# Patient Record
Sex: MALE | Race: WHITE | Employment: OTHER | URBAN - METROPOLITAN AREA
[De-identification: names, ages, dates, MRNs, and addresses within clinical notes are randomized per-mention and may not be internally consistent; named-entity substitution may affect disease eponyms.]

---

## 2018-07-09 ENCOUNTER — APPOINTMENT (OUTPATIENT)
Dept: GENERAL RADIOLOGY | Age: 83
End: 2018-07-09
Attending: EMERGENCY MEDICINE
Payer: MEDICARE

## 2018-07-09 ENCOUNTER — HOSPITAL ENCOUNTER (EMERGENCY)
Age: 83
Discharge: HOME | End: 2018-07-09
Attending: EMERGENCY MEDICINE
Payer: MEDICARE

## 2018-07-09 VITALS
OXYGEN SATURATION: 94 % | BODY MASS INDEX: 29.02 KG/M2 | WEIGHT: 170 LBS | HEIGHT: 64 IN | TEMPERATURE: 100.2 F | HEART RATE: 97 BPM | SYSTOLIC BLOOD PRESSURE: 137 MMHG | RESPIRATION RATE: 20 BRPM | DIASTOLIC BLOOD PRESSURE: 66 MMHG

## 2018-07-09 DIAGNOSIS — R50.9 ACUTE FEBRILE ILLNESS: Primary | ICD-10-CM

## 2018-07-09 DIAGNOSIS — R31.9 HEMATURIA, UNSPECIFIED TYPE: ICD-10-CM

## 2018-07-09 LAB
ALBUMIN SERPL-MCNC: 3.4 G/DL (ref 3.2–4.6)
ALBUMIN/GLOB SERPL: 0.9 {RATIO} (ref 1.2–3.5)
ALP SERPL-CCNC: 90 U/L (ref 50–136)
ALT SERPL-CCNC: 20 U/L (ref 12–65)
ANION GAP SERPL CALC-SCNC: 7 MMOL/L (ref 7–16)
AST SERPL-CCNC: 21 U/L (ref 15–37)
BACTERIA URNS QL MICRO: 0 /HPF
BASOPHILS # BLD: 0 K/UL (ref 0–0.2)
BASOPHILS NFR BLD: 0 % (ref 0–2)
BILIRUB SERPL-MCNC: 0.8 MG/DL (ref 0.2–1.1)
BUN SERPL-MCNC: 21 MG/DL (ref 8–23)
CALCIUM SERPL-MCNC: 9 MG/DL (ref 8.3–10.4)
CASTS URNS QL MICRO: NORMAL /LPF
CHLORIDE SERPL-SCNC: 101 MMOL/L (ref 98–107)
CO2 SERPL-SCNC: 30 MMOL/L (ref 21–32)
CREAT SERPL-MCNC: 1.16 MG/DL (ref 0.8–1.5)
DIFFERENTIAL METHOD BLD: ABNORMAL
EOSINOPHIL # BLD: 0.1 K/UL (ref 0–0.8)
EOSINOPHIL NFR BLD: 1 % (ref 0.5–7.8)
EPI CELLS #/AREA URNS HPF: NORMAL /HPF
ERYTHROCYTE [DISTWIDTH] IN BLOOD BY AUTOMATED COUNT: 13.7 % (ref 11.9–14.6)
GLOBULIN SER CALC-MCNC: 4 G/DL (ref 2.3–3.5)
GLUCOSE SERPL-MCNC: 103 MG/DL (ref 65–100)
HCT VFR BLD AUTO: 41.5 % (ref 41.1–50.3)
HGB BLD-MCNC: 13.5 G/DL (ref 13.6–17.2)
IMM GRANULOCYTES # BLD: 0 K/UL (ref 0–0.5)
IMM GRANULOCYTES NFR BLD AUTO: 0 % (ref 0–5)
LACTATE BLD-SCNC: 1 MMOL/L (ref 0.5–1.9)
LYMPHOCYTES # BLD: 1.8 K/UL (ref 0.5–4.6)
LYMPHOCYTES NFR BLD: 12 % (ref 13–44)
MCH RBC QN AUTO: 32.6 PG (ref 26.1–32.9)
MCHC RBC AUTO-ENTMCNC: 32.5 G/DL (ref 31.4–35)
MCV RBC AUTO: 100.2 FL (ref 79.6–97.8)
MONOCYTES # BLD: 1.1 K/UL (ref 0.1–1.3)
MONOCYTES NFR BLD: 7 % (ref 4–12)
NEUTS SEG # BLD: 12 K/UL (ref 1.7–8.2)
NEUTS SEG NFR BLD: 80 % (ref 43–78)
PLATELET # BLD AUTO: 165 K/UL (ref 150–450)
PMV BLD AUTO: 10.3 FL (ref 10.8–14.1)
POTASSIUM SERPL-SCNC: 3.7 MMOL/L (ref 3.5–5.1)
PROT SERPL-MCNC: 7.4 G/DL (ref 6.3–8.2)
RBC # BLD AUTO: 4.14 M/UL (ref 4.23–5.67)
RBC #/AREA URNS HPF: NORMAL /HPF
SODIUM SERPL-SCNC: 138 MMOL/L (ref 136–145)
WBC # BLD AUTO: 15.1 K/UL (ref 4.3–11.1)
WBC URNS QL MICRO: NORMAL /HPF

## 2018-07-09 PROCEDURE — 87040 BLOOD CULTURE FOR BACTERIA: CPT | Performed by: EMERGENCY MEDICINE

## 2018-07-09 PROCEDURE — 81003 URINALYSIS AUTO W/O SCOPE: CPT | Performed by: EMERGENCY MEDICINE

## 2018-07-09 PROCEDURE — 71046 X-RAY EXAM CHEST 2 VIEWS: CPT

## 2018-07-09 PROCEDURE — 85025 COMPLETE CBC W/AUTO DIFF WBC: CPT | Performed by: EMERGENCY MEDICINE

## 2018-07-09 PROCEDURE — 74011250637 HC RX REV CODE- 250/637: Performed by: EMERGENCY MEDICINE

## 2018-07-09 PROCEDURE — 74011250636 HC RX REV CODE- 250/636: Performed by: EMERGENCY MEDICINE

## 2018-07-09 PROCEDURE — 87086 URINE CULTURE/COLONY COUNT: CPT | Performed by: EMERGENCY MEDICINE

## 2018-07-09 PROCEDURE — 83605 ASSAY OF LACTIC ACID: CPT

## 2018-07-09 PROCEDURE — 99284 EMERGENCY DEPT VISIT MOD MDM: CPT | Performed by: EMERGENCY MEDICINE

## 2018-07-09 PROCEDURE — 80053 COMPREHEN METABOLIC PANEL: CPT | Performed by: EMERGENCY MEDICINE

## 2018-07-09 PROCEDURE — 81015 MICROSCOPIC EXAM OF URINE: CPT | Performed by: EMERGENCY MEDICINE

## 2018-07-09 RX ORDER — NAPROXEN 375 MG/1
375 TABLET ORAL 2 TIMES DAILY WITH MEALS
COMMUNITY

## 2018-07-09 RX ORDER — MECLIZINE HYDROCHLORIDE 25 MG/1
25 TABLET ORAL
COMMUNITY

## 2018-07-09 RX ORDER — ATORVASTATIN CALCIUM 20 MG/1
10 TABLET, FILM COATED ORAL DAILY
COMMUNITY

## 2018-07-09 RX ORDER — ALLOPURINOL 300 MG/1
100 TABLET ORAL DAILY
COMMUNITY

## 2018-07-09 RX ORDER — LEVOFLOXACIN 750 MG/1
750 TABLET ORAL DAILY
Qty: 7 TAB | Refills: 0 | Status: SHIPPED | OUTPATIENT
Start: 2018-07-09 | End: 2021-02-06

## 2018-07-09 RX ORDER — GUAIFENESIN 100 MG/5ML
81 LIQUID (ML) ORAL DAILY
COMMUNITY

## 2018-07-09 RX ORDER — DIAPER,BRIEF,INFANT-TODD,DISP
EACH MISCELLANEOUS
COMMUNITY
End: 2021-07-22

## 2018-07-09 RX ORDER — ACETAMINOPHEN 500 MG
1000 TABLET ORAL
Status: COMPLETED | OUTPATIENT
Start: 2018-07-09 | End: 2018-07-09

## 2018-07-09 RX ORDER — OMEPRAZOLE 20 MG/1
20 CAPSULE, DELAYED RELEASE ORAL DAILY
COMMUNITY

## 2018-07-09 RX ORDER — LANOLIN ALCOHOL/MO/W.PET/CERES
1000 CREAM (GRAM) TOPICAL DAILY
COMMUNITY
End: 2021-07-22

## 2018-07-09 RX ADMIN — SODIUM CHLORIDE 500 ML: 900 INJECTION, SOLUTION INTRAVENOUS at 18:40

## 2018-07-09 RX ADMIN — ACETAMINOPHEN 1000 MG: 500 TABLET, FILM COATED ORAL at 19:27

## 2018-07-09 NOTE — ED PROVIDER NOTES
Patient is a 81 y.o. male presenting with fever. The history is provided by the patient and the spouse. Fever This is a new problem. The current episode started 12 to 24 hours ago. The problem occurs constantly. The problem has been gradually worsening. The maximum temperature noted was 102 - 102.9 F. The temperature was taken using an oral thermometer. Associated symptoms include sleepiness, muscle aches and urinary symptoms. Pertinent negatives include no chest pain, no diarrhea, no vomiting, no headaches, no sore throat, no cough, no shortness of breath, no neck pain and no rash. He has tried nothing for the symptoms. Past Medical History:  
Diagnosis Date  Ill-defined condition Vertigo Past Surgical History:  
Procedure Laterality Date  HX APPENDECTOMY [address] Colon resection  NEUROLOGICAL PROCEDURE UNLISTED Cervical fusion History reviewed. No pertinent family history. Social History Social History  Marital status:  Spouse name: N/A  
 Number of children: N/A  
 Years of education: N/A Occupational History  Not on file. Social History Main Topics  Smoking status: Never Smoker  Smokeless tobacco: Never Used  Alcohol use No  
 Drug use: No  
 Sexual activity: Not on file Other Topics Concern  Not on file Social History Narrative  No narrative on file ALLERGIES: Review of patient's allergies indicates no known allergies. Review of Systems Constitutional: Positive for fever. Negative for activity change, chills and diaphoresis. HENT: Negative for dental problem, hearing loss, nosebleeds, rhinorrhea and sore throat. Eyes: Negative for pain, discharge, redness and visual disturbance. Respiratory: Negative for cough, chest tightness and shortness of breath. Cardiovascular: Negative for chest pain, palpitations and leg swelling.   
Gastrointestinal: Negative for abdominal pain, constipation, diarrhea, nausea and vomiting. Endocrine: Negative for cold intolerance, heat intolerance, polydipsia and polyuria. Genitourinary: Positive for hematuria. Negative for decreased urine volume, dysuria, flank pain, penile pain, penile swelling, scrotal swelling and testicular pain. Musculoskeletal: Negative for arthralgias, back pain, joint swelling, myalgias and neck pain. Skin: Negative for pallor and rash. Allergic/Immunologic: Negative for environmental allergies and food allergies. Neurological: Negative for dizziness, tremors, light-headedness, numbness and headaches. Hematological: Negative for adenopathy. Does not bruise/bleed easily. Psychiatric/Behavioral: Negative for confusion and dysphoric mood. The patient is not nervous/anxious and is not hyperactive. All other systems reviewed and are negative. Vitals:  
 07/09/18 1814 BP: (!) 160/91 Pulse: 97 Resp: 20 Temp: 100.2 °F (37.9 °C) SpO2: 96% Weight: 77.1 kg (170 lb) Height: 5' 4\" (1.626 m) Physical Exam  
Constitutional: He is oriented to person, place, and time. He appears well-developed and well-nourished. He appears distressed. HENT:  
Head: Normocephalic and atraumatic. Mouth/Throat: Oropharynx is clear and moist. No oropharyngeal exudate. Eyes: Conjunctivae and EOM are normal. Pupils are equal, round, and reactive to light. Right eye exhibits no discharge. Left eye exhibits no discharge. No scleral icterus. Neck: Normal range of motion. Neck supple. No JVD present. Cardiovascular: Normal rate, regular rhythm, normal heart sounds and intact distal pulses. Exam reveals no gallop and no friction rub. No murmur heard. Pulmonary/Chest: Effort normal and breath sounds normal. No respiratory distress. He has no wheezes. He exhibits no tenderness. Abdominal: Soft. Normal appearance and bowel sounds are normal. He exhibits no distension. There is no hepatosplenomegaly.  There is tenderness in the left lower quadrant. There is no rebound and no guarding. No hernia. Musculoskeletal: Normal range of motion. He exhibits no edema or tenderness. Lymphadenopathy:  
  He has no cervical adenopathy. Neurological: He is alert and oriented to person, place, and time. He has normal strength. No cranial nerve deficit or sensory deficit. He exhibits normal muscle tone. GCS eye subscore is 4. GCS verbal subscore is 5. GCS motor subscore is 6. Skin: Skin is warm and dry. No rash noted. He is not diaphoretic. No erythema. Psychiatric: He has a normal mood and affect. His speech is normal and behavior is normal. Judgment and thought content normal. Cognition and memory are normal.  
Nursing note and vitals reviewed. MDM Number of Diagnoses or Management Options Acute febrile illness: new and requires workup Hematuria, unspecified type: new and requires workup Diagnosis management comments: 72-year-old male with fever. Hematuria. Urine reveals 20-50 white cells with 10-20 red cells and no bacteria White blood cell count 15,000 Lactic acid 1 Chest x-ray unremarkable Patient will be discharged home, encouraged follow-up with the [facility] Amount and/or Complexity of Data Reviewed Clinical lab tests: ordered and reviewed Tests in the radiology section of CPT®: ordered and reviewed Tests in the medicine section of CPT®: reviewed and ordered Obtain history from someone other than the patient: yes Review and summarize past medical records: yes Risk of Complications, Morbidity, and/or Mortality Presenting problems: moderate Diagnostic procedures: moderate Management options: moderate General comments: Elements of this note have been dictated via voice recognition software. Text and phrases may be limited by the accuracy of the software. The chart has been reviewed, but errors may still be present. Patient Progress Patient progress: improved ED Course Procedures

## 2018-07-10 NOTE — ED NOTES
I have reviewed discharge instructions with the patient. The patient verbalized understanding. Patient left ED via Discharge Method: wheelchair to Home with ( family, self). Opportunity for questions and clarification provided. Patient given 1 scripts. To continue your aftercare when you leave the hospital, you may receive an automated call from our care team to check in on how you are doing. This is a free service and part of our promise to provide the best care and service to meet your aftercare needs.  If you have questions, or wish to unsubscribe from this service please call [phone]. Thank you for Choosing our [facility].

## 2018-07-10 NOTE — DISCHARGE INSTRUCTIONS
Learning About Fever  What is a fever? A fever is a high body temperature. It's one way your body fights being sick. A fever shows that the body is responding to infection or other illnesses, both minor and severe. A fever is a symptom, not an illness by itself. A fever can be a sign that you are ill, but most fevers are not caused by a serious problem. You may have a fever with a minor illness, such as a cold. But sometimes a very serious infection may cause little or no fever. It is important to look at other symptoms, other conditions you have, and how you feel in general. In children, notice how they act and see what symptoms they complain of. What is a normal body temperature? A normal body temperature is about 98. 6ºF. Some people have a normal temperature that is a little higher or a little lower than this. Your temperature may be a little lower in the morning than it is later in the day. It may go up during hot weather or when you exercise, wear heavy clothes, or take a hot bath. Your temperature may also be different depending on how you take it. A temperature taken in the mouth (oral) or under the arm may be a little lower than your core temperature (rectal). What is a fever temperature? A core temperature of 100.4°F or above is considered a fever. What can cause a fever? A fever may be caused by:  · Infections. This is the most common cause of a fever. Examples of infections that can cause a fever include the flu, a kidney infection, or pneumonia. · Some medicines. · Severe trauma or injury, such as a heart attack, stroke, heatstroke, or burns. · Other medical conditions, such as arthritis and some cancers. How can you treat a fever at home? · Ask your doctor if you can take an over-the-counter pain medicine, such as acetaminophen (Tylenol), ibuprofen (Advil, Motrin), or naproxen (Aleve). Be safe with medicines. Read and follow all instructions on the label.   · To prevent dehydration, drink plenty of fluids. Choose water and other caffeine-free clear liquids until you feel better. If you have kidney, heart, or liver disease and have to limit fluids, talk with your doctor before you increase the amount of fluids you drink. Follow-up care is a key part of your treatment and safety. Be sure to make and go to all appointments, and call your doctor if you are having problems. It's also a good idea to know your test results and keep a list of the medicines you take. Where can you learn more? Go to http://alexa-francia.info/. Enter O158 in the search box to learn more about \"Learning About Fever. \"  Current as of: March 20, 2017  Content Version: 11.4  © 3189-9576 [employer]. Care instructions adapted under license by [employer] (which disclaims liability or warranty for this information). If you have questions about a medical condition or this instruction, always ask your healthcare professional. [place] [address] any warranty or liability for your use of this information. Blood in the Urine: Care Instructions  Your Care Instructions    Blood in the urine, or hematuria, may make the urine look red, brown, or pink. There may be blood every time you urinate or just from time to time. You cannot always see blood in the urine, but it will show up in a urine test.  Blood in the urine may be serious. It should always be checked by a doctor. Your doctor may recommend more tests, including an X-ray, a CT scan, or a cystoscopy (which lets a doctor look inside the urethra and bladder). Blood in the urine can be a sign of another problem. Common causes are bladder infections and kidney stones. An injury to your groin or your genital area can also cause bleeding in the urinary tract. Very hard exercise-such as running a marathon-can cause blood in the urine.  Blood in the urine can also be a sign of kidney disease or cancer in the bladder or kidney. Many cases of blood in the urine are caused by a harmless condition that runs in families. This is called benign familial hematuria. It does not need any treatment. Sometimes your urine may look red or brown even though it does not contain blood. For example, not getting enough fluids (dehydration), taking certain medicines, or having a liver problem can change the color of your urine. Eating foods such as beets, rhubarb, or blackberries or foods with red food coloring can make your urine look red or pink. Follow-up care is a key part of your treatment and safety. Be sure to make and go to all appointments, and call your doctor if you are having problems. It's also a good idea to know your test results and keep a list of the medicines you take. When should you call for help? Call your doctor now or seek immediate medical care if:  · You have symptoms of a urinary infection. For example:  ¨ You have pus in your urine. ¨ You have pain in your back just below your rib cage. This is called flank pain. ¨ You have a fever, chills, or body aches. ¨ It hurts to urinate. ¨ You have groin or belly pain. · You have more blood in your urine. Watch closely for changes in your health, and be sure to contact your doctor if:  · You have new urination problems. · You do not get better as expected. Where can you learn more? Go to http://alexa-francia.info/. Enter R915 in the search box to learn more about \"Blood in the Urine: Care Instructions. \"  Current as of: May 12, 2017  Content Version: 11.4  © 3633-9295 [employer]. Care instructions adapted under license by [employer] (which disclaims liability or warranty for this information). If you have questions about a medical condition or this instruction, always ask your healthcare professional. [address] any warranty or liability for your use of this information.

## 2018-07-12 LAB
BACTERIA SPEC CULT: NORMAL
SERVICE CMNT-IMP: NORMAL

## 2018-07-14 LAB
BACTERIA SPEC CULT: NORMAL
BACTERIA SPEC CULT: NORMAL
SERVICE CMNT-IMP: NORMAL
SERVICE CMNT-IMP: NORMAL

## 2019-07-11 PROBLEM — M54.2 NECK PAIN: Status: ACTIVE | Noted: 2019-07-11

## 2019-07-11 PROBLEM — M50.30 DDD (DEGENERATIVE DISC DISEASE), CERVICAL: Status: ACTIVE | Noted: 2019-07-11

## 2021-02-06 ENCOUNTER — APPOINTMENT (OUTPATIENT)
Dept: GENERAL RADIOLOGY | Age: 86
End: 2021-02-06
Attending: EMERGENCY MEDICINE
Payer: OTHER GOVERNMENT

## 2021-02-06 ENCOUNTER — HOSPITAL ENCOUNTER (EMERGENCY)
Age: 86
Discharge: HOME | End: 2021-02-06
Attending: EMERGENCY MEDICINE
Payer: OTHER GOVERNMENT

## 2021-02-06 VITALS
HEART RATE: 92 BPM | WEIGHT: 190 LBS | SYSTOLIC BLOOD PRESSURE: 148 MMHG | DIASTOLIC BLOOD PRESSURE: 83 MMHG | RESPIRATION RATE: 28 BRPM | TEMPERATURE: 98 F | HEIGHT: 67 IN | BODY MASS INDEX: 29.82 KG/M2 | OXYGEN SATURATION: 95 %

## 2021-02-06 DIAGNOSIS — R91.8 INFILTRATE OF LUNG PRESENT ON CHEST X-RAY: ICD-10-CM

## 2021-02-06 DIAGNOSIS — I10 HYPERTENSION, UNSPECIFIED TYPE: Primary | ICD-10-CM

## 2021-02-06 DIAGNOSIS — R42 VERTIGO: ICD-10-CM

## 2021-02-06 LAB
ALBUMIN SERPL-MCNC: 3.8 G/DL (ref 3.2–4.6)
ALBUMIN/GLOB SERPL: 1.1 {RATIO} (ref 1.2–3.5)
ALP SERPL-CCNC: 74 U/L (ref 50–136)
ALT SERPL-CCNC: 28 U/L (ref 12–65)
ANION GAP SERPL CALC-SCNC: 5 MMOL/L (ref 7–16)
AST SERPL-CCNC: 25 U/L (ref 15–37)
BASOPHILS # BLD: 0.1 K/UL (ref 0–0.2)
BASOPHILS NFR BLD: 2 % (ref 0–2)
BILIRUB SERPL-MCNC: 0.5 MG/DL (ref 0.2–1.1)
BNP SERPL-MCNC: 277 PG/ML
BUN SERPL-MCNC: 17 MG/DL (ref 8–23)
CALCIUM SERPL-MCNC: 9.1 MG/DL (ref 8.3–10.4)
CHLORIDE SERPL-SCNC: 107 MMOL/L (ref 98–107)
CO2 SERPL-SCNC: 28 MMOL/L (ref 21–32)
CREAT SERPL-MCNC: 0.93 MG/DL (ref 0.8–1.5)
DIFFERENTIAL METHOD BLD: ABNORMAL
EOSINOPHIL # BLD: 0.5 K/UL (ref 0–0.8)
EOSINOPHIL NFR BLD: 8 % (ref 0.5–7.8)
ERYTHROCYTE [DISTWIDTH] IN BLOOD BY AUTOMATED COUNT: 13.4 % (ref 11.9–14.6)
GLOBULIN SER CALC-MCNC: 3.6 G/DL (ref 2.3–3.5)
GLUCOSE SERPL-MCNC: 87 MG/DL (ref 65–100)
HCT VFR BLD AUTO: 41 % (ref 41.1–50.3)
HGB BLD-MCNC: 13.6 G/DL (ref 13.6–17.2)
IMM GRANULOCYTES # BLD AUTO: 0 K/UL (ref 0–0.5)
IMM GRANULOCYTES NFR BLD AUTO: 0 % (ref 0–5)
LYMPHOCYTES # BLD: 1.8 K/UL (ref 0.5–4.6)
LYMPHOCYTES NFR BLD: 28 % (ref 13–44)
MCH RBC QN AUTO: 33.4 PG (ref 26.1–32.9)
MCHC RBC AUTO-ENTMCNC: 33.2 G/DL (ref 31.4–35)
MCV RBC AUTO: 100.7 FL (ref 79.6–97.8)
MONOCYTES # BLD: 0.5 K/UL (ref 0.1–1.3)
MONOCYTES NFR BLD: 7 % (ref 4–12)
NEUTS SEG # BLD: 3.7 K/UL (ref 1.7–8.2)
NEUTS SEG NFR BLD: 56 % (ref 43–78)
NRBC # BLD: 0 K/UL (ref 0–0.2)
PLATELET # BLD AUTO: 180 K/UL (ref 150–450)
PMV BLD AUTO: 9.9 FL (ref 9.4–12.3)
POTASSIUM SERPL-SCNC: 3.9 MMOL/L (ref 3.5–5.1)
PROT SERPL-MCNC: 7.4 G/DL (ref 6.3–8.2)
RBC # BLD AUTO: 4.07 M/UL (ref 4.23–5.6)
SODIUM SERPL-SCNC: 140 MMOL/L (ref 136–145)
TROPONIN-HIGH SENSITIVITY: 10.7 PG/ML (ref 0–14)
WBC # BLD AUTO: 6.6 K/UL (ref 4.3–11.1)

## 2021-02-06 PROCEDURE — 85025 COMPLETE CBC W/AUTO DIFF WBC: CPT

## 2021-02-06 PROCEDURE — 99285 EMERGENCY DEPT VISIT HI MDM: CPT

## 2021-02-06 PROCEDURE — 80053 COMPREHEN METABOLIC PANEL: CPT

## 2021-02-06 PROCEDURE — 74011250637 HC RX REV CODE- 250/637: Performed by: EMERGENCY MEDICINE

## 2021-02-06 PROCEDURE — 93005 ELECTROCARDIOGRAM TRACING: CPT | Performed by: EMERGENCY MEDICINE

## 2021-02-06 PROCEDURE — 83880 ASSAY OF NATRIURETIC PEPTIDE: CPT

## 2021-02-06 PROCEDURE — 71045 X-RAY EXAM CHEST 1 VIEW: CPT

## 2021-02-06 PROCEDURE — 84484 ASSAY OF TROPONIN QUANT: CPT

## 2021-02-06 RX ORDER — AZITHROMYCIN 250 MG/1
TABLET, FILM COATED ORAL
Qty: 6 TAB | Refills: 0 | Status: SHIPPED | OUTPATIENT
Start: 2021-02-06 | End: 2021-07-22

## 2021-02-06 RX ORDER — HYDRALAZINE HYDROCHLORIDE 25 MG/1
50 TABLET, FILM COATED ORAL
Status: COMPLETED | OUTPATIENT
Start: 2021-02-06 | End: 2021-02-06

## 2021-02-06 RX ORDER — AMLODIPINE BESYLATE 10 MG/1
10 TABLET ORAL DAILY
Qty: 30 TAB | Refills: 0 | OUTPATIENT
Start: 2021-02-06 | End: 2021-02-16

## 2021-02-06 RX ADMIN — HYDRALAZINE HYDROCHLORIDE 50 MG: 25 TABLET, FILM COATED ORAL at 18:39

## 2021-02-06 NOTE — ED PROVIDER NOTES
Patient with a history of vertigo longstanding and daily. Also has hypertension on losartan 25 mg. Is notices blood pressure has been elevated recently and increased to 50 mg yesterday by the [place]. Blood pressure has continued to be elevated with increased vertigo type symptoms over the past 3 to 4 days. Came here for evaluation. No headache or blurry vision. No chest pain or shortness of breath. Mild nausea with the vertigo. The history is provided by the patient. No  was used. Hypertension   This is a new problem. The current episode started more than 2 days ago. The problem has not changed since onset. Associated symptoms include dizziness and nausea. Pertinent negatives include no chest pain, no orthopnea, no confusion, no malaise/fatigue, no blurred vision, no headaches, no neck pain, no peripheral edema, no shortness of breath and no vomiting. Risk factors include hypertension and male gender.         Past Medical History:   Diagnosis Date    Hypercholesterolemia     Hypertension     Vertigo     Vertigo       Past Surgical History:   Procedure Laterality Date    HX APPENDECTOMY      HX GI  1964    Colon resection    NEUROLOGICAL PROCEDURE UNLISTED  1988    neck         Family History:   Problem Relation Age of Onset    Lupus Mother     Arthritis-osteo Mother     Hypertension Mother     Hypertension Father     Stroke Father        Social History     Socioeconomic History    Marital status:      Spouse name: Not on file    Number of children: Not on file    Years of education: Not on file    Highest education level: Not on file   Occupational History    Not on file   Social Needs    Financial resource strain: Not on file    Food insecurity     Worry: Not on file     Inability: Not on file    Transportation needs     Medical: Not on file     Non-medical: Not on file   Tobacco Use    Smoking status: Never Smoker    Smokeless tobacco: Never Used   Substance and Sexual Activity    Alcohol use: No    Drug use: No    Sexual activity: Not on file   Lifestyle    Physical activity     Days per week: Not on file     Minutes per session: Not on file    Stress: Not on file   Relationships    Social connections     Talks on phone: Not on file     Gets together: Not on file     Attends Hoahaoism service: Not on file     Active member of club or organization: Not on file     Attends meetings of clubs or organizations: Not on file     Relationship status: Not on file    Intimate partner violence     Fear of current or ex partner: Not on file     Emotionally abused: Not on file     Physically abused: Not on file     Forced sexual activity: Not on file   Other Topics Concern    Not on file   Social History Narrative    Not on file         ALLERGIES: Bupropion, Clonidine, Doxazosin, Hydrochlorothiazide, Lipitor [atorvastatin], Lisinopril, Metoprolol, and Simvastatin    Review of Systems   Constitutional: Negative for chills, fever and malaise/fatigue. HENT: Negative for rhinorrhea and sore throat. Eyes: Negative for blurred vision, pain, redness and visual disturbance. Respiratory: Negative for chest tightness, shortness of breath and wheezing. Cardiovascular: Negative for chest pain, orthopnea and leg swelling. Gastrointestinal: Positive for nausea. Negative for abdominal pain, diarrhea and vomiting. Genitourinary: Negative for dysuria and hematuria. Musculoskeletal: Negative for back pain, gait problem, neck pain and neck stiffness. Skin: Negative for color change and rash. Neurological: Positive for dizziness. Negative for weakness, numbness and headaches. Psychiatric/Behavioral: Negative for confusion.        Vitals:    02/06/21 1722 02/06/21 1800   BP: (!) 189/104 (!) 176/87   Pulse: 87 79   Resp: 17 16   Temp: 98 °F (36.7 °C)    SpO2: 96% 95%   Weight: 86.2 kg (190 lb)    Height: 5' 7\" (1.702 m)             Physical Exam  Constitutional: Appearance: Normal appearance. He is well-developed. HENT:      Head: Normocephalic and atraumatic. Eyes:      Extraocular Movements: Extraocular movements intact. Pupils: Pupils are equal, round, and reactive to light. Neck:      Musculoskeletal: Normal range of motion and neck supple. Cardiovascular:      Rate and Rhythm: Normal rate and regular rhythm. Pulmonary:      Effort: Pulmonary effort is normal.      Breath sounds: Normal breath sounds. Abdominal:      General: Bowel sounds are normal.      Palpations: Abdomen is soft. Tenderness: There is no abdominal tenderness. Musculoskeletal: Normal range of motion. General: Swelling present. Skin:     General: Skin is warm and dry. Neurological:      General: No focal deficit present. Mental Status: He is alert and oriented to person, place, and time. MDM  Number of Diagnoses or Management Options  Diagnosis management comments: Pt feels better here. Blood pressure improved. Will discharge with PCP follow up. Small infiltrate on CXR will start abx. Amount and/or Complexity of Data Reviewed  Clinical lab tests: ordered and reviewed  Tests in the radiology section of CPT®: ordered and reviewed  Tests in the medicine section of CPT®: ordered and reviewed    Patient Progress  Patient progress: stable         Procedures      EKG: normal sinus rhythm, nonspecific ST and T waves changes. Rate 75. XR CHEST PORT (Final result)  Result time 02/06/21 18:59:38  Final result by Kip Alfonso MD (02/06/21 18:59:38)                Impression:    1. Bandlike opacity left mid lung favored atelectasis, although difficult to   exclude pneumonia. Narrative:    EXAM: Single view chest radiograph. INDICATION: Cough. COMPARISON: Chest radiograph dated July 09, 2018. FINDINGS:   Bandlike opacity left midlung favored atelectasis. No pneumothorax or pleural   effusion. Heart is normal in size.  No acute osseous abnormality.                   Results Include:    Recent Results (from the past 24 hour(s))   CBC WITH AUTOMATED DIFF    Collection Time: 02/06/21  6:02 PM   Result Value Ref Range    WBC 6.6 4.3 - 11.1 K/uL    RBC 4.07 (L) 4.23 - 5.6 M/uL    HGB 13.6 13.6 - 17.2 g/dL    HCT 41.0 (L) 41.1 - 50.3 %    .7 (H) 79.6 - 97.8 FL    MCH 33.4 (H) 26.1 - 32.9 PG    MCHC 33.2 31.4 - 35.0 g/dL    RDW 13.4 11.9 - 14.6 %    PLATELET 978 367 - 280 K/uL    MPV 9.9 9.4 - 12.3 FL    ABSOLUTE NRBC 0.00 0.0 - 0.2 K/uL    DF AUTOMATED      NEUTROPHILS 56 43 - 78 %    LYMPHOCYTES 28 13 - 44 %    MONOCYTES 7 4.0 - 12.0 %    EOSINOPHILS 8 (H) 0.5 - 7.8 %    BASOPHILS 2 0.0 - 2.0 %    IMMATURE GRANULOCYTES 0 0.0 - 5.0 %    ABS. NEUTROPHILS 3.7 1.7 - 8.2 K/UL    ABS. LYMPHOCYTES 1.8 0.5 - 4.6 K/UL    ABS. MONOCYTES 0.5 0.1 - 1.3 K/UL    ABS. EOSINOPHILS 0.5 0.0 - 0.8 K/UL    ABS. BASOPHILS 0.1 0.0 - 0.2 K/UL    ABS. IMM. GRANS. 0.0 0.0 - 0.5 K/UL   METABOLIC PANEL, COMPREHENSIVE    Collection Time: 02/06/21  6:02 PM   Result Value Ref Range    Sodium 140 136 - 145 mmol/L    Potassium 3.9 3.5 - 5.1 mmol/L    Chloride 107 98 - 107 mmol/L    CO2 28 21 - 32 mmol/L    Anion gap 5 (L) 7 - 16 mmol/L    Glucose 87 65 - 100 mg/dL    BUN 17 8 - 23 MG/DL    Creatinine 0.93 0.8 - 1.5 MG/DL    GFR est AA >60 >60 ml/min/1.73m2    GFR est non-AA >60 >60 ml/min/1.73m2    Calcium 9.1 8.3 - 10.4 MG/DL    Bilirubin, total 0.5 0.2 - 1.1 MG/DL    ALT (SGPT) 28 12 - 65 U/L    AST (SGOT) 25 15 - 37 U/L    Alk.  phosphatase 74 50 - 136 U/L    Protein, total 7.4 6.3 - 8.2 g/dL    Albumin 3.8 3.2 - 4.6 g/dL    Globulin 3.6 (H) 2.3 - 3.5 g/dL    A-G Ratio 1.1 (L) 1.2 - 3.5     NT-PRO BNP    Collection Time: 02/06/21  6:02 PM   Result Value Ref Range    NT pro- <450 PG/ML   TROPONIN-HIGH SENSITIVITY    Collection Time: 02/06/21  6:02 PM   Result Value Ref Range    Troponin-High Sensitivity 10.7 0 - 14 pg/mL

## 2021-02-06 NOTE — ED TRIAGE NOTES
Pt c/o high blood pressure. Pt states he had been taking 25mg of losartan but increased to 50mg yesterday. Pt states his /117 was the last BP he took. Pt denies blurry and double vision.  Pt masked upon arrival to the ED

## 2021-02-07 LAB
ATRIAL RATE: 75 BPM
CALCULATED P AXIS, ECG09: 49 DEGREES
CALCULATED R AXIS, ECG10: 21 DEGREES
CALCULATED T AXIS, ECG11: 28 DEGREES
DIAGNOSIS, 93000: NORMAL
P-R INTERVAL, ECG05: 164 MS
Q-T INTERVAL, ECG07: 364 MS
QRS DURATION, ECG06: 82 MS
QTC CALCULATION (BEZET), ECG08: 406 MS
VENTRICULAR RATE, ECG03: 75 BPM

## 2021-02-07 NOTE — ED NOTES
I have reviewed discharge instructions with the patient. The patient verbalized understanding. Patient left ED via Discharge Method: ambulatory to Home with wife. Opportunity for questions and clarification provided. Patient given 2 scripts. To continue your aftercare when you leave the hospital, you may receive an automated call from our care team to check in on how you are doing. This is a free service and part of our promise to provide the best care and service to meet your aftercare needs.  If you have questions, or wish to unsubscribe from this service please call [phone]. Thank you for Choosing our [facility].

## 2021-02-16 ENCOUNTER — HOSPITAL ENCOUNTER (EMERGENCY)
Age: 86
Discharge: HOME | End: 2021-02-16
Attending: EMERGENCY MEDICINE
Payer: OTHER GOVERNMENT

## 2021-02-16 ENCOUNTER — APPOINTMENT (OUTPATIENT)
Dept: CT IMAGING | Age: 86
End: 2021-02-16
Attending: EMERGENCY MEDICINE
Payer: OTHER GOVERNMENT

## 2021-02-16 VITALS
BODY MASS INDEX: 29.76 KG/M2 | WEIGHT: 190 LBS | TEMPERATURE: 98.1 F | SYSTOLIC BLOOD PRESSURE: 137 MMHG | RESPIRATION RATE: 18 BRPM | OXYGEN SATURATION: 97 % | HEART RATE: 93 BPM | DIASTOLIC BLOOD PRESSURE: 78 MMHG

## 2021-02-16 DIAGNOSIS — R42 VERTIGO: Primary | ICD-10-CM

## 2021-02-16 DIAGNOSIS — G89.29 CHRONIC NECK PAIN: ICD-10-CM

## 2021-02-16 DIAGNOSIS — R60.9 DEPENDENT EDEMA: ICD-10-CM

## 2021-02-16 DIAGNOSIS — E86.0 DEHYDRATION: ICD-10-CM

## 2021-02-16 DIAGNOSIS — M54.2 CHRONIC NECK PAIN: ICD-10-CM

## 2021-02-16 LAB
ALBUMIN SERPL-MCNC: 3.7 G/DL (ref 3.2–4.6)
ALBUMIN/GLOB SERPL: 1.1 {RATIO} (ref 1.2–3.5)
ALP SERPL-CCNC: 65 U/L (ref 50–136)
ALT SERPL-CCNC: 30 U/L (ref 12–65)
ANION GAP SERPL CALC-SCNC: 7 MMOL/L (ref 7–16)
AST SERPL-CCNC: 24 U/L (ref 15–37)
BASOPHILS # BLD: 0.1 K/UL (ref 0–0.2)
BASOPHILS NFR BLD: 1 % (ref 0–2)
BILIRUB SERPL-MCNC: 0.4 MG/DL (ref 0.2–1.1)
BUN SERPL-MCNC: 35 MG/DL (ref 8–23)
CALCIUM SERPL-MCNC: 8.9 MG/DL (ref 8.3–10.4)
CHLORIDE SERPL-SCNC: 105 MMOL/L (ref 98–107)
CO2 SERPL-SCNC: 26 MMOL/L (ref 21–32)
CREAT SERPL-MCNC: 1.05 MG/DL (ref 0.8–1.5)
DIFFERENTIAL METHOD BLD: ABNORMAL
EOSINOPHIL # BLD: 0.2 K/UL (ref 0–0.8)
EOSINOPHIL NFR BLD: 2 % (ref 0.5–7.8)
ERYTHROCYTE [DISTWIDTH] IN BLOOD BY AUTOMATED COUNT: 13.2 % (ref 11.9–14.6)
GLOBULIN SER CALC-MCNC: 3.4 G/DL (ref 2.3–3.5)
GLUCOSE SERPL-MCNC: 140 MG/DL (ref 65–100)
HCT VFR BLD AUTO: 36.2 % (ref 41.1–50.3)
HGB BLD-MCNC: 12.2 G/DL (ref 13.6–17.2)
IMM GRANULOCYTES # BLD AUTO: 0 K/UL (ref 0–0.5)
IMM GRANULOCYTES NFR BLD AUTO: 0 % (ref 0–5)
LYMPHOCYTES # BLD: 1.4 K/UL (ref 0.5–4.6)
LYMPHOCYTES NFR BLD: 14 % (ref 13–44)
MAGNESIUM SERPL-MCNC: 2.2 MG/DL (ref 1.8–2.4)
MCH RBC QN AUTO: 33.4 PG (ref 26.1–32.9)
MCHC RBC AUTO-ENTMCNC: 33.7 G/DL (ref 31.4–35)
MCV RBC AUTO: 99.2 FL (ref 79.6–97.8)
MONOCYTES # BLD: 0.7 K/UL (ref 0.1–1.3)
MONOCYTES NFR BLD: 7 % (ref 4–12)
NEUTS SEG # BLD: 7.5 K/UL (ref 1.7–8.2)
NEUTS SEG NFR BLD: 77 % (ref 43–78)
NRBC # BLD: 0 K/UL (ref 0–0.2)
PLATELET # BLD AUTO: 203 K/UL (ref 150–450)
PMV BLD AUTO: 9.4 FL (ref 9.4–12.3)
POTASSIUM SERPL-SCNC: 4.1 MMOL/L (ref 3.5–5.1)
PROT SERPL-MCNC: 7.1 G/DL (ref 6.3–8.2)
RBC # BLD AUTO: 3.65 M/UL (ref 4.23–5.6)
SODIUM SERPL-SCNC: 138 MMOL/L (ref 136–145)
WBC # BLD AUTO: 9.9 K/UL (ref 4.3–11.1)

## 2021-02-16 PROCEDURE — 85025 COMPLETE CBC W/AUTO DIFF WBC: CPT

## 2021-02-16 PROCEDURE — 74011250636 HC RX REV CODE- 250/636: Performed by: EMERGENCY MEDICINE

## 2021-02-16 PROCEDURE — 83735 ASSAY OF MAGNESIUM: CPT

## 2021-02-16 PROCEDURE — 99284 EMERGENCY DEPT VISIT MOD MDM: CPT

## 2021-02-16 PROCEDURE — 96360 HYDRATION IV INFUSION INIT: CPT

## 2021-02-16 PROCEDURE — 80053 COMPREHEN METABOLIC PANEL: CPT

## 2021-02-16 PROCEDURE — 70450 CT HEAD/BRAIN W/O DYE: CPT

## 2021-02-16 RX ORDER — TRAMADOL HYDROCHLORIDE 50 MG/1
50 TABLET ORAL
COMMUNITY
Start: 2020-11-22 | End: 2021-07-22

## 2021-02-16 RX ORDER — DILTIAZEM HYDROCHLORIDE 120 MG/1
120 CAPSULE, COATED, EXTENDED RELEASE ORAL DAILY
Qty: 30 CAP | Refills: 0 | Status: SHIPPED | OUTPATIENT
Start: 2021-02-16 | End: 2021-07-22

## 2021-02-16 RX ORDER — GABAPENTIN 400 MG/1
400 CAPSULE ORAL 2 TIMES DAILY
COMMUNITY
Start: 2020-03-18 | End: 2021-07-22

## 2021-02-16 RX ORDER — HYDROCODONE BITARTRATE AND ACETAMINOPHEN 5; 325 MG/1; MG/1
1 TABLET ORAL
Qty: 12 TAB | Refills: 0 | Status: SHIPPED | OUTPATIENT
Start: 2021-02-16 | End: 2021-02-19

## 2021-02-16 RX ADMIN — SODIUM CHLORIDE 1000 ML: 900 INJECTION, SOLUTION INTRAVENOUS at 17:32

## 2021-02-16 NOTE — ED TRIAGE NOTES
Patient co dizziness that has increased for the past 2 weeks.  Patient states usually takes antivert with relief but not in the past 2 weeks patient also co left sided neck pain which is chronic

## 2022-02-28 ENCOUNTER — TRANSCRIBE ORDER (OUTPATIENT)
Dept: SCHEDULING | Age: 87
End: 2022-02-28

## 2022-02-28 DIAGNOSIS — R91.1 SOLITARY PULMONARY NODULE: Primary | ICD-10-CM

## 2022-03-18 PROBLEM — M50.30 DDD (DEGENERATIVE DISC DISEASE), CERVICAL: Status: ACTIVE | Noted: 2019-07-11

## 2022-03-19 PROBLEM — M54.2 NECK PAIN: Status: ACTIVE | Noted: 2019-07-11

## 2022-05-16 ENCOUNTER — APPOINTMENT (OUTPATIENT)
Dept: CT IMAGING | Age: 87
End: 2022-05-16
Attending: EMERGENCY MEDICINE
Payer: OTHER GOVERNMENT

## 2022-05-16 ENCOUNTER — HOSPITAL ENCOUNTER (EMERGENCY)
Age: 87
Discharge: HOME | End: 2022-05-16
Attending: EMERGENCY MEDICINE
Payer: OTHER GOVERNMENT

## 2022-05-16 ENCOUNTER — APPOINTMENT (OUTPATIENT)
Dept: GENERAL RADIOLOGY | Age: 87
End: 2022-05-16
Attending: EMERGENCY MEDICINE
Payer: OTHER GOVERNMENT

## 2022-05-16 VITALS
DIASTOLIC BLOOD PRESSURE: 83 MMHG | RESPIRATION RATE: 14 BRPM | HEART RATE: 71 BPM | OXYGEN SATURATION: 94 % | SYSTOLIC BLOOD PRESSURE: 161 MMHG | BODY MASS INDEX: 28.93 KG/M2 | TEMPERATURE: 98.3 F | HEIGHT: 66 IN | WEIGHT: 180 LBS

## 2022-05-16 DIAGNOSIS — R07.89 CHEST WALL PAIN: Primary | ICD-10-CM

## 2022-05-16 LAB
ALBUMIN SERPL-MCNC: 3 G/DL (ref 3.2–4.6)
ALBUMIN/GLOB SERPL: 0.7 {RATIO} (ref 1.2–3.5)
ALP SERPL-CCNC: 79 U/L (ref 50–136)
ALT SERPL-CCNC: 19 U/L (ref 12–65)
ANION GAP SERPL CALC-SCNC: 9 MMOL/L (ref 7–16)
AST SERPL-CCNC: 20 U/L (ref 15–37)
BASOPHILS # BLD: 0.1 K/UL (ref 0–0.2)
BASOPHILS NFR BLD: 1 % (ref 0–2)
BILIRUB SERPL-MCNC: 0.3 MG/DL (ref 0.2–1.1)
BUN SERPL-MCNC: 23 MG/DL (ref 8–23)
CALCIUM SERPL-MCNC: 9.4 MG/DL (ref 8.3–10.4)
CHLORIDE SERPL-SCNC: 107 MMOL/L (ref 98–107)
CO2 SERPL-SCNC: 25 MMOL/L (ref 21–32)
CREAT SERPL-MCNC: 1.14 MG/DL (ref 0.8–1.5)
D DIMER PPP FEU-MCNC: 3.19 UG/ML(FEU)
DIFFERENTIAL METHOD BLD: ABNORMAL
EOSINOPHIL # BLD: 0.3 K/UL (ref 0–0.8)
EOSINOPHIL NFR BLD: 5 % (ref 0.5–7.8)
ERYTHROCYTE [DISTWIDTH] IN BLOOD BY AUTOMATED COUNT: 13.5 % (ref 11.9–14.6)
GLOBULIN SER CALC-MCNC: 4.3 G/DL (ref 2.3–3.5)
GLUCOSE SERPL-MCNC: 93 MG/DL (ref 65–100)
HCT VFR BLD AUTO: 39.5 % (ref 41.1–50.3)
HGB BLD-MCNC: 12.8 G/DL (ref 13.6–17.2)
IMM GRANULOCYTES # BLD AUTO: 0 K/UL (ref 0–0.5)
IMM GRANULOCYTES NFR BLD AUTO: 0 % (ref 0–5)
LIPASE SERPL-CCNC: 26 U/L (ref 73–393)
LYMPHOCYTES # BLD: 1.1 K/UL (ref 0.5–4.6)
LYMPHOCYTES NFR BLD: 20 % (ref 13–44)
MAGNESIUM SERPL-MCNC: 2.4 MG/DL (ref 1.8–2.4)
MCH RBC QN AUTO: 31.8 PG (ref 26.1–32.9)
MCHC RBC AUTO-ENTMCNC: 32.4 G/DL (ref 31.4–35)
MCV RBC AUTO: 98.3 FL (ref 79.6–97.8)
MONOCYTES # BLD: 0.6 K/UL (ref 0.1–1.3)
MONOCYTES NFR BLD: 10 % (ref 4–12)
NEUTS SEG # BLD: 3.8 K/UL (ref 1.7–8.2)
NEUTS SEG NFR BLD: 65 % (ref 43–78)
NRBC # BLD: 0 K/UL (ref 0–0.2)
PLATELET # BLD AUTO: 174 K/UL (ref 150–450)
PMV BLD AUTO: 9.5 FL (ref 9.4–12.3)
POTASSIUM SERPL-SCNC: 4.4 MMOL/L (ref 3.5–5.1)
PROT SERPL-MCNC: 7.3 G/DL (ref 6.3–8.2)
RBC # BLD AUTO: 4.02 M/UL (ref 4.23–5.6)
SODIUM SERPL-SCNC: 141 MMOL/L (ref 136–145)
TROPONIN-HIGH SENSITIVITY: 10.4 PG/ML (ref 0–14)
WBC # BLD AUTO: 5.8 K/UL (ref 4.3–11.1)

## 2022-05-16 PROCEDURE — 93005 ELECTROCARDIOGRAM TRACING: CPT | Performed by: EMERGENCY MEDICINE

## 2022-05-16 PROCEDURE — 80053 COMPREHEN METABOLIC PANEL: CPT

## 2022-05-16 PROCEDURE — 71260 CT THORAX DX C+: CPT

## 2022-05-16 PROCEDURE — 99285 EMERGENCY DEPT VISIT HI MDM: CPT

## 2022-05-16 PROCEDURE — 71046 X-RAY EXAM CHEST 2 VIEWS: CPT

## 2022-05-16 PROCEDURE — 74011000258 HC RX REV CODE- 258: Performed by: EMERGENCY MEDICINE

## 2022-05-16 PROCEDURE — 94762 N-INVAS EAR/PLS OXIMTRY CONT: CPT

## 2022-05-16 PROCEDURE — 74011000250 HC RX REV CODE- 250: Performed by: EMERGENCY MEDICINE

## 2022-05-16 PROCEDURE — 85379 FIBRIN DEGRADATION QUANT: CPT

## 2022-05-16 PROCEDURE — 83735 ASSAY OF MAGNESIUM: CPT

## 2022-05-16 PROCEDURE — 83690 ASSAY OF LIPASE: CPT

## 2022-05-16 PROCEDURE — 84484 ASSAY OF TROPONIN QUANT: CPT

## 2022-05-16 PROCEDURE — 74011000636 HC RX REV CODE- 636: Performed by: EMERGENCY MEDICINE

## 2022-05-16 PROCEDURE — 85025 COMPLETE CBC W/AUTO DIFF WBC: CPT

## 2022-05-16 RX ORDER — SODIUM CHLORIDE 0.9 % (FLUSH) 0.9 %
5-10 SYRINGE (ML) INJECTION EVERY 8 HOURS
Status: DISCONTINUED | OUTPATIENT
Start: 2022-05-16 | End: 2022-05-17 | Stop reason: HOSPADM

## 2022-05-16 RX ORDER — SODIUM CHLORIDE 0.9 % (FLUSH) 0.9 %
10 SYRINGE (ML) INJECTION
Status: COMPLETED | OUTPATIENT
Start: 2022-05-16 | End: 2022-05-16

## 2022-05-16 RX ORDER — OXYCODONE AND ACETAMINOPHEN 5; 325 MG/1; MG/1
1 TABLET ORAL
Qty: 20 TABLET | Refills: 0 | Status: SHIPPED | OUTPATIENT
Start: 2022-05-16 | End: 2022-05-21

## 2022-05-16 RX ORDER — SODIUM CHLORIDE 0.9 % (FLUSH) 0.9 %
5-10 SYRINGE (ML) INJECTION AS NEEDED
Status: DISCONTINUED | OUTPATIENT
Start: 2022-05-16 | End: 2022-05-17 | Stop reason: HOSPADM

## 2022-05-16 RX ORDER — DICLOFENAC SODIUM 50 MG/1
50 TABLET, DELAYED RELEASE ORAL 2 TIMES DAILY
Qty: 20 TABLET | Refills: 0 | Status: SHIPPED | OUTPATIENT
Start: 2022-05-16 | End: 2022-05-26

## 2022-05-16 RX ADMIN — SODIUM CHLORIDE, PRESERVATIVE FREE 10 ML: 5 INJECTION INTRAVENOUS at 21:33

## 2022-05-16 RX ADMIN — IOPAMIDOL 100 ML: 755 INJECTION, SOLUTION INTRAVENOUS at 21:32

## 2022-05-16 RX ADMIN — SODIUM CHLORIDE 100 ML: 900 INJECTION, SOLUTION INTRAVENOUS at 21:32

## 2022-05-16 NOTE — ED TRIAGE NOTES
Pt states he started experiencing chest pain 2 nights ago in the left arm and upper left chest area. Pt admits to being a CA pt and received radiation treatment today and was very dizzy and weak upon completion. Pt admits to Vertigo diagnosis.

## 2022-05-17 LAB
ATRIAL RATE: 86 BPM
CALCULATED P AXIS, ECG09: 33 DEGREES
CALCULATED R AXIS, ECG10: -2 DEGREES
CALCULATED T AXIS, ECG11: 18 DEGREES
DIAGNOSIS, 93000: NORMAL
P-R INTERVAL, ECG05: 164 MS
Q-T INTERVAL, ECG07: 360 MS
QRS DURATION, ECG06: 84 MS
QTC CALCULATION (BEZET), ECG08: 430 MS
VENTRICULAR RATE, ECG03: 86 BPM

## 2022-05-17 NOTE — ED PROVIDER NOTES
Patient presents emerged from complaining of a left-sided chest pain that woke him from sleep about 5 AM this morning. Is been present constantly since its onset. He has been having left arm pain that is been constant for the past few days. He does have a history of lung cancer and has been receiving radiation therapy for a total of 9 treatment so far. He denies any shortness of breath, he denies any fever, chills or congestion. He denies any history of venous thromboembolic disease. He denies history of cardiac disease. No increasing or decreasing factors associated with the pain. The history is provided by the patient. Chest Pain (Angina)   This is a new problem. The current episode started 6 to 12 hours ago. The problem has not changed since onset. The pain is associated with rest. The pain is present in the left side. The pain is moderate. Quality: Quality varies. Radiates to: Left scapular area. Exacerbated by: No increasing factors noted. Pertinent negatives include no back pain, no claudication, no cough, no diaphoresis, no dizziness, no exertional chest pressure, no fever, no headaches, no hemoptysis, no irregular heartbeat, no leg pain, no lower extremity edema, no nausea, no orthopnea, no palpitations, no PND, no shortness of breath, no sputum production, no vomiting and no weakness. Treatments tried: Scription pain medicine. The treatment provided mild relief. Risk factors include male gender, hypertension and dyslipidemia. His past medical history is significant for cancer.        Past Medical History:   Diagnosis Date    Hypercholesterolemia     Hypertension     Vertigo     Vertigo       Past Surgical History:   Procedure Laterality Date    HX APPENDECTOMY      HX GI  1964    Colon resection    NEUROLOGICAL PROCEDURE UNLISTED  1988    neck         Family History:   Problem Relation Age of Onset    Lupus Mother     OSTEOARTHRITIS Mother     Hypertension Mother     Hypertension Father  Stroke Father        Social History     Socioeconomic History    Marital status:      Spouse name: Not on file    Number of children: Not on file    Years of education: Not on file    Highest education level: Not on file   Occupational History    Not on file   Tobacco Use    Smoking status: Never Smoker    Smokeless tobacco: Never Used   Substance and Sexual Activity    Alcohol use: No    Drug use: No    Sexual activity: Not on file   Other Topics Concern    Not on file   Social History Narrative    Not on file     Social Determinants of Health     Financial Resource Strain:     Difficulty of Paying Living Expenses: Not on file   Food Insecurity:     Worried About Running Out of Food in the Last Year: Not on file    Kathy of Food in the Last Year: Not on file   Transportation Needs:     Lack of Transportation (Medical): Not on file    Lack of Transportation (Non-Medical):  Not on file   Physical Activity:     Days of Exercise per Week: Not on file    Minutes of Exercise per Session: Not on file   Stress:     Feeling of Stress : Not on file   Social Connections:     Frequency of Communication with Friends and Family: Not on file    Frequency of Social Gatherings with Friends and Family: Not on file    Attends Mosque Services: Not on file    Active Member of [address] [place] [address] [employer] or Organizations: Not on file    Attends Club or Organization Meetings: Not on file    Marital Status: Not on file   Intimate Partner Violence:     Fear of Current or Ex-Partner: Not on file    Emotionally Abused: Not on file    Physically Abused: Not on file    Sexually Abused: Not on file   Housing Stability:     Unable to Pay for Housing in the Last Year: Not on file    Number of [place] in the Last Year: Not on file    Unstable Housing in the Last Year: Not on file         ALLERGIES: Bupropion, Clonidine, Doxazosin, Hydrochlorothiazide, Lipitor [atorvastatin], Lisinopril, Metoprolol, and Simvastatin    Review of Systems   Constitutional: Negative for diaphoresis and fever. Respiratory: Negative for cough, hemoptysis, sputum production and shortness of breath. Cardiovascular: Positive for chest pain. Negative for palpitations, orthopnea, claudication and PND. Gastrointestinal: Negative for nausea and vomiting. Musculoskeletal: Negative for back pain. Neurological: Negative for dizziness, weakness and headaches. All other systems reviewed and are negative. Vitals:    05/16/22 1959 05/16/22 2003 05/16/22 2045 05/16/22 2055   BP: (!) 142/82  127/68    Pulse: 81  67    Resp: 16 22    Temp: 98.3 °F (36.8 °C)      SpO2: 92%  92% 92%   Weight:  81.6 kg (180 lb)     Height:  5' 6\" (1.676 m)              Physical Exam  Vitals and nursing note reviewed. Constitutional:       General: He is not in acute distress. Appearance: Normal appearance. He is not ill-appearing, toxic-appearing or diaphoretic. HENT:      Head: Normocephalic and atraumatic. Mouth/Throat:      Mouth: Mucous membranes are moist.      Pharynx: Oropharynx is clear. Eyes:      Extraocular Movements: Extraocular movements intact. Conjunctiva/sclera: Conjunctivae normal.      Pupils: Pupils are equal, round, and reactive to light. Cardiovascular:      Rate and Rhythm: Normal rate and regular rhythm. Pulses: Normal pulses. Heart sounds: Normal heart sounds. No murmur heard. No friction rub. No gallop. Pulmonary:      Effort: Pulmonary effort is normal.      Breath sounds: Normal breath sounds. Abdominal:      General: There is no distension. Palpations: Abdomen is soft. Tenderness: There is no abdominal tenderness. There is no right CVA tenderness, left CVA tenderness or guarding. Musculoskeletal:      Cervical back: Normal range of motion and neck supple. No rigidity or tenderness. Lymphadenopathy:      Cervical: No cervical adenopathy.    Skin:     General: Skin is warm and dry.      Capillary Refill: Capillary refill takes less than 2 seconds. Findings: No rash. Neurological:      General: No focal deficit present. Mental Status: He is alert and oriented to person, place, and time. Mental status is at baseline. Psychiatric:         Mood and Affect: Mood normal.         Behavior: Behavior normal.         Thought Content: Thought content normal.          MDM  Number of Diagnoses or Management Options  Diagnosis management comments: Differential diagnosis of coronary disease and current cardiac ischemia although this is remote given the duration of the patient's symptoms with a normal EKG. Also would need to consider venous thromboembolic disease given risk factor of cancer. Pain being primarily left-sided may be related to cancer. Amount and/or Complexity of Data Reviewed  Clinical lab tests: ordered and reviewed  Tests in the radiology section of CPT®: ordered and reviewed  Independent visualization of images, tracings, or specimens: yes (EKG at 1955: Is normal sinus rhythm, rate of 86, no acute ischemic changes are noted. In comparison to previous EKG  reviewed by cardiology which was interpreted as normal, no change noted.)    Risk of Complications, Morbidity, and/or Mortality  Presenting problems: moderate  Diagnostic procedures: moderate  Management options: moderate  General comments: CT was negative for pulmonary embolism. Incidental note of adrenal mass was noted. This was present on previous CTs and did not show activity on PET scan. Patient has a normal troponin and a normal EKG in the face of greater than 12 hours of constant left-sided chest pain making ischemic disease very unlikely. Symptoms and signs most likely related to inflammatory response to radiation therapy.     Patient Progress  Patient progress: stable         Procedures

## 2022-05-17 NOTE — ED NOTES
I have reviewed discharge instructions with the patient. The patient verbalized understanding. Patient left ED via Discharge Method: ambulatory to Home with family. Opportunity for questions and clarification provided. Patient given 2 scripts. To continue your aftercare when you leave the hospital, you may receive an automated call from our care team to check in on how you are doing. This is a free service and part of our promise to provide the best care and service to meet your aftercare needs.  If you have questions, or wish to unsubscribe from this service please call [phone]. Thank you for Choosing our [address] [place] [address] Emergency Department.

## 2022-09-09 ENCOUNTER — HOSPITAL ENCOUNTER (EMERGENCY)
Age: 87
Discharge: HOME | End: 2022-09-09
Attending: EMERGENCY MEDICINE
Payer: MEDICARE

## 2022-09-09 ENCOUNTER — HOSPITAL ENCOUNTER (EMERGENCY)
Dept: GENERAL RADIOLOGY | Age: 87
Discharge: HOME | End: 2022-09-12
Payer: MEDICARE

## 2022-09-09 VITALS
TEMPERATURE: 97.4 F | HEIGHT: 66 IN | RESPIRATION RATE: 23 BRPM | SYSTOLIC BLOOD PRESSURE: 142 MMHG | DIASTOLIC BLOOD PRESSURE: 84 MMHG | OXYGEN SATURATION: 92 % | WEIGHT: 180 LBS | HEART RATE: 90 BPM | BODY MASS INDEX: 28.93 KG/M2

## 2022-09-09 DIAGNOSIS — G89.29 CHRONIC LEFT SHOULDER PAIN: Primary | ICD-10-CM

## 2022-09-09 DIAGNOSIS — R07.9 CHEST PAIN, UNSPECIFIED TYPE: ICD-10-CM

## 2022-09-09 DIAGNOSIS — M25.512 CHRONIC LEFT SHOULDER PAIN: Primary | ICD-10-CM

## 2022-09-09 LAB
ALBUMIN SERPL-MCNC: 3.9 G/DL (ref 3.2–4.6)
ALBUMIN/GLOB SERPL: 1.1 {RATIO} (ref 1.2–3.5)
ALP SERPL-CCNC: 89 U/L (ref 50–136)
ALT SERPL-CCNC: 25 U/L (ref 12–65)
ANION GAP SERPL CALC-SCNC: 4 MMOL/L (ref 4–13)
AST SERPL-CCNC: 30 U/L (ref 15–37)
BILIRUB SERPL-MCNC: 0.7 MG/DL (ref 0.2–1.1)
BUN SERPL-MCNC: 20 MG/DL (ref 8–23)
CALCIUM SERPL-MCNC: 9.4 MG/DL (ref 8.3–10.4)
CHLORIDE SERPL-SCNC: 107 MMOL/L (ref 101–110)
CO2 SERPL-SCNC: 27 MMOL/L (ref 21–32)
CREAT SERPL-MCNC: 1.05 MG/DL (ref 0.8–1.5)
EKG ATRIAL RATE: 84 BPM
EKG DIAGNOSIS: NORMAL
EKG P AXIS: 8 DEGREES
EKG P-R INTERVAL: 136 MS
EKG Q-T INTERVAL: 372 MS
EKG QRS DURATION: 82 MS
EKG QTC CALCULATION (BAZETT): 439 MS
EKG R AXIS: 3 DEGREES
EKG T AXIS: 4 DEGREES
EKG VENTRICULAR RATE: 84 BPM
ERYTHROCYTE [DISTWIDTH] IN BLOOD BY AUTOMATED COUNT: 12.9 % (ref 11.9–14.6)
GLOBULIN SER CALC-MCNC: 3.7 G/DL (ref 2.3–3.5)
GLUCOSE SERPL-MCNC: 101 MG/DL (ref 65–100)
HCT VFR BLD AUTO: 39.4 % (ref 41.1–50.3)
HGB BLD-MCNC: 13.1 G/DL (ref 13.6–17.2)
LIPASE SERPL-CCNC: 35 U/L (ref 73–393)
MAGNESIUM SERPL-MCNC: 2.3 MG/DL (ref 1.8–2.4)
MCH RBC QN AUTO: 33.1 PG (ref 26.1–32.9)
MCHC RBC AUTO-ENTMCNC: 33.2 G/DL (ref 31.4–35)
MCV RBC AUTO: 99.5 FL (ref 79.6–97.8)
NRBC # BLD: 0 K/UL (ref 0–0.2)
PLATELET # BLD AUTO: 193 K/UL (ref 150–450)
PMV BLD AUTO: 9.4 FL (ref 9.4–12.3)
POTASSIUM SERPL-SCNC: 5.2 MMOL/L (ref 3.5–5.1)
PROT SERPL-MCNC: 7.6 G/DL (ref 6.3–8.2)
RBC # BLD AUTO: 3.96 M/UL (ref 4.23–5.6)
SODIUM SERPL-SCNC: 138 MMOL/L (ref 136–145)
TROPONIN I SERPL HS-MCNC: 7.3 PG/ML (ref 0–14)
WBC # BLD AUTO: 8.4 K/UL (ref 4.3–11.1)

## 2022-09-09 PROCEDURE — 99285 EMERGENCY DEPT VISIT HI MDM: CPT

## 2022-09-09 PROCEDURE — 96375 TX/PRO/DX INJ NEW DRUG ADDON: CPT

## 2022-09-09 PROCEDURE — 96374 THER/PROPH/DIAG INJ IV PUSH: CPT

## 2022-09-09 PROCEDURE — 71046 X-RAY EXAM CHEST 2 VIEWS: CPT

## 2022-09-09 PROCEDURE — 85027 COMPLETE CBC AUTOMATED: CPT

## 2022-09-09 PROCEDURE — 83690 ASSAY OF LIPASE: CPT

## 2022-09-09 PROCEDURE — 6360000002 HC RX W HCPCS: Performed by: EMERGENCY MEDICINE

## 2022-09-09 PROCEDURE — 83735 ASSAY OF MAGNESIUM: CPT

## 2022-09-09 PROCEDURE — 93005 ELECTROCARDIOGRAM TRACING: CPT | Performed by: EMERGENCY MEDICINE

## 2022-09-09 PROCEDURE — 84484 ASSAY OF TROPONIN QUANT: CPT

## 2022-09-09 PROCEDURE — 80053 COMPREHEN METABOLIC PANEL: CPT

## 2022-09-09 RX ORDER — ONDANSETRON 2 MG/ML
4 INJECTION INTRAMUSCULAR; INTRAVENOUS
Status: COMPLETED | OUTPATIENT
Start: 2022-09-09 | End: 2022-09-09

## 2022-09-09 RX ORDER — OXYCODONE AND ACETAMINOPHEN 10; 325 MG/1; MG/1
1 TABLET ORAL EVERY 6 HOURS PRN
Qty: 15 TABLET | Refills: 0 | Status: SHIPPED | OUTPATIENT
Start: 2022-09-09 | End: 2022-09-14

## 2022-09-09 RX ORDER — MORPHINE SULFATE 10 MG/ML
6 INJECTION, SOLUTION INTRAMUSCULAR; INTRAVENOUS
Status: COMPLETED | OUTPATIENT
Start: 2022-09-09 | End: 2022-09-09

## 2022-09-09 RX ADMIN — ONDANSETRON 4 MG: 2 INJECTION INTRAMUSCULAR; INTRAVENOUS at 14:13

## 2022-09-09 RX ADMIN — MORPHINE SULFATE 6 MG: 10 INJECTION INTRAVENOUS at 14:17

## 2022-09-09 ASSESSMENT — ENCOUNTER SYMPTOMS
DIARRHEA: 0
NAUSEA: 0
BACK PAIN: 0
COUGH: 0
COLOR CHANGE: 0
RHINORRHEA: 0
SHORTNESS OF BREATH: 0
VOMITING: 0
ABDOMINAL PAIN: 0
CONSTIPATION: 0
SORE THROAT: 0

## 2022-09-09 ASSESSMENT — PAIN SCALES - GENERAL
PAINLEVEL_OUTOF10: 4
PAINLEVEL_OUTOF10: 9
PAINLEVEL_OUTOF10: 8
PAINLEVEL_OUTOF10: 4

## 2022-09-09 ASSESSMENT — PAIN DESCRIPTION - ORIENTATION
ORIENTATION: LEFT

## 2022-09-09 ASSESSMENT — PAIN DESCRIPTION - DESCRIPTORS: DESCRIPTORS: STABBING

## 2022-09-09 ASSESSMENT — PAIN DESCRIPTION - LOCATION
LOCATION: SHOULDER
LOCATION: CHEST
LOCATION: SHOULDER

## 2022-09-09 ASSESSMENT — PAIN - FUNCTIONAL ASSESSMENT
PAIN_FUNCTIONAL_ASSESSMENT: 0-10
PAIN_FUNCTIONAL_ASSESSMENT: 0-10

## 2022-09-09 NOTE — ED NOTES
I have reviewed discharge instructions with the patient. The patient verbalized understanding. Patient left ED via private vehicle. Discharge Method: ambulatory to Home with wife. Opportunity for questions and clarification provided. Patient given 1 scripts. To continue your aftercare when you leave the hospital, you may receive an automated call from our care team to check in on how you are doing. This is a free service and part of our promise to provide the best care and service to meet your aftercare needs.  If you have questions, or wish to unsubscribe from this service please call [phone].   Thank you for Choosing our [facility] Emergency Department.  ,     Bryn eMndoza RN  09/09/22 [id number]

## 2022-09-09 NOTE — ED TRIAGE NOTES
Pt states he is a lung cancer pt and has experienced left shoulder, left side of chest and left arm pain for the past few days and was told by PCP to come to the ED for possible referred chest pain. Pt states pain management medications have not relieved the pain.

## 2022-09-09 NOTE — ED PROVIDER NOTES
Emergency Department Provider Note                   PCP:                KALIE Baez NP               Age: 81 y.o. Sex: male       ICD-10-CM    1. Chronic left shoulder pain  M25.512     G89.29       2. Chest pain, unspecified type  R07.9 oxyCODONE-acetaminophen (PERCOCET)  MG per tablet          DISPOSITION Decision To Discharge 09/09/2022 02:54:09 PM       MDM  Number of Diagnoses or Management Options  Chest pain, unspecified type  Chronic left shoulder pain  Diagnosis management comments: Patient with chronic pain in his left neck left shoulder and left chest.  Relieved with morphine here. Has pain management. Will discharge with Percocet and outpatient follow-up. Amount and/or Complexity of Data Reviewed  Clinical lab tests: ordered and reviewed  Tests in the radiology section of CPT®: ordered and reviewed  Tests in the medicine section of CPT®: ordered and reviewed    Patient Progress  Patient progress: stable       Orders Placed This Encounter   Procedures    XR CHEST (2 VW)    CBC    Comprehensive Metabolic Panel    Troponin    Magnesium    Lipase    Cardiac Monitor    Pulse Oximetry    EKG 12 Lead    Saline lock IV        Medications   morphine injection 6 mg (6 mg IntraVENous Given 9/9/22 1417)   ondansetron (ZOFRAN) injection 4 mg (4 mg IntraVENous Given 9/9/22 1413)       New Prescriptions    OXYCODONE-ACETAMINOPHEN (PERCOCET)  MG PER TABLET    Take 1 tablet by mouth every 6 hours as needed for Pain for up to 5 days. Intended supply: 27 days        Jessica Hunt is a 81 y.o. male who presents to the Emergency Department with chief complaint of    Chief Complaint   Patient presents with    Arm Pain    Shoulder Pain     Pain management pt     Chest Pain      Patient with chronic pain and pain management taking Percocet. Has a history of neck pains for years status post fifth vertebra removal.  Also has a torn left rotator cuff.   2 months ago had radiation to his left chest for lung cancer stage I. Has pain from all of these things and for the past couple of days pain has been worse. Taking his Percocet without relief. Denies any injury. No fall. Nothing makes the pain worse or better. No shortness of breath nausea numbness or diaphoresis. The history is provided by the patient. No  was used. Arm Pain  This is a chronic problem. The current episode started more than 2 days ago. The problem occurs constantly. The problem has been gradually worsening. Associated symptoms include chest pain. Pertinent negatives include no abdominal pain, no headaches and no shortness of breath. Nothing aggravates the symptoms. Nothing relieves the symptoms. He has tried nothing for the symptoms. Shoulder Pain  Associated symptoms include chest pain. Pertinent negatives include no abdominal pain, no headaches and no shortness of breath. Chest Pain  Associated symptoms: no abdominal pain, no back pain, no cough, no fever, no headache, no nausea, no numbness, no palpitations, no shortness of breath and no vomiting      All other systems reviewed and are negative unless otherwise stated in the history of present illness section. Review of Systems   Constitutional:  Negative for chills and fever. HENT:  Negative for rhinorrhea and sore throat. Respiratory:  Negative for cough and shortness of breath. Cardiovascular:  Positive for chest pain. Negative for palpitations. Gastrointestinal:  Negative for abdominal pain, constipation, diarrhea, nausea and vomiting. Genitourinary:  Negative for dysuria and hematuria. Musculoskeletal:  Positive for arthralgias. Negative for back pain and neck pain. Skin:  Negative for color change and rash. Neurological:  Negative for numbness and headaches. All other systems reviewed and are negative.     Past Medical History:   Diagnosis Date    Hypercholesterolemia     Hypertension     Vertigo     Vertigo Past Surgical History:   Procedure Laterality Date    APPENDECTOMY      GI  1964    Colon resection    NEUROLOGICAL SURGERY  1988    neck        Family History   Problem Relation Age of Onset    Hypertension Father     Hypertension Mother     Osteoarthritis Mother     Stroke Father     Lupus Mother         Social History     Socioeconomic History    Marital status:    Tobacco Use    Smoking status: Never    Smokeless tobacco: Never   Substance and Sexual Activity    Alcohol use: No    Drug use: No        Allergies: Atorvastatin, Bupropion, Clonidine, Doxazosin, Hydrochlorothiazide, Lisinopril, Metoprolol, and Simvastatin    Previous Medications    ALLOPURINOL (ZYLOPRIM) 300 MG TABLET    Take 100 mg by mouth daily    ASPIRIN 81 MG CHEWABLE TABLET    Take 81 mg by mouth daily    ATORVASTATIN (LIPITOR) 20 MG TABLET    Take 10 mg by mouth daily    CETIRIZINE HCL (ZYRTEC ALLERGY) 10 MG CAPS    Take by mouth    LIDOCAINE (LMX) 4 % CREAM    Apply topically    LOSARTAN (COZAAR) 50 MG TABLET    Take by mouth daily    MECLIZINE (ANTIVERT) 25 MG TABLET    Take 25 mg by mouth 3 times daily as needed    MENTHOL-METHYL SALICYLATE (THERA-GESIC) 0.5-15 % CREA    Apply topically 3 times daily    METHOCARBAMOL (ROBAXIN) 750 MG TABLET    Take by mouth 3 times daily    METRONIDAZOLE (METROCREAM) 0.75 % CREAM    Apply topically 2 times daily    NAPROXEN (NAPROSYN) 375 MG TABLET    Take 375 mg by mouth 2 times daily (with meals)    OMEPRAZOLE (PRILOSEC) 20 MG DELAYED RELEASE CAPSULE    Take 20 mg by mouth daily    TRIAMCINOLONE (KENALOG) 0.025 % CREAM    Apply topically 2 times daily        Vitals signs and nursing note reviewed.    Patient Vitals for the past 4 hrs:   Temp Pulse Resp BP SpO2   09/09/22 1439 -- 96 22 (!) 153/92 95 %   09/09/22 1359 -- 82 21 (!) 151/74 94 %   09/09/22 1329 -- 86 19 (!) 195/84 96 %   09/09/22 1319 -- 80 20 (!) 176/86 93 %   09/09/22 1140 97.4 °F (36.3 °C) 81 17 (!) 159/95 95 %          Physical Exam  Vitals and nursing note reviewed. Constitutional:       Appearance: Normal appearance. HENT:      Head: Normocephalic and atraumatic. Cardiovascular:      Rate and Rhythm: Normal rate and regular rhythm. Pulmonary:      Effort: Pulmonary effort is normal.      Breath sounds: Normal breath sounds. No wheezing. Chest:      Chest wall: Tenderness (mild left chest up into shoulder.) present. Abdominal:      General: Bowel sounds are normal.      Palpations: Abdomen is soft. Tenderness: There is no abdominal tenderness. Musculoskeletal:         General: Tenderness (left shoulder mild) present. No swelling. Normal range of motion. Cervical back: Normal range of motion. No tenderness. Skin:     General: Skin is warm and dry. Neurological:      Mental Status: He is alert.         Procedures    ED EKG Interpretation  EKG was interpreted in the absence of a cardiologist.    Rate: Rate: Normal  EKG Interpretation: EKG Interpretation: sinus rhythm  ST Segments: Nonspecific ST segments - NO STEMI    Results Include:    Recent Results (from the past 24 hour(s))   EKG 12 Lead    Collection Time: 09/09/22 11:47 AM   Result Value Ref Range    Ventricular Rate 84 BPM    Atrial Rate 84 BPM    P-R Interval 136 ms    QRS Duration 82 ms    Q-T Interval 372 ms    QTc Calculation (Bazett) 439 ms    P Axis 8 degrees    R Axis 3 degrees    T Axis 4 degrees    Diagnosis       Sinus rhythm with Premature atrial complexes with Aberrant conduction   CBC    Collection Time: 09/09/22 11:58 AM   Result Value Ref Range    WBC 8.4 4.3 - 11.1 K/uL    RBC 3.96 (L) 4.23 - 5.6 M/uL    Hemoglobin 13.1 (L) 13.6 - 17.2 g/dL    Hematocrit 39.4 (L) 41.1 - 50.3 %    MCV 99.5 (H) 79.6 - 97.8 FL    MCH 33.1 (H) 26.1 - 32.9 PG    MCHC 33.2 31.4 - 35.0 g/dL    RDW 12.9 11.9 - 14.6 %    Platelets 321 630 - 699 K/uL    MPV 9.4 9.4 - 12.3 FL    nRBC 0.00 0.0 - 0.2 K/uL   Comprehensive Metabolic Panel    Collection Time: 09/09/22 11:58 AM   Result Value Ref Range    Sodium 138 136 - 145 mmol/L    Potassium 5.2 (H) 3.5 - 5.1 mmol/L    Chloride 107 101 - 110 mmol/L    CO2 27 21 - 32 mmol/L    Anion Gap 4 4 - 13 mmol/L    Glucose 101 (H) 65 - 100 mg/dL    BUN 20 8 - 23 MG/DL    Creatinine 1.05 0.8 - 1.5 MG/DL    GFR African American >60 >60 ml/min/1.73m2    GFR Non- >60 >60 ml/min/1.73m2    Calcium 9.4 8.3 - 10.4 MG/DL    Total Bilirubin 0.7 0.2 - 1.1 MG/DL    ALT 25 12 - 65 U/L    AST 30 15 - 37 U/L    Alk Phosphatase 89 50 - 136 U/L    Total Protein 7.6 6.3 - 8.2 g/dL    Albumin 3.9 3.2 - 4.6 g/dL    Globulin 3.7 (H) 2.3 - 3.5 g/dL    Albumin/Globulin Ratio 1.1 (L) 1.2 - 3.5     Troponin    Collection Time: 09/09/22 11:58 AM   Result Value Ref Range    Troponin, High Sensitivity 7.3 0 - 14 pg/mL   Magnesium    Collection Time: 09/09/22 11:58 AM   Result Value Ref Range    Magnesium 2.3 1.8 - 2.4 mg/dL   Lipase    Collection Time: 09/09/22 11:58 AM   Result Value Ref Range    Lipase 35 (L) 73 - 393 U/L          XR CHEST (2 VW)   Final Result   1. No acute cardiopulmonary abnormality. 2. Stable scarring or atelectasis in the left midlung. Voice dictation software was used during the making of this note. This software is not perfect and grammatical and other typographical errors may be present. This note has not been completely proofread for errors.      Isaura Benoit MD  09/09/22 [id number]

## 2022-10-06 ENCOUNTER — HOSPITAL ENCOUNTER (EMERGENCY)
Age: 87
Discharge: HOME | End: 2022-10-07
Attending: EMERGENCY MEDICINE
Payer: MEDICARE

## 2022-10-06 DIAGNOSIS — R53.83 OTHER FATIGUE: Primary | ICD-10-CM

## 2022-10-06 DIAGNOSIS — M25.512 CHRONIC LEFT SHOULDER PAIN: ICD-10-CM

## 2022-10-06 DIAGNOSIS — G89.29 CHRONIC LEFT SHOULDER PAIN: ICD-10-CM

## 2022-10-06 LAB
ALBUMIN SERPL-MCNC: 3.3 G/DL (ref 3.2–4.6)
ALBUMIN/GLOB SERPL: 0.9 {RATIO} (ref 1.2–3.5)
ALP SERPL-CCNC: 70 U/L (ref 50–136)
ALT SERPL-CCNC: 25 U/L (ref 12–65)
ANION GAP SERPL CALC-SCNC: 6 MMOL/L (ref 4–13)
AST SERPL-CCNC: 23 U/L (ref 15–37)
BASOPHILS # BLD: 0.1 K/UL (ref 0–0.2)
BASOPHILS NFR BLD: 1 % (ref 0–2)
BILIRUB SERPL-MCNC: 0.5 MG/DL (ref 0.2–1.1)
BUN SERPL-MCNC: 32 MG/DL (ref 8–23)
CALCIUM SERPL-MCNC: 9.1 MG/DL (ref 8.3–10.4)
CHLORIDE SERPL-SCNC: 103 MMOL/L (ref 101–110)
CO2 SERPL-SCNC: 27 MMOL/L (ref 21–32)
CREAT SERPL-MCNC: 1.14 MG/DL (ref 0.8–1.5)
DIFFERENTIAL METHOD BLD: ABNORMAL
EOSINOPHIL # BLD: 0.4 K/UL (ref 0–0.8)
EOSINOPHIL NFR BLD: 5 % (ref 0.5–7.8)
ERYTHROCYTE [DISTWIDTH] IN BLOOD BY AUTOMATED COUNT: 13.4 % (ref 11.9–14.6)
GLOBULIN SER CALC-MCNC: 3.8 G/DL (ref 2.3–3.5)
GLUCOSE SERPL-MCNC: 107 MG/DL (ref 65–100)
HCT VFR BLD AUTO: 38.8 % (ref 41.1–50.3)
HGB BLD-MCNC: 12.7 G/DL (ref 13.6–17.2)
IMM GRANULOCYTES # BLD AUTO: 0 K/UL (ref 0–0.5)
IMM GRANULOCYTES NFR BLD AUTO: 1 % (ref 0–5)
LYMPHOCYTES # BLD: 1 K/UL (ref 0.5–4.6)
LYMPHOCYTES NFR BLD: 13 % (ref 13–44)
MAGNESIUM SERPL-MCNC: 2.5 MG/DL (ref 1.8–2.4)
MCH RBC QN AUTO: 33.4 PG (ref 26.1–32.9)
MCHC RBC AUTO-ENTMCNC: 32.7 G/DL (ref 31.4–35)
MCV RBC AUTO: 102.1 FL (ref 79.6–97.8)
MONOCYTES # BLD: 0.7 K/UL (ref 0.1–1.3)
MONOCYTES NFR BLD: 9 % (ref 4–12)
NEUTS SEG # BLD: 5.7 K/UL (ref 1.7–8.2)
NEUTS SEG NFR BLD: 72 % (ref 43–78)
NRBC # BLD: 0 K/UL (ref 0–0.2)
PLATELET # BLD AUTO: 135 K/UL (ref 150–450)
PMV BLD AUTO: 8.8 FL (ref 9.4–12.3)
POTASSIUM SERPL-SCNC: 4.6 MMOL/L (ref 3.5–5.1)
PROT SERPL-MCNC: 7.1 G/DL (ref 6.3–8.2)
RBC # BLD AUTO: 3.8 M/UL (ref 4.23–5.6)
SODIUM SERPL-SCNC: 136 MMOL/L (ref 136–145)
WBC # BLD AUTO: 7.9 K/UL (ref 4.3–11.1)

## 2022-10-06 PROCEDURE — 96374 THER/PROPH/DIAG INJ IV PUSH: CPT

## 2022-10-06 PROCEDURE — 85025 COMPLETE CBC W/AUTO DIFF WBC: CPT

## 2022-10-06 PROCEDURE — 99284 EMERGENCY DEPT VISIT MOD MDM: CPT

## 2022-10-06 PROCEDURE — 83735 ASSAY OF MAGNESIUM: CPT

## 2022-10-06 PROCEDURE — 80053 COMPREHEN METABOLIC PANEL: CPT

## 2022-10-06 PROCEDURE — 96375 TX/PRO/DX INJ NEW DRUG ADDON: CPT

## 2022-10-06 RX ORDER — ONDANSETRON 2 MG/ML
4 INJECTION INTRAMUSCULAR; INTRAVENOUS
Status: COMPLETED | OUTPATIENT
Start: 2022-10-07 | End: 2022-10-07

## 2022-10-06 RX ORDER — MORPHINE SULFATE 4 MG/ML
4 INJECTION INTRAVENOUS ONCE
Status: COMPLETED | OUTPATIENT
Start: 2022-10-07 | End: 2022-10-07

## 2022-10-06 ASSESSMENT — ENCOUNTER SYMPTOMS
TROUBLE SWALLOWING: 0
COLOR CHANGE: 0
EYE PAIN: 0
ABDOMINAL PAIN: 0
SHORTNESS OF BREATH: 0
CHEST TIGHTNESS: 0
VOICE CHANGE: 0
EYE REDNESS: 0
NAUSEA: 1
ANAL BLEEDING: 0
BACK PAIN: 0

## 2022-10-06 ASSESSMENT — PAIN SCALES - GENERAL: PAINLEVEL_OUTOF10: 9

## 2022-10-06 ASSESSMENT — PAIN - FUNCTIONAL ASSESSMENT: PAIN_FUNCTIONAL_ASSESSMENT: 0-10

## 2022-10-07 VITALS
DIASTOLIC BLOOD PRESSURE: 80 MMHG | RESPIRATION RATE: 21 BRPM | OXYGEN SATURATION: 96 % | HEART RATE: 82 BPM | WEIGHT: 180 LBS | SYSTOLIC BLOOD PRESSURE: 145 MMHG | TEMPERATURE: 97.3 F | BODY MASS INDEX: 28.93 KG/M2 | HEIGHT: 66 IN

## 2022-10-07 LAB
APPEARANCE UR: CLEAR
BACTERIA URNS QL MICRO: NEGATIVE /HPF
BILIRUB UR QL: NEGATIVE
CASTS URNS QL MICRO: ABNORMAL /LPF
COLOR UR: ABNORMAL
EPI CELLS #/AREA URNS HPF: ABNORMAL /HPF
GLUCOSE UR STRIP.AUTO-MCNC: NEGATIVE MG/DL
HGB UR QL STRIP: NEGATIVE
KETONES UR QL STRIP.AUTO: NEGATIVE MG/DL
LEUKOCYTE ESTERASE UR QL STRIP.AUTO: ABNORMAL
NITRITE UR QL STRIP.AUTO: NEGATIVE
PH UR STRIP: 5 [PH] (ref 5–9)
PROT UR STRIP-MCNC: NEGATIVE MG/DL
RBC #/AREA URNS HPF: ABNORMAL /HPF
SP GR UR REFRACTOMETRY: 1.01 (ref 1–1.02)
UROBILINOGEN UR QL STRIP.AUTO: 1 EU/DL (ref 0.2–1)
WBC URNS QL MICRO: ABNORMAL /HPF

## 2022-10-07 PROCEDURE — 96375 TX/PRO/DX INJ NEW DRUG ADDON: CPT

## 2022-10-07 PROCEDURE — 81001 URINALYSIS AUTO W/SCOPE: CPT

## 2022-10-07 PROCEDURE — 6360000002 HC RX W HCPCS: Performed by: EMERGENCY MEDICINE

## 2022-10-07 PROCEDURE — 96374 THER/PROPH/DIAG INJ IV PUSH: CPT

## 2022-10-07 RX ORDER — OXYCODONE AND ACETAMINOPHEN 10; 325 MG/1; MG/1
1 TABLET ORAL EVERY 6 HOURS PRN
Qty: 13 TABLET | Refills: 0 | Status: ON HOLD | OUTPATIENT
Start: 2022-10-07 | End: 2022-10-21

## 2022-10-07 RX ADMIN — ONDANSETRON 4 MG: 2 INJECTION INTRAMUSCULAR; INTRAVENOUS at 00:09

## 2022-10-07 RX ADMIN — MORPHINE SULFATE 4 MG: 4 INJECTION INTRAVENOUS at 00:10

## 2022-10-07 NOTE — DISCHARGE INSTRUCTIONS
Follow-up with your primary care physician  Follow-up with your orthopedist  Return to  the ER for any new, worsening or life-threatening symptoms

## 2022-10-07 NOTE — ED NOTES
I have reviewed discharge instructions with the patient. The patient verbalized understanding. Patient left ED via Discharge Method: ambulatory to Home with daughter and spouse. Opportunity for questions and clarification provided. Patient given 1 scripts. To continue your aftercare when you leave the hospital, you may receive an automated call from our care team to check in on how you are doing. This is a free service and part of our promise to provide the best care and service to meet your aftercare needs.  If you have questions, or wish to unsubscribe from this service please call [phone]. Thank you for Choosing our [address] [place] [address] Emergency Department.         Lucita Cevallos RN  10/07/22 [id number]

## 2022-10-07 NOTE — ED TRIAGE NOTES
Patient brought into triage with wife via wheelchair. Wife states that patient was diagnosed with a torn rotator cuff on September 20th. Patient states he sees the orthopedic doctor on Monday. Patient states the pain has increased today. Patient also c\o dizziness and fatigue. Patient states he has history of vertigo.

## 2022-10-07 NOTE — ED PROVIDER NOTES
Emergency Department Provider Note                   PCP:                KALIE Whitehead NP               Age: 81 y.o. Sex: male     No diagnosis found. DISPOSITION          MDM  Number of Diagnoses or Management Options  Diagnosis management comments: Minimal decreased range of motion at left shoulder. Neurovascular intact distally. Otherwise is well-appearing. Plan for screening labs and urinalysis here. 12:33 AM  Laboratory testing are stable. Still endorses a left shoulder pain. Treated here with pain medication. Will review laboratory testing here and encourage follow-up with PCP concerning reports of fatigue. 1 probably needs follow-up with orthopedic concerning his continued left shoulder pain. Amount and/or Complexity of Data Reviewed  Clinical lab tests: ordered and reviewed    Risk of Complications, Morbidity, and/or Mortality  Presenting problems: moderate  Diagnostic procedures: moderate  Management options: moderate    Patient Progress  Patient progress: stable             Orders Placed This Encounter   Procedures    CMP    CBC with Auto Differential    Magnesium    Urinalysis w rflx microscopic        Medications - No data to display    New Prescriptions    No medications on file        Mario Briscoe is a 81 y.o. male who presents to the Emergency Department with chief complaint of    Chief Complaint   Patient presents with    Shoulder Pain    Dizziness      Patient presents to the ER with complaints of generalized fatigue as well as shoulder pain. Patient has had some generalized left shoulder pain for couple weeks recently diagnosed with a rotator cuff tear. Is currently in the care of orthopedics. States she is continue to have pain. States he is at \"multiple steroid shots\" without much improvement. States pain is worse with movement. He has a secondary complaint of family is concerned about to include weakness.   States he has generally been more weak for the past couple days. States he is increasingly fatigued. Denies any fevers or vomiting. Denies any changes in bowel or bladder habits. Denies Any cough or shortness of breath. Denies any blood in stool. The history is provided by the patient. Shoulder Pain  This is a chronic problem. The current episode started more than 1 week ago. The problem occurs constantly. The problem has not changed since onset. Pertinent negatives include no chest pain, no abdominal pain, no headaches and no shortness of breath. The symptoms are aggravated by bending and walking. Nothing relieves the symptoms. Dizziness  Quality:  Lightheadedness  Severity:  Moderate  Duration:  5 days  Timing:  Intermittent  Chronicity:  New  Context: not when bending over, not with bowel movement, not with head movement and not with inactivity    Relieved by:  Nothing  Worsened by:  Nothing  Associated symptoms: nausea    Associated symptoms: no chest pain, no headaches and no shortness of breath        Review of Systems   Constitutional:  Positive for fatigue. Negative for fever and unexpected weight change. HENT:  Negative for congestion, dental problem, trouble swallowing and voice change. Eyes:  Negative for pain and redness. Respiratory:  Negative for chest tightness and shortness of breath. Cardiovascular:  Negative for chest pain. Gastrointestinal:  Positive for nausea. Negative for abdominal pain and anal bleeding. Endocrine: Negative for polydipsia. Genitourinary:  Negative for flank pain and frequency. Musculoskeletal:  Negative for back pain, gait problem and neck pain. Skin:  Negative for color change and pallor. Neurological:  Positive for dizziness. Negative for seizures, speech difficulty and headaches. Hematological:  Negative for adenopathy. Does not bruise/bleed easily. Psychiatric/Behavioral:  Negative for behavioral problems and confusion. All other systems reviewed and are negative.     Past Medical History:   Diagnosis Date    Hypercholesterolemia     Hypertension     Vertigo     Vertigo        Past Surgical History:   Procedure Laterality Date    APPENDECTOMY      GI  1964    Colon resection    NEUROLOGICAL SURGERY  1988    neck        Family History   Problem Relation Age of Onset    Hypertension Father     Hypertension Mother     Osteoarthritis Mother     Stroke Father     Lupus Mother         Social History     Socioeconomic History    Marital status:      Spouse name: None    Number of children: None    Years of education: None    Highest education level: None   Tobacco Use    Smoking status: Never    Smokeless tobacco: Never   Substance and Sexual Activity    Alcohol use: No    Drug use: No         Atorvastatin, Bupropion, Clonidine, Doxazosin, Hydrochlorothiazide, Lisinopril, Metoprolol, and Simvastatin     Previous Medications    ALLOPURINOL (ZYLOPRIM) 300 MG TABLET    Take 100 mg by mouth daily    ASPIRIN 81 MG CHEWABLE TABLET    Take 81 mg by mouth daily    ATORVASTATIN (LIPITOR) 20 MG TABLET    Take 10 mg by mouth daily    CETIRIZINE HCL (ZYRTEC ALLERGY) 10 MG CAPS    Take by mouth    LIDOCAINE (LMX) 4 % CREAM    Apply topically    LOSARTAN (COZAAR) 50 MG TABLET    Take by mouth daily    MECLIZINE (ANTIVERT) 25 MG TABLET    Take 25 mg by mouth 3 times daily as needed    MENTHOL-METHYL SALICYLATE (THERA-GESIC) 0.5-15 % CREA    Apply topically 3 times daily    METHOCARBAMOL (ROBAXIN) 750 MG TABLET    Take by mouth 3 times daily    METRONIDAZOLE (METROCREAM) 0.75 % CREAM    Apply topically 2 times daily    NAPROXEN (NAPROSYN) 375 MG TABLET    Take 375 mg by mouth 2 times daily (with meals)    OMEPRAZOLE (PRILOSEC) 20 MG DELAYED RELEASE CAPSULE    Take 20 mg by mouth daily    TRIAMCINOLONE (KENALOG) 0.025 % CREAM    Apply topically 2 times daily        Vitals signs and nursing note reviewed.    Patient Vitals for the past 4 hrs:   Temp Pulse Resp BP SpO2   10/06/22 2149 97.3 °F (36.3 °C) 86 21 (!) 143/86 95 %          Physical Exam  Vitals and nursing note reviewed. Constitutional:       Appearance: Normal appearance. He is not ill-appearing. HENT:      Head: Normocephalic and atraumatic. Right Ear: External ear normal.      Left Ear: External ear normal.      Nose: Nose normal. No congestion or rhinorrhea. Mouth/Throat:      Mouth: Mucous membranes are moist.      Pharynx: No oropharyngeal exudate or posterior oropharyngeal erythema. Eyes:      General:         Right eye: No discharge. Left eye: No discharge. Extraocular Movements: Extraocular movements intact. Pupils: Pupils are equal, round, and reactive to light. Cardiovascular:      Rate and Rhythm: Normal rate and regular rhythm. Pulses: Normal pulses. Heart sounds: Normal heart sounds. No murmur heard. No friction rub. Pulmonary:      Effort: Pulmonary effort is normal. No respiratory distress. Breath sounds: Normal breath sounds. No stridor. No wheezing. Abdominal:      General: Abdomen is flat. There is no distension. Palpations: Abdomen is soft. There is no mass. Tenderness: There is no abdominal tenderness. Hernia: No hernia is present. Musculoskeletal:         General: No swelling, tenderness, deformity or signs of injury. Left shoulder: No crepitus. Decreased range of motion. Normal strength. Cervical back: Normal range of motion. Skin:     General: Skin is warm. Capillary Refill: Capillary refill takes less than 2 seconds. Coloration: Skin is not jaundiced or pale. Findings: No bruising or erythema. Neurological:      General: No focal deficit present. Mental Status: He is alert and oriented to person, place, and time. Cranial Nerves: No cranial nerve deficit. Sensory: No sensory deficit.    Psychiatric:         Mood and Affect: Mood normal.         Behavior: Behavior normal.        Procedures    No results found for any visits on 10/06/22. No orders to display                       Voice dictation software was used during the making of this note. This software is not perfect and grammatical and other typographical errors may be present. This note has not been completely proofread for errors.      Dustin Santiago MD  10/06/22 940 MD Maria  10/07/22 9609

## 2022-10-12 NOTE — CARE COORDINATION
Face sheet left for me to follow with pt. Per MD, pt was enquiring about PT/OT. After a chart review  I see that pt sees Pain Management/Josiah Ivey Surgeon with Coretta Crespo with [facility]. I called Coretta Crespo and JEN for the RN. Attempted to call pt at #[phone] & #[phone]. There was no anwser at either # and the VM box has not been set up.

## 2022-10-20 ENCOUNTER — HOSPITAL ENCOUNTER (EMERGENCY)
Dept: GENERAL RADIOLOGY | Age: 87
Discharge: HOME | DRG: 175 | End: 2022-10-23
Payer: MEDICARE

## 2022-10-20 ENCOUNTER — HOSPITAL ENCOUNTER (EMERGENCY)
Dept: CT IMAGING | Age: 87
Discharge: HOME | DRG: 175 | End: 2022-10-23
Payer: MEDICARE

## 2022-10-20 ENCOUNTER — HOSPITAL ENCOUNTER (INPATIENT)
Age: 87
LOS: 1 days | Discharge: HOME | DRG: 175 | End: 2022-10-22
Attending: EMERGENCY MEDICINE | Admitting: INTERNAL MEDICINE
Payer: MEDICARE

## 2022-10-20 DIAGNOSIS — I26.99 ACUTE PULMONARY EMBOLISM WITHOUT ACUTE COR PULMONALE, UNSPECIFIED PULMONARY EMBOLISM TYPE (HCC): Primary | ICD-10-CM

## 2022-10-20 DIAGNOSIS — R09.02 HYPOXIA: ICD-10-CM

## 2022-10-20 LAB
ALBUMIN SERPL-MCNC: 2.9 G/DL (ref 3.2–4.6)
ALBUMIN/GLOB SERPL: 0.7 {RATIO} (ref 0.4–1.6)
ALP SERPL-CCNC: 81 U/L (ref 50–136)
ALT SERPL-CCNC: 32 U/L (ref 12–65)
ANION GAP SERPL CALC-SCNC: 6 MMOL/L (ref 2–11)
AST SERPL-CCNC: 27 U/L (ref 15–37)
BASOPHILS # BLD: 0.1 K/UL (ref 0–0.2)
BASOPHILS NFR BLD: 1 % (ref 0–2)
BILIRUB SERPL-MCNC: 0.4 MG/DL (ref 0.2–1.1)
BUN SERPL-MCNC: 22 MG/DL (ref 8–23)
CALCIUM SERPL-MCNC: 9.4 MG/DL (ref 8.3–10.4)
CHLORIDE SERPL-SCNC: 100 MMOL/L (ref 101–110)
CO2 SERPL-SCNC: 29 MMOL/L (ref 21–32)
CREAT SERPL-MCNC: 1.08 MG/DL (ref 0.8–1.5)
DIFFERENTIAL METHOD BLD: ABNORMAL
EOSINOPHIL # BLD: 0.3 K/UL (ref 0–0.8)
EOSINOPHIL NFR BLD: 5 % (ref 0.5–7.8)
ERYTHROCYTE [DISTWIDTH] IN BLOOD BY AUTOMATED COUNT: 13 % (ref 11.9–14.6)
GLOBULIN SER CALC-MCNC: 4 G/DL (ref 2.8–4.5)
GLUCOSE SERPL-MCNC: 136 MG/DL (ref 65–100)
HCT VFR BLD AUTO: 36.1 % (ref 41.1–50.3)
HGB BLD-MCNC: 11.9 G/DL (ref 13.6–17.2)
IMM GRANULOCYTES # BLD AUTO: 0 K/UL (ref 0–0.5)
IMM GRANULOCYTES NFR BLD AUTO: 1 % (ref 0–5)
LACTATE SERPL-SCNC: 1.2 MMOL/L (ref 0.4–2)
LIPASE SERPL-CCNC: 31 U/L (ref 73–393)
LYMPHOCYTES # BLD: 0.8 K/UL (ref 0.5–4.6)
LYMPHOCYTES NFR BLD: 12 % (ref 13–44)
MAGNESIUM SERPL-MCNC: 2.4 MG/DL (ref 1.8–2.4)
MCH RBC QN AUTO: 32.8 PG (ref 26.1–32.9)
MCHC RBC AUTO-ENTMCNC: 33 G/DL (ref 31.4–35)
MCV RBC AUTO: 99.4 FL (ref 82–102)
MONOCYTES # BLD: 0.6 K/UL (ref 0.1–1.3)
MONOCYTES NFR BLD: 9 % (ref 4–12)
NEUTS SEG # BLD: 5.3 K/UL (ref 1.7–8.2)
NEUTS SEG NFR BLD: 74 % (ref 43–78)
NRBC # BLD: 0 K/UL (ref 0–0.2)
PLATELET # BLD AUTO: 222 K/UL (ref 150–450)
PMV BLD AUTO: 8.7 FL (ref 9.4–12.3)
POTASSIUM SERPL-SCNC: 4 MMOL/L (ref 3.5–5.1)
PROCALCITONIN SERPL-MCNC: <0.05 NG/ML (ref 0–0.49)
PROT SERPL-MCNC: 6.9 G/DL (ref 6.3–8.2)
RBC # BLD AUTO: 3.63 M/UL (ref 4.23–5.6)
SODIUM SERPL-SCNC: 135 MMOL/L (ref 133–143)
TROPONIN I SERPL HS-MCNC: 10.8 PG/ML (ref 0–14)
WBC # BLD AUTO: 7.1 K/UL (ref 4.3–11.1)

## 2022-10-20 PROCEDURE — 6360000002 HC RX W HCPCS: Performed by: EMERGENCY MEDICINE

## 2022-10-20 PROCEDURE — 6360000004 HC RX CONTRAST MEDICATION: Performed by: EMERGENCY MEDICINE

## 2022-10-20 PROCEDURE — 80053 COMPREHEN METABOLIC PANEL: CPT

## 2022-10-20 PROCEDURE — 71045 X-RAY EXAM CHEST 1 VIEW: CPT

## 2022-10-20 PROCEDURE — 83690 ASSAY OF LIPASE: CPT

## 2022-10-20 PROCEDURE — 93005 ELECTROCARDIOGRAM TRACING: CPT | Performed by: EMERGENCY MEDICINE

## 2022-10-20 PROCEDURE — 83735 ASSAY OF MAGNESIUM: CPT

## 2022-10-20 PROCEDURE — 83605 ASSAY OF LACTIC ACID: CPT

## 2022-10-20 PROCEDURE — 87040 BLOOD CULTURE FOR BACTERIA: CPT

## 2022-10-20 PROCEDURE — 71260 CT THORAX DX C+: CPT | Performed by: EMERGENCY MEDICINE

## 2022-10-20 PROCEDURE — 84484 ASSAY OF TROPONIN QUANT: CPT

## 2022-10-20 PROCEDURE — 96374 THER/PROPH/DIAG INJ IV PUSH: CPT

## 2022-10-20 PROCEDURE — 94762 N-INVAS EAR/PLS OXIMTRY CONT: CPT

## 2022-10-20 PROCEDURE — 85025 COMPLETE CBC W/AUTO DIFF WBC: CPT

## 2022-10-20 PROCEDURE — 84145 PROCALCITONIN (PCT): CPT

## 2022-10-20 PROCEDURE — 99285 EMERGENCY DEPT VISIT HI MDM: CPT

## 2022-10-20 PROCEDURE — 6370000000 HC RX 637 (ALT 250 FOR IP): Performed by: EMERGENCY MEDICINE

## 2022-10-20 PROCEDURE — 94640 AIRWAY INHALATION TREATMENT: CPT

## 2022-10-20 RX ORDER — IPRATROPIUM BROMIDE AND ALBUTEROL SULFATE 2.5; .5 MG/3ML; MG/3ML
1 SOLUTION RESPIRATORY (INHALATION)
Status: COMPLETED | OUTPATIENT
Start: 2022-10-20 | End: 2022-10-20

## 2022-10-20 RX ORDER — METHYLPREDNISOLONE SODIUM SUCCINATE 125 MG/2ML
125 INJECTION, POWDER, LYOPHILIZED, FOR SOLUTION INTRAMUSCULAR; INTRAVENOUS
Status: COMPLETED | OUTPATIENT
Start: 2022-10-20 | End: 2022-10-20

## 2022-10-20 RX ADMIN — IOPAMIDOL 100 ML: 755 INJECTION, SOLUTION INTRAVENOUS at 22:53

## 2022-10-20 RX ADMIN — IPRATROPIUM BROMIDE AND ALBUTEROL SULFATE 1 AMPULE: .5; 3 SOLUTION RESPIRATORY (INHALATION) at 21:14

## 2022-10-20 RX ADMIN — METHYLPREDNISOLONE SODIUM SUCCINATE 125 MG: 125 INJECTION, POWDER, FOR SOLUTION INTRAMUSCULAR; INTRAVENOUS at 21:54

## 2022-10-20 ASSESSMENT — ENCOUNTER SYMPTOMS
VOMITING: 0
COLOR CHANGE: 0
ABDOMINAL PAIN: 0
NAUSEA: 0
CONSTIPATION: 0
RHINORRHEA: 0
SPUTUM PRODUCTION: 0
SORE THROAT: 0
WHEEZING: 0
SHORTNESS OF BREATH: 1
DIARRHEA: 0
COUGH: 1
BACK PAIN: 0

## 2022-10-20 ASSESSMENT — PAIN DESCRIPTION - LOCATION: LOCATION: CHEST;NECK

## 2022-10-20 ASSESSMENT — PAIN DESCRIPTION - PAIN TYPE: TYPE: ACUTE PAIN

## 2022-10-20 ASSESSMENT — PAIN DESCRIPTION - DESCRIPTORS: DESCRIPTORS: ACHING;BURNING

## 2022-10-20 ASSESSMENT — PAIN - FUNCTIONAL ASSESSMENT: PAIN_FUNCTIONAL_ASSESSMENT: 0-10

## 2022-10-21 PROBLEM — M75.102 NONTRAUMATIC TEAR OF LEFT ROTATOR CUFF: Status: ACTIVE | Noted: 2022-09-21

## 2022-10-21 PROBLEM — I26.99 PULMONARY EMBOLISM ON RIGHT (HCC): Status: ACTIVE | Noted: 2022-10-21

## 2022-10-21 PROBLEM — J96.01 ACUTE HYPOXEMIC RESPIRATORY FAILURE (HCC): Status: ACTIVE | Noted: 2022-10-21

## 2022-10-21 PROBLEM — K21.9 GERD (GASTROESOPHAGEAL REFLUX DISEASE): Status: ACTIVE | Noted: 2020-05-12

## 2022-10-21 PROBLEM — C34.12 MALIGNANT NEOPLASM OF UPPER LOBE OF LEFT LUNG (HCC): Status: ACTIVE | Noted: 2022-04-13

## 2022-10-21 PROBLEM — I10 ESSENTIAL HYPERTENSION: Status: ACTIVE | Noted: 2020-05-12

## 2022-10-21 LAB
ANION GAP SERPL CALC-SCNC: 7 MMOL/L (ref 2–11)
BASOPHILS # BLD: 0 K/UL (ref 0–0.2)
BASOPHILS NFR BLD: 0 % (ref 0–2)
BUN SERPL-MCNC: 20 MG/DL (ref 8–23)
CALCIUM SERPL-MCNC: 9.5 MG/DL (ref 8.3–10.4)
CHLORIDE SERPL-SCNC: 99 MMOL/L (ref 101–110)
CO2 SERPL-SCNC: 28 MMOL/L (ref 21–32)
CREAT SERPL-MCNC: 0.95 MG/DL (ref 0.8–1.5)
DIFFERENTIAL METHOD BLD: ABNORMAL
EKG ATRIAL RATE: 102 BPM
EKG DIAGNOSIS: NORMAL
EKG P AXIS: 34 DEGREES
EKG P-R INTERVAL: 152 MS
EKG Q-T INTERVAL: 324 MS
EKG QRS DURATION: 78 MS
EKG QTC CALCULATION (BAZETT): 422 MS
EKG R AXIS: 3 DEGREES
EKG T AXIS: 22 DEGREES
EKG VENTRICULAR RATE: 102 BPM
EOSINOPHIL # BLD: 0 K/UL (ref 0–0.8)
EOSINOPHIL NFR BLD: 0 % (ref 0.5–7.8)
ERYTHROCYTE [DISTWIDTH] IN BLOOD BY AUTOMATED COUNT: 13.1 % (ref 11.9–14.6)
GLUCOSE SERPL-MCNC: 186 MG/DL (ref 65–100)
HCT VFR BLD AUTO: 35 % (ref 41.1–50.3)
HGB BLD-MCNC: 11.6 G/DL (ref 13.6–17.2)
IMM GRANULOCYTES # BLD AUTO: 0.1 K/UL (ref 0–0.5)
IMM GRANULOCYTES NFR BLD AUTO: 1 % (ref 0–5)
LYMPHOCYTES # BLD: 0.5 K/UL (ref 0.5–4.6)
LYMPHOCYTES NFR BLD: 7 % (ref 13–44)
MCH RBC QN AUTO: 32.9 PG (ref 26.1–32.9)
MCHC RBC AUTO-ENTMCNC: 33.1 G/DL (ref 31.4–35)
MCV RBC AUTO: 99.2 FL (ref 82–102)
MONOCYTES # BLD: 0.1 K/UL (ref 0.1–1.3)
MONOCYTES NFR BLD: 1 % (ref 4–12)
NEUTS SEG # BLD: 6.1 K/UL (ref 1.7–8.2)
NEUTS SEG NFR BLD: 91 % (ref 43–78)
NRBC # BLD: 0 K/UL (ref 0–0.2)
PLATELET # BLD AUTO: 216 K/UL (ref 150–450)
PMV BLD AUTO: 8.8 FL (ref 9.4–12.3)
POTASSIUM SERPL-SCNC: 4.2 MMOL/L (ref 3.5–5.1)
RBC # BLD AUTO: 3.53 M/UL (ref 4.23–5.6)
SODIUM SERPL-SCNC: 134 MMOL/L (ref 133–143)
TROPONIN I SERPL HS-MCNC: 9.2 PG/ML (ref 0–14)
UFH PPP CHRO-ACNC: 0.53 IU/ML (ref 0.3–0.7)
UFH PPP CHRO-ACNC: 0.89 IU/ML (ref 0.3–0.7)
UFH PPP CHRO-ACNC: >1.1 IU/ML (ref 0.3–0.7)
WBC # BLD AUTO: 6.7 K/UL (ref 4.3–11.1)

## 2022-10-21 PROCEDURE — 80048 BASIC METABOLIC PNL TOTAL CA: CPT

## 2022-10-21 PROCEDURE — 1100000000 HC RM PRIVATE

## 2022-10-21 PROCEDURE — 6370000000 HC RX 637 (ALT 250 FOR IP): Performed by: FAMILY MEDICINE

## 2022-10-21 PROCEDURE — 6360000002 HC RX W HCPCS: Performed by: EMERGENCY MEDICINE

## 2022-10-21 PROCEDURE — 2700000000 HC OXYGEN THERAPY PER DAY

## 2022-10-21 PROCEDURE — 2580000003 HC RX 258: Performed by: FAMILY MEDICINE

## 2022-10-21 PROCEDURE — 85520 HEPARIN ASSAY: CPT

## 2022-10-21 PROCEDURE — 85025 COMPLETE CBC W/AUTO DIFF WBC: CPT

## 2022-10-21 PROCEDURE — 94760 N-INVAS EAR/PLS OXIMETRY 1: CPT

## 2022-10-21 PROCEDURE — 6360000002 HC RX W HCPCS: Performed by: INTERNAL MEDICINE

## 2022-10-21 PROCEDURE — 36415 COLL VENOUS BLD VENIPUNCTURE: CPT

## 2022-10-21 RX ORDER — SODIUM CHLORIDE 9 MG/ML
INJECTION, SOLUTION INTRAVENOUS PRN
Status: DISCONTINUED | OUTPATIENT
Start: 2022-10-21 | End: 2022-10-22 | Stop reason: HOSPADM

## 2022-10-21 RX ORDER — MECLIZINE HYDROCHLORIDE 25 MG/1
25 TABLET ORAL 3 TIMES DAILY PRN
Status: DISCONTINUED | OUTPATIENT
Start: 2022-10-21 | End: 2022-10-22 | Stop reason: HOSPADM

## 2022-10-21 RX ORDER — CETIRIZINE HYDROCHLORIDE 10 MG/1
10 TABLET ORAL DAILY
Status: DISCONTINUED | OUTPATIENT
Start: 2022-10-21 | End: 2022-10-22 | Stop reason: HOSPADM

## 2022-10-21 RX ORDER — OXYCODONE AND ACETAMINOPHEN 10; 325 MG/1; MG/1
1 TABLET ORAL EVERY 6 HOURS PRN
Status: DISCONTINUED | OUTPATIENT
Start: 2022-10-21 | End: 2022-10-22 | Stop reason: HOSPADM

## 2022-10-21 RX ORDER — ASPIRIN 81 MG/1
81 TABLET, CHEWABLE ORAL DAILY
Status: DISCONTINUED | OUTPATIENT
Start: 2022-10-21 | End: 2022-10-22 | Stop reason: HOSPADM

## 2022-10-21 RX ORDER — ACETAMINOPHEN 325 MG/1
650 TABLET ORAL EVERY 6 HOURS PRN
Status: DISCONTINUED | OUTPATIENT
Start: 2022-10-21 | End: 2022-10-22 | Stop reason: HOSPADM

## 2022-10-21 RX ORDER — ATORVASTATIN CALCIUM 10 MG/1
10 TABLET, FILM COATED ORAL DAILY
Status: DISCONTINUED | OUTPATIENT
Start: 2022-10-21 | End: 2022-10-22 | Stop reason: HOSPADM

## 2022-10-21 RX ORDER — HEPARIN SODIUM 10000 [USP'U]/100ML
5-30 INJECTION, SOLUTION INTRAVENOUS CONTINUOUS
Status: DISCONTINUED | OUTPATIENT
Start: 2022-10-21 | End: 2022-10-22

## 2022-10-21 RX ORDER — ONDANSETRON 4 MG/1
4 TABLET, ORALLY DISINTEGRATING ORAL EVERY 8 HOURS PRN
Status: DISCONTINUED | OUTPATIENT
Start: 2022-10-21 | End: 2022-10-22 | Stop reason: HOSPADM

## 2022-10-21 RX ORDER — SODIUM CHLORIDE 0.9 % (FLUSH) 0.9 %
5-40 SYRINGE (ML) INJECTION EVERY 12 HOURS SCHEDULED
Status: DISCONTINUED | OUTPATIENT
Start: 2022-10-21 | End: 2022-10-22 | Stop reason: HOSPADM

## 2022-10-21 RX ORDER — ALLOPURINOL 100 MG/1
100 TABLET ORAL DAILY
Status: DISCONTINUED | OUTPATIENT
Start: 2022-10-21 | End: 2022-10-22 | Stop reason: HOSPADM

## 2022-10-21 RX ORDER — LOSARTAN POTASSIUM 50 MG/1
50 TABLET ORAL DAILY
Status: DISCONTINUED | OUTPATIENT
Start: 2022-10-21 | End: 2022-10-22 | Stop reason: HOSPADM

## 2022-10-21 RX ORDER — METHOCARBAMOL 750 MG/1
750 TABLET, FILM COATED ORAL 3 TIMES DAILY
Status: DISCONTINUED | OUTPATIENT
Start: 2022-10-21 | End: 2022-10-22 | Stop reason: HOSPADM

## 2022-10-21 RX ORDER — POLYETHYLENE GLYCOL 3350 17 G/17G
17 POWDER, FOR SOLUTION ORAL DAILY PRN
Status: DISCONTINUED | OUTPATIENT
Start: 2022-10-21 | End: 2022-10-22 | Stop reason: HOSPADM

## 2022-10-21 RX ORDER — OXYCODONE HYDROCHLORIDE AND ACETAMINOPHEN 5; 325 MG/1; MG/1
1 TABLET ORAL 3 TIMES DAILY PRN
COMMUNITY
Start: 2022-10-10

## 2022-10-21 RX ORDER — ACETAMINOPHEN 650 MG/1
650 SUPPOSITORY RECTAL EVERY 6 HOURS PRN
Status: DISCONTINUED | OUTPATIENT
Start: 2022-10-21 | End: 2022-10-22 | Stop reason: HOSPADM

## 2022-10-21 RX ORDER — PANTOPRAZOLE SODIUM 40 MG/1
40 TABLET, DELAYED RELEASE ORAL
Status: DISCONTINUED | OUTPATIENT
Start: 2022-10-21 | End: 2022-10-22 | Stop reason: HOSPADM

## 2022-10-21 RX ORDER — HEPARIN SODIUM 1000 [USP'U]/ML
80 INJECTION, SOLUTION INTRAVENOUS; SUBCUTANEOUS ONCE
Status: COMPLETED | OUTPATIENT
Start: 2022-10-21 | End: 2022-10-21

## 2022-10-21 RX ORDER — HEPARIN SODIUM 1000 [USP'U]/ML
40 INJECTION, SOLUTION INTRAVENOUS; SUBCUTANEOUS PRN
Status: DISCONTINUED | OUTPATIENT
Start: 2022-10-21 | End: 2022-10-22

## 2022-10-21 RX ORDER — SODIUM CHLORIDE 0.9 % (FLUSH) 0.9 %
5-40 SYRINGE (ML) INJECTION PRN
Status: DISCONTINUED | OUTPATIENT
Start: 2022-10-21 | End: 2022-10-22 | Stop reason: HOSPADM

## 2022-10-21 RX ORDER — HEPARIN SODIUM 1000 [USP'U]/ML
80 INJECTION, SOLUTION INTRAVENOUS; SUBCUTANEOUS PRN
Status: DISCONTINUED | OUTPATIENT
Start: 2022-10-21 | End: 2022-10-22

## 2022-10-21 RX ORDER — ONDANSETRON 2 MG/ML
4 INJECTION INTRAMUSCULAR; INTRAVENOUS EVERY 6 HOURS PRN
Status: DISCONTINUED | OUTPATIENT
Start: 2022-10-21 | End: 2022-10-22 | Stop reason: HOSPADM

## 2022-10-21 RX ADMIN — HEPARIN SODIUM 12.99 UNITS/KG/HR: 10000 INJECTION, SOLUTION INTRAVENOUS at 22:10

## 2022-10-21 RX ADMIN — SODIUM CHLORIDE, PRESERVATIVE FREE 10 ML: 5 INJECTION INTRAVENOUS at 20:31

## 2022-10-21 RX ADMIN — LOSARTAN POTASSIUM 50 MG: 50 TABLET, FILM COATED ORAL at 09:11

## 2022-10-21 RX ADMIN — HEPARIN SODIUM 18 UNITS/KG/HR: 10000 INJECTION, SOLUTION INTRAVENOUS at 00:57

## 2022-10-21 RX ADMIN — OXYCODONE AND ACETAMINOPHEN 1 TABLET: 325; 10 TABLET ORAL at 13:58

## 2022-10-21 RX ADMIN — METHOCARBAMOL 750 MG: 750 TABLET ORAL at 20:31

## 2022-10-21 RX ADMIN — PANTOPRAZOLE SODIUM 40 MG: 40 TABLET, DELAYED RELEASE ORAL at 06:05

## 2022-10-21 RX ADMIN — HEPARIN SODIUM 6530 UNITS: 1000 INJECTION INTRAVENOUS; SUBCUTANEOUS at 01:00

## 2022-10-21 RX ADMIN — ALLOPURINOL 100 MG: 100 TABLET ORAL at 09:12

## 2022-10-21 RX ADMIN — CETIRIZINE HYDROCHLORIDE 10 MG: 10 TABLET ORAL at 09:09

## 2022-10-21 RX ADMIN — ATORVASTATIN CALCIUM 10 MG: 10 TABLET, FILM COATED ORAL at 09:11

## 2022-10-21 RX ADMIN — ASPIRIN 81 MG: 81 TABLET, CHEWABLE ORAL at 09:09

## 2022-10-21 ASSESSMENT — PAIN DESCRIPTION - PAIN TYPE: TYPE: CHRONIC PAIN

## 2022-10-21 ASSESSMENT — PAIN - FUNCTIONAL ASSESSMENT: PAIN_FUNCTIONAL_ASSESSMENT: ACTIVITIES ARE NOT PREVENTED

## 2022-10-21 ASSESSMENT — PAIN SCALES - GENERAL
PAINLEVEL_OUTOF10: 4
PAINLEVEL_OUTOF10: 0

## 2022-10-21 ASSESSMENT — PAIN DESCRIPTION - ORIENTATION: ORIENTATION: LEFT

## 2022-10-21 ASSESSMENT — PAIN DESCRIPTION - DESCRIPTORS: DESCRIPTORS: ACHING

## 2022-10-21 ASSESSMENT — PAIN DESCRIPTION - LOCATION: LOCATION: SHOULDER

## 2022-10-21 NOTE — FLOWSHEET NOTE
10/21/22 1609   Dual Clinician Skin Assessment   Dual Skin Assessment (4 Eyes) WDL   Second Clinical  (First and Last Name) Kyler Teran RN   Skin Integumentary    Skin Integumentary (WDL) WDL

## 2022-10-21 NOTE — PROGRESS NOTES
TRANSFER - IN REPORT:    Verbal report received from Zora on Milton Zamarripa  being received from ICU for routine progression of patient care      Report consisted of patient's Situation, Background, Assessment and   Recommendations(SBAR). Information from the following report(s) Nurse Handoff Report was reviewed with the receiving nurse. Opportunity for questions and clarification was provided. Assessment completed upon patient's arrival to unit and care assumed.

## 2022-10-21 NOTE — ED NOTES
TRANSFER - OUT REPORT:    Verbal report given to Corinne, RN on Kristin Schmitz  being transferred to  for routine progression of patient care       Report consisted of patient's Situation, Background, Assessment and   Recommendations(SBAR). Information from the following report(s) Nurse Handoff Report was reviewed with the receiving nurse. Lines:   Peripheral IV 10/20/22 Right Forearm (Active)        Opportunity for questions and clarification was provided.       Patient transported with:  Monitor   O2 4LNC  Pt Belongings     Kory Mendosa, [address] [place] [address]  10/21/22 1655

## 2022-10-21 NOTE — ASSESSMENT & PLAN NOTE
- Heparin drip initiated in ER  - Discussed the issue of cancer causing hypercoagulability with patient and wife

## 2022-10-21 NOTE — PROGRESS NOTES
Heparin gtt     Initiated heparin gtt at  18 units/kg/hr. 6,530 unit bolus administered. Initial Anti-Xa ordered for 0700 .

## 2022-10-21 NOTE — ED PROVIDER NOTES
Emergency Department Provider Note                   PCP:                KALIE Valentine NP               Age: 81 y.o. Sex: male       ICD-10-CM    1. Acute pulmonary embolism without acute cor pulmonale, unspecified pulmonary embolism type (HCC)  I26.99       2. Hypoxia  R09.02           DISPOSITION Decision To Admit 10/21/2022 12:22:00 AM       MDM  Number of Diagnoses or Management Options  Acute pulmonary embolism without acute cor pulmonale, unspecified pulmonary embolism type ([address]  Hypoxia  Diagnosis management comments: Patient with history of lung cancer treated with radiation. Doing well. Having shortness of breath and mild chest pain today. PE present on CT. Heparin started and will admit. Patient hypoxic 86% of oxygen. Improved with oxygen.        Amount and/or Complexity of Data Reviewed  Clinical lab tests: ordered and reviewed  Tests in the radiology section of CPT®: ordered and reviewed  Tests in the medicine section of CPT®: ordered and reviewed    Patient Progress  Patient progress: stable             Orders Placed This Encounter   Procedures    Culture, Blood 1    Culture, Blood 1    XR CHEST PORTABLE    CT CHEST PULMONARY EMBOLISM W CONTRAST    CBC with Auto Differential    Comprehensive Metabolic Panel    Magnesium    Troponin    Lipase    Lactic Acid    Procalcitonin    Troponin    Protime-INR    APTT    CBC    Anti-Xa, Unfractionated Heparin    Continuous Pulse Oximetry    Cardiac Monitor - ED Only    EKG 12 Lead    Saline lock IV        Medications   heparin (porcine) injection 6,530 Units (has no administration in time range)   heparin (porcine) injection 6,530 Units (has no administration in time range)   heparin (porcine) injection 3,260 Units (has no administration in time range)   heparin 25,000 units in dextrose 5% 250 mL (premix) infusion (has no administration in time range)   ipratropium-albuterol (DUONEB) nebulizer solution 1 ampule (1 ampule Inhalation Given 10/20/22 2114)   methylPREDNISolone sodium (SOLU-MEDROL) injection 125 mg (125 mg IntraVENous Given 10/20/22 2154)       New Prescriptions    No medications on file        Kristin Schmitz is a 81 y.o. 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 Has follow-up appointment with oncology after last treatment and has been doing well. He has had some increased shortness of breath and some left lower chest pain starting yesterday. Has been intermittent. Current episode of pain has been for 4 hours. Pressure type pain. No nausea numbness or diaphoresis. The history is provided by the patient. No  was used. Shortness of Breath  Severity:  Mild  Duration:  2 days  Timing:  Constant  Progression:  Unchanged  Chronicity:  New  Relieved by:  Nothing  Worsened by:  Nothing  Associated symptoms: chest pain (intermittent) and cough    Associated symptoms: no abdominal pain, no diaphoresis, no fever, no headaches, no neck pain, no rash, no sore throat, no sputum production, no vomiting and no wheezing      All other systems reviewed and are negative unless otherwise stated in the history of present illness section. Review of Systems   Constitutional:  Negative for chills, diaphoresis and fever. HENT:  Negative for congestion, rhinorrhea and sore throat. Respiratory:  Positive for cough and shortness of breath. Negative for sputum production and wheezing. Cardiovascular:  Positive for chest pain (intermittent). Negative for palpitations. Gastrointestinal:  Negative for abdominal pain, constipation, diarrhea, nausea and vomiting. Genitourinary:  Negative for dysuria and hematuria. Musculoskeletal:  Negative for back pain and neck pain. Skin:  Negative for color change and rash.    Neurological:  Negative for numbness and headaches. All other systems reviewed and are negative. Past Medical History:   Diagnosis Date    Hypercholesterolemia     Hypertension     Vertigo     Vertigo        Past Surgical History:   Procedure Laterality Date    APPENDECTOMY      GI  1964    Colon resection    NEUROLOGICAL SURGERY  1988    neck        Family History   Problem Relation Age of Onset    Hypertension Father     Hypertension Mother     Osteoarthritis Mother     Stroke Father     Lupus Mother         Social History     Socioeconomic History    Marital status:      Spouse name: None    Number of children: None    Years of education: None    Highest education level: None   Tobacco Use    Smoking status: Never    Smokeless tobacco: Never   Substance and Sexual Activity    Alcohol use: No    Drug use: No        Allergies: Atorvastatin, Bupropion, Clonidine, Doxazosin, Hydrochlorothiazide, Lisinopril, Metoprolol, and Simvastatin    Previous Medications    ALLOPURINOL (ZYLOPRIM) 300 MG TABLET    Take 100 mg by mouth daily    ASPIRIN 81 MG CHEWABLE TABLET    Take 81 mg by mouth daily    ATORVASTATIN (LIPITOR) 20 MG TABLET    Take 10 mg by mouth daily    CETIRIZINE HCL (ZYRTEC ALLERGY) 10 MG CAPS    Take by mouth    LIDOCAINE (LMX) 4 % CREAM    Apply topically    LOSARTAN (COZAAR) 50 MG TABLET    Take by mouth daily    MECLIZINE (ANTIVERT) 25 MG TABLET    Take 25 mg by mouth 3 times daily as needed    MENTHOL-METHYL SALICYLATE (THERA-GESIC) 0.5-15 % CREA    Apply topically 3 times daily    METHOCARBAMOL (ROBAXIN) 750 MG TABLET    Take by mouth 3 times daily    METRONIDAZOLE (METROCREAM) 0.75 % CREAM    Apply topically 2 times daily    NAPROXEN (NAPROSYN) 375 MG TABLET    Take 375 mg by mouth 2 times daily (with meals)    OMEPRAZOLE (PRILOSEC) 20 MG DELAYED RELEASE CAPSULE    Take 20 mg by mouth daily    OXYCODONE-ACETAMINOPHEN (PERCOCET)  MG PER TABLET    Take 1 tablet by mouth every 6 hours as needed for Pain for up to 30 days. Intended supply: 30 days    TRIAMCINOLONE (KENALOG) 0.025 % CREAM    Apply topically 2 times daily        Vitals signs and nursing note reviewed. Patient Vitals for the past 4 hrs:   Temp Pulse Resp BP SpO2   10/21/22 0000 -- 75 -- 135/80 92 %   10/20/22 2115 -- 85 18 -- 96 %   10/20/22 2037 98.9 °F (37.2 °C) 99 18 121/73 (!) 86 %          Physical Exam  Vitals and nursing note reviewed. Constitutional:       Appearance: Normal appearance. He is well-developed. HENT:      Head: Normocephalic and atraumatic. Cardiovascular:      Rate and Rhythm: Normal rate and regular rhythm. Pulmonary:      Effort: Respiratory distress (mild increased effort) present. Breath sounds: Normal breath sounds. No wheezing. Abdominal:      General: Bowel sounds are normal.      Palpations: Abdomen is soft. Tenderness: There is no abdominal tenderness. Musculoskeletal:         General: No swelling. Normal range of motion. Cervical back: Normal range of motion. No tenderness. Skin:     General: Skin is warm and dry. Neurological:      Mental Status: He is alert. Procedures    ED EKG Interpretation  EKG was interpreted in the absence of a cardiologist.    Rate: 102  EKG Interpretation: EKG Interpretation: sinus rhythm  ST Segments: Nonspecific ST segments - NO STEMI    Results for orders placed or performed during the hospital encounter of 10/20/22   XR CHEST PORTABLE    Narrative    Chest X-ray    INDICATION: Chest pain since yesterday    AP/PA view of the chest was obtained. FINDINGS: There is increased left perihilar infiltrate/atelectasis. Right lung  remains clear. Stable cardiomegaly. There is vascular calcification. Impression    Increased left perihilar infiltrate/atelectasis     CT CHEST PULMONARY EMBOLISM W CONTRAST    Narrative    Clinical history: Chest pain.  Hypoxia    TECHNIQUE: Axial, coronal and sagittal CT of the chest with IV contrast.  Radiation dose reduction techniques were used for this study:  Our CT scanners  use one or all of the following: Automated exposure control, adjustment of the  mA and/or kVp according to patient's size, iterative reconstruction. Comparison: May 2022 CT and chest x-ray earlier today    FINDINGS:    There is pulmonary embolus in the right upper lobe pulmonary artery, extending  into the segmental pulmonary arteries. There is pulmonary embolus in the right  interlobar pulmonary artery, extending into the lower lobe segmental and  subsegmental pulmonary arteries. No left-sided pulmonary emboli are seen. The heart is enlarged. There is no pericardial effusion. No evidence of right  heart strain. There is mild coronary artery calcification. The thoracic aorta is  normal in course and caliber with atherosclerotic calcification. No evidence of  lymphadenopathy. There are bilateral areas of groundglass densities, likely due to atelectasis or  air trapping. Platelike density in the lingula with associated 13 mm nodular  component. The nodular component measured up to 3.5 cm on the prior study. There is no endobronchial lesion. There is no pneumothorax or pleural effusion. Included upper abdomen demonstrates micronodular contour of the liver. There is  moderate hiatal hernia. Stable right adrenal nodule. There are degenerative  changes of the spine. Impression    1. Moderate volume right-sided pulmonary emboli. No evidence of pulmonary  infarct of right heart strain. Telephoned to ED physician, Dr. Camille Chavis, at 12:16 AM  on 10/21/2022.    2. Scarring/platelike atelectasis in the lingula with associated 13 mm nodular  component. The nodular component measured 3.5 cm on the prior may 2022 study. 3. Bilateral groundglass densities, likely related to atelectasis and air  trapping. No evidence of pneumonia or pleural effusion. 4. Cardiomegaly. 5. Stable right adrenal nodule. 6. Stable hiatal hernia.         DC5     CBC with Auto Differential   Result Value Ref Range    WBC 7.1 4.3 - 11.1 K/uL    RBC 3.63 (L) 4.23 - 5.6 M/uL    Hemoglobin 11.9 (L) 13.6 - 17.2 g/dL    Hematocrit 36.1 (L) 41.1 - 50.3 %    MCV 99.4 82.0 - 102.0 FL    MCH 32.8 26.1 - 32.9 PG    MCHC 33.0 31.4 - 35.0 g/dL    RDW 13.0 11.9 - 14.6 %    Platelets 944 855 - 918 K/uL    MPV 8.7 (L) 9.4 - 12.3 FL    nRBC 0.00 0.0 - 0.2 K/uL    Differential Type AUTOMATED      Seg Neutrophils 74 43 - 78 %    Lymphocytes 12 (L) 13 - 44 %    Monocytes 9 4.0 - 12.0 %    Eosinophils % 5 0.5 - 7.8 %    Basophils 1 0.0 - 2.0 %    Immature Granulocytes 1 0.0 - 5.0 %    Segs Absolute 5.3 1.7 - 8.2 K/UL    Absolute Lymph # 0.8 0.5 - 4.6 K/UL    Absolute Mono # 0.6 0.1 - 1.3 K/UL    Absolute Eos # 0.3 0.0 - 0.8 K/UL    Basophils Absolute 0.1 0.0 - 0.2 K/UL    Absolute Immature Granulocyte 0.0 0.0 - 0.5 K/UL   Comprehensive Metabolic Panel   Result Value Ref Range    Sodium 135 133 - 143 mmol/L    Potassium 4.0 3.5 - 5.1 mmol/L    Chloride 100 (L) 101 - 110 mmol/L    CO2 29 21 - 32 mmol/L    Anion Gap 6 2 - 11 mmol/L    Glucose 136 (H) 65 - 100 mg/dL    BUN 22 8 - 23 MG/DL    Creatinine 1.08 0.8 - 1.5 MG/DL    Est, Glom Filt Rate >60 >60 ml/min/1.73m2    Calcium 9.4 8.3 - 10.4 MG/DL    Total Bilirubin 0.4 0.2 - 1.1 MG/DL    ALT 32 12 - 65 U/L    AST 27 15 - 37 U/L    Alk Phosphatase 81 50 - 136 U/L    Total Protein 6.9 6.3 - 8.2 g/dL    Albumin 2.9 (L) 3.2 - 4.6 g/dL    Globulin 4.0 2.8 - 4.5 g/dL    Albumin/Globulin Ratio 0.7 0.4 - 1.6     Magnesium   Result Value Ref Range    Magnesium 2.4 1.8 - 2.4 mg/dL   Troponin   Result Value Ref Range    Troponin, High Sensitivity 10.8 0 - 14 pg/mL   Lipase   Result Value Ref Range    Lipase 31 (L) 73 - 393 U/L   Lactic Acid   Result Value Ref Range    Lactic Acid, Plasma 1.2 0.4 - 2.0 MMOL/L   Procalcitonin   Result Value Ref Range    Procalcitonin <0.05 0.00 - 0.49 ng/mL   Troponin   Result Value Ref Range    Troponin, High Sensitivity 9.2 0 - 14 pg/mL EKG 12 Lead   Result Value Ref Range    Ventricular Rate 102 BPM    Atrial Rate 102 BPM    P-R Interval 152 ms    QRS Duration 78 ms    Q-T Interval 324 ms    QTc Calculation (Bazett) 422 ms    P Axis 34 degrees    R Axis 3 degrees    T Axis 22 degrees    Diagnosis Sinus tachycardia with Premature atrial complexes         CT CHEST PULMONARY EMBOLISM W CONTRAST   Final Result      1. Moderate volume right-sided pulmonary emboli. No evidence of pulmonary   infarct of right heart strain. Telephoned to ED physician, Dr. Isa Panchal, at 12:16 AM   on 10/21/2022.      2. Scarring/platelike atelectasis in the lingula with associated 13 mm nodular   component. The nodular component measured 3.5 cm on the prior may 2022 study. 3. Bilateral groundglass densities, likely related to atelectasis and air   trapping. No evidence of pneumonia or pleural effusion. 4. Cardiomegaly. 5. Stable right adrenal nodule. 6. Stable hiatal hernia. DC5         XR CHEST PORTABLE   Final Result   Increased left perihilar infiltrate/atelectasis                               Voice dictation software was used during the making of this note. This software is not perfect and grammatical and other typographical errors may be present. This note has not been completely proofread for errors.      Kareem Martinez III, MD  10/21/22 7572

## 2022-10-21 NOTE — CARE COORDINATION
10/21/22 0920   Service Assessment   Patient Orientation Alert and Oriented   Cognition Alert   History Provided By Patient   Primary Caregiver Self   Support Systems Spouse/Significant Other   PCP Verified by CM Yes  ([facility])   Prior Functional Level Independent in ADLs/IADLs   Current Functional Level Independent in ADLs/IADLs   Can patient return to prior living arrangement Yes   Ability to make needs known: Good   Family able to assist with home care needs: Yes   Would you like for me to discuss the discharge plan with any other family members/significant others, and if so, who? Yes  (spouse and daughter Mateus Pierre [phone])   Financial Resources Medicare   Community Resources Other (Comment)  ([facility])   Social/Functional History   Lives With Spouse   Type of [address] [place] [address]   (None)   Home Equipment   (None)   ADL Assistance Independent   Mode of Transportation Car  (family will transport him home at discharge.)   Discharge Planning   Type of [facility] Spouse/Significant Other   Current Services Prior To Admission [facility]   Potential DME Needed Oxygen Therapy (Comment)   [address] [facility]   Patient expects to be discharged to: House     RN CM met with the patient, his daughter, and spouse at the bedside and discussed discharge planning. He lives with his spouse and stated he is independent of ADLs and does not use external services. He owns a rolling walker. He is currently requiring oxygen. The patient was provided with coupons for Eliquis. He has a PCP at the [facility]. RN CM encouraged them to ask questions. Case Management will continue to follow him for discharge planning needs.

## 2022-10-21 NOTE — PROGRESS NOTES
Patient admitted from ER to room 371. Dual skin assessment completed with Bandar Manzanares RN. No impairment noted    Patient oriented to unit policies and visitation guidelines. Family provided with security code upon patient's request. Bed low, locked with alarm on. Call light and overbed table in reach, patient oriented to call light use.

## 2022-10-21 NOTE — CONSULTS
INITIAL CONSULTATION--No charge to patient    EMR and CT scans (10/20/22, 5/16/22) reviewed. Small to Moderate Right Sided PE. No right heart strain. Left lung mass is smaller (suspect response to XRT?). Given the relatively small clot burden, and lack of right heart strain, I recommend no intervention at this time. Agree w anticoagulation. Consider Lower Extremity venous duplex US to look for residual DVT. Please call w any questions. Mercedes Flynn MD                  Past Medical History Complicated by  Past Medical History:   Diagnosis Date    Hypercholesterolemia     Hypertension     Vertigo     Vertigo   Lung cancer  GERD    Past Surgical History  Past Surgical History:   Procedure Laterality Date    APPENDECTOMY      GI  1964    Colon resection    NEUROLOGICAL SURGERY  1988    neck     Allergies  Allergies   Allergen Reactions    Amlodipine Other (See Comments)     Other reaction(s):  Other- (not listed) - Allergy  Shot bp up      Atorvastatin Other (See Comments)    Bupropion Other (See Comments)    Clonidine Other (See Comments)    Doxazosin Other (See Comments)    Hydrochlorothiazide Other (See Comments)    Lisinopril Other (See Comments)    Metoprolol Other (See Comments)    Simvastatin Other (See Comments)       Current Medications  Current Facility-Administered Medications   Medication Dose Route Frequency Provider Last Rate Last Admin    heparin (porcine) injection 6,530 Units  80 Units/kg IntraVENous PRN Lisa Taylor III, MD        heparin (porcine) injection 3,260 Units  40 Units/kg IntraVENous PRN Lisa Taylor III, MD        heparin 25,000 units in dextrose 5% 250 mL (premix) infusion  5-30 Units/kg/hr IntraVENous Continuous Lisa Taylor III, MD 14.7 mL/hr at 10/21/22 0057 18 Units/kg/hr at 10/21/22 0057    allopurinol (ZYLOPRIM) tablet 100 mg  100 mg Oral Daily Kranthi Bell MD        aspirin chewable tablet 81 mg  81 mg Oral Daily Kranthi Bell MD atorvastatin (LIPITOR) tablet 10 mg  10 mg Oral Daily Yajaira Villafana MD        cetirizine (ZYRTEC) tablet 10 mg  10 mg Oral Daily Yajaira Villafana MD        losartan (COZAAR) tablet 50 mg  50 mg Oral Daily Yajaira Villafana MD        meclizine (ANTIVERT) tablet 25 mg  25 mg Oral TID PRN Yajaira Villafana MD        methocarbamol (ROBAXIN) tablet 750 mg  750 mg Oral TID Yajaira Villafana MD        oxyCODONE-acetaminophen (PERCOCET)  MG per tablet 1 tablet  1 tablet Oral Q6H PRN Yajaira Villafana MD        pantoprazole (PROTONIX) tablet 40 mg  40 mg Oral QAM AC Yajaira Villafana MD   40 mg at 10/21/22 5928    sodium chloride flush 0.9 % injection 5-40 mL  5-40 mL IntraVENous 2 times per day Yajaira Villafana MD        sodium chloride flush 0.9 % injection 5-40 mL  5-40 mL IntraVENous PRN Yajaira Villafana MD        0.9 % sodium chloride infusion   IntraVENous PRN Yajaira Villafana MD        ondansetron (ZOFRAN-ODT) disintegrating tablet 4 mg  4 mg Oral Q8H PRN Yajaira Villafana MD        Or    ondansetron [facility]) injection 4 mg  4 mg IntraVENous Q6H PRN Yajaira Villafana MD        polyethylene glycol (GLYCOLAX) packet 17 g  17 g Oral Daily PRN Yajaira Villafana MD        acetaminophen (TYLENOL) tablet 650 mg  650 mg Oral Q6H PRN Yajaira Villafana MD        Or    acetaminophen (TYLENOL) suppository 650 mg  650 mg Rectal Q6H PRN Yajaira Villafana MD

## 2022-10-21 NOTE — H&P
Hospitalist History and Physical   Admit Date:  10/20/2022  8:38 PM   Name:  Vincent Hines   Age:  81 y.o. Sex:  male  :  1935   MRN:  [id number]     Presenting Complaint: SOB  Reason(s) for Admission: Hypoxia [R09.02]  Pulmonary embolism on right [facility]) [I26.99]  Acute pulmonary embolism without acute cor pulmonale, unspecified pulmonary embolism type ([address] [I26.99]     History of Present Illness:   Vincent Hines is a 81 y.o. male with medical history of lung cancer who presented to ED with dyspnea/SOB/chest pain. Patient reports symptoms started Wednesday. Reports associated mild fever as well. He states that he has not had issues with dyspnea, despite cancer diagnosis. Upon ER evaluation, patient found to be hypoxic to 86% on RA. He was started on 2lpm supplemental O2 with improvement of hypoxia. Lab work was overall unremarkable. However CT chest with contrast shows:  1. Moderate volume right-sided pulmonary emboli. No evidence of pulmonary   infarct of right heart strain. Telephoned to ED physician, Dr. Ana Mccoy, at 12:16 AM   on 10/21/2022.       2. Scarring/platelike atelectasis in the lingula with associated 13 mm nodular   component. The nodular component measured 3.5 cm on the prior may 2022 study. 3. Bilateral groundglass densities, likely related to atelectasis and air   trapping. No evidence of pneumonia or pleural effusion. 4. Cardiomegaly. 5. Stable right adrenal nodule. 6. Stable hiatal hernia. Patient started on heparin drip. Hospitalist asked to admit. Review of Systems:  10 systems reviewed and negative except as noted in HPI.   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 Past Medical History:   Diagnosis Date    Hypercholesterolemia     Hypertension     Vertigo     Vertigo     Past surgical history reviewed. Past Surgical History:   Procedure Laterality Date    APPENDECTOMY      GI  1964    Colon resection    NEUROLOGICAL SURGERY  1988    neck      Allergies   Allergen Reactions    Amlodipine Other (See Comments)     Other reaction(s): Other- (not listed) - Allergy  Shot bp up      Atorvastatin Other (See Comments)    Bupropion Other (See Comments)    Clonidine Other (See Comments)    Doxazosin Other (See Comments)    Hydrochlorothiazide Other (See Comments)    Lisinopril Other (See Comments)    Metoprolol Other (See Comments)    Simvastatin Other (See Comments)      Social History     Tobacco Use    Smoking status: Never    Smokeless tobacco: Never   Substance Use Topics    Alcohol use: No      Family History   Problem Relation Age of Onset    Hypertension Father     Hypertension Mother     Osteoarthritis Mother     Stroke Father     Lupus Mother       Family history reviewed and noncontributory to patient's acute condition; no relevant family history unless otherwise noted above. There is no immunization history on file for this patient.   PTA Medications:  Current Outpatient Medications   Medication Instructions    allopurinol (ZYLOPRIM) 100 mg, Oral, DAILY    aspirin 81 mg, Oral, DAILY    atorvastatin (LIPITOR) 10 mg, Oral, DAILY    Cetirizine HCl (ZYRTEC ALLERGY) 10 MG CAPS Oral    lidocaine (LMX) 4 % cream Apply topically    losartan (COZAAR) 50 MG tablet Oral, DAILY    meclizine (ANTIVERT) 25 mg, Oral, 3 TIMES DAILY PRN    Menthol-Methyl Salicylate (THERA-GESIC) 0.5-15 % CREA 3 TIMES DAILY    methocarbamol (ROBAXIN) 750 MG tablet 3 TIMES DAILY    metroNIDAZOLE (METROCREAM) 0.75 % cream 2 TIMES DAILY    naproxen (NAPROSYN) 375 mg, 2 TIMES DAILY WITH MEALS    omeprazole (PRILOSEC) 20 mg, Oral, DAILY    oxyCODONE-acetaminophen (PERCOCET) 5-325 MG per tablet 1 tablet, Oral, 3 TIMES DAILY PRN    triamcinolone (KENALOG) 0.025 % cream 2 TIMES DAILY       Objective:   Patient Vitals for the past 24 hrs:   Temp Pulse Resp BP SpO2   10/21/22 0500 -- 67 13 99/61 96 %   10/21/22 0400 -- 85 23 132/74 94 %   10/21/22 0352 -- 80 16 -- 94 %   10/21/22 0324 97.2 °F (36.2 °C) 73 14 120/82 95 %   10/21/22 0200 -- 81 16 113/68 94 %   10/21/22 0106 97.7 °F (36.5 °C) 99 15 (!) 159/92 90 %   10/21/22 0000 -- 75 -- 135/80 92 %   10/20/22 2115 -- 85 18 -- 96 %   10/20/22 2037 98.9 °F (37.2 °C) 99 18 121/73 (!) 86 %          Estimated body mass index is 28.07 kg/m² as calculated from the following:    Height as of this encounter: 5' 6\" (1.676 m). Weight as of this encounter: 173 lb 14.4 oz (78.9 kg). Intake/Output Summary (Last 24 hours) at 10/21/2022 4165  Last data filed at 10/21/2022 0606  Gross per 24 hour   Intake 89.91 ml   Output 500 ml   Net -410.09 ml         Physical Exam:  General:    Well nourished. No overt distress  Head:  Normocephalic, atraumatic  Eyes:  Sclerae appear normal.  Pupils equally round. HENT:  Nares appear normal, no drainage. Moist mucous membranes  Neck:  No restricted ROM. Trachea midline  CV:   RRR. S1/S2 auscultated  Lungs:   CTAB. No wheezing, rhonchi, or rales. Appears even, unlabored  Abdomen: Bowel sounds present. Soft, nontender, nondistended. Extremities: Warm and dry. No cyanosis or clubbing. No edema. Skin:     No rashes. Normal turgor. Normal coloration  Neuro:  Cranial nerves II-XII grossly intact. Sensation intact  Psych:  Normal mood and affect.   Alert and oriented x3    Data Ordered and Personally Reviewed:    Last 24hr Labs:  Recent Results (from the past 24 hour(s))   EKG 12 Lead    Collection Time: 10/20/22  8:28 PM   Result Value Ref Range    Ventricular Rate 102 BPM    Atrial Rate 102 BPM    P-R Interval 152 ms    QRS Duration 78 ms    Q-T Interval 324 ms    QTc Calculation (Bazett) 422 ms    P Axis 34 degrees    R Axis 3 degrees    T Axis 22 degrees    Diagnosis Sinus tachycardia with Premature atrial complexes    CBC with Auto Differential    Collection Time: 10/20/22  8:50 PM   Result Value Ref Range    WBC 7.1 4.3 - 11.1 K/uL    RBC 3.63 (L) 4.23 - 5.6 M/uL    Hemoglobin 11.9 (L) 13.6 - 17.2 g/dL    Hematocrit 36.1 (L) 41.1 - 50.3 %    MCV 99.4 82.0 - 102.0 FL    MCH 32.8 26.1 - 32.9 PG    MCHC 33.0 31.4 - 35.0 g/dL    RDW 13.0 11.9 - 14.6 %    Platelets 332 278 - 254 K/uL    MPV 8.7 (L) 9.4 - 12.3 FL    nRBC 0.00 0.0 - 0.2 K/uL    Differential Type AUTOMATED      Seg Neutrophils 74 43 - 78 %    Lymphocytes 12 (L) 13 - 44 %    Monocytes 9 4.0 - 12.0 %    Eosinophils % 5 0.5 - 7.8 %    Basophils 1 0.0 - 2.0 %    Immature Granulocytes 1 0.0 - 5.0 %    Segs Absolute 5.3 1.7 - 8.2 K/UL    Absolute Lymph # 0.8 0.5 - 4.6 K/UL    Absolute Mono # 0.6 0.1 - 1.3 K/UL    Absolute Eos # 0.3 0.0 - 0.8 K/UL    Basophils Absolute 0.1 0.0 - 0.2 K/UL    Absolute Immature Granulocyte 0.0 0.0 - 0.5 K/UL   Comprehensive Metabolic Panel    Collection Time: 10/20/22  8:50 PM   Result Value Ref Range    Sodium 135 133 - 143 mmol/L    Potassium 4.0 3.5 - 5.1 mmol/L    Chloride 100 (L) 101 - 110 mmol/L    CO2 29 21 - 32 mmol/L    Anion Gap 6 2 - 11 mmol/L    Glucose 136 (H) 65 - 100 mg/dL    BUN 22 8 - 23 MG/DL    Creatinine 1.08 0.8 - 1.5 MG/DL    Est, Glom Filt Rate >60 >60 ml/min/1.73m2    Calcium 9.4 8.3 - 10.4 MG/DL    Total Bilirubin 0.4 0.2 - 1.1 MG/DL    ALT 32 12 - 65 U/L    AST 27 15 - 37 U/L    Alk Phosphatase 81 50 - 136 U/L    Total Protein 6.9 6.3 - 8.2 g/dL    Albumin 2.9 (L) 3.2 - 4.6 g/dL    Globulin 4.0 2.8 - 4.5 g/dL    Albumin/Globulin Ratio 0.7 0.4 - 1.6     Magnesium    Collection Time: 10/20/22  8:50 PM   Result Value Ref Range    Magnesium 2.4 1.8 - 2.4 mg/dL   Troponin    Collection Time: 10/20/22  8:50 PM   Result Value Ref Range    Troponin, High Sensitivity 10.8 0 - 14 pg/mL   Lipase    Collection Time: 10/20/22  8:50 PM   Result Value Ref Range    Lipase 31 (L) 73 - 393 U/L   Procalcitonin    Collection Time: 10/20/22  8:50 PM   Result Value Ref Range    Procalcitonin <0.05 0.00 - 0.49 ng/mL   Lactic Acid    Collection Time: 10/20/22 10:17 PM   Result Value Ref Range    Lactic Acid, Plasma 1.2 0.4 - 2.0 MMOL/L   Troponin    Collection Time: 10/20/22 11:40 PM   Result Value Ref Range    Troponin, High Sensitivity 9.2 0 - 14 pg/mL           Signed:  Sarah Luke MD

## 2022-10-21 NOTE — ED TRIAGE NOTES
Pt c/o chest pain that started yesterday, states that it feels like a hernia or acid reflux. Pt hypoxic in triage placed on 4LNC. Pt has hx of stage 1 lung cancer, last radiation tx was three months ago. Pt also states that he had a fever for a few days in a row prior to yesterday. Denies N&V, denies fever today. Pt wife states that lung cancer has never affected pts breathing before.

## 2022-10-21 NOTE — PROGRESS NOTES
Hospitalist Progress Note   Admit Date:  10/20/2022  8:38 PM   Name:  Eugene Carson   Age:  81 y.o. Sex:  male  :  1935   MRN:  [id number]   Room:  [facility]/    Reason(s) for Admission: Hypoxia [R09.02]  Pulmonary embolism on right [facility]) [I26.99]  Acute pulmonary embolism without acute cor pulmonale, unspecified pulmonary embolism type [facility]) [facility] Course & Interval History:   Mr. Minal Boswell is a very nice 80 y/o WM with a h/o GERD, HTN and lung cancer (followed by Ivonne) who was admitted to our service on 10/21 with R sided PE. He presented with SOB/MARTIN and chest discomfort. Labs unremarkable, stable anemia. CT chest showed moderate volume R sided PE along with probable atelectasis, lingular nodule, cardiomegaly, stable adrenal nodule and stable hiatal hernia. He was started on a heparin drip and admitted for further management. Subjective/24hr Events (10/21/22): In bed, breathing is the same but looks comfortable. Still requiring 4L NC O2 (not on O2 at baseline). No chest pain currently. No N/V/D or palpitations. Assessment & Plan:   # Acute hypoxemic respiratory failure 2/2 R sided PE   - Hypercoagulable state due to active lung cancer   - RA O2 sat in ER 86%, improved with 4L NC O2   - Con't heparin drip   - Will consult IR to eval for intervention. NPO. # GERD   - PPI    # HTN   - ARB    # HLD   - Statin    # H/o gout   - Allopurinol    Discharge Planning: Pending.   Diet:  Diet NPO  Code status: Full Code    Hospital Problems             Last Modified POA    * (Principal) Pulmonary embolism on right ([address] 10/21/2022 Yes    Essential hypertension 10/21/2022 Yes    GERD (gastroesophageal reflux disease) 10/21/2022 Yes    Malignant 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 ([address] 10/21/2022 Yes    Nontraumatic tear of left rotator cuff 10/21/2022 Yes    Acute hypoxemic respiratory failure ([address] 10/21/2022 Yes       Objective:   Patient Vitals for the past 24 hrs:   Temp Pulse Resp BP SpO2   10/21/22 0727 -- 87 25 -- 93 %   10/21/22 0701 97.4 °F (36.3 °C) -- -- 129/81 --   10/21/22 0700 -- 71 21 129/81 91 %   10/21/22 0600 -- 92 17 115/80 95 %   10/21/22 0500 -- 67 13 99/61 96 %   10/21/22 0400 -- 85 23 132/74 94 %   10/21/22 0352 -- 80 16 -- 94 %   10/21/22 0324 97.2 °F (36.2 °C) 73 14 120/82 95 %   10/21/22 0200 -- 81 16 113/68 94 %   10/21/22 0106 97.7 °F (36.5 °C) 99 15 (!) 159/92 90 %   10/21/22 0000 -- 75 -- 135/80 92 %   10/20/22 2115 -- 85 18 -- 96 %   10/20/22 2037 98.9 °F (37.2 °C) 99 18 121/73 (!) 86 %       Estimated body mass index is 28.07 kg/m² as calculated from the following:    Height as of this encounter: 5' 6\" (1.676 m). Weight as of this encounter: 173 lb 14.4 oz (78.9 kg). Intake/Output Summary (Last 24 hours) at 10/21/2022 0737  Last data filed at 10/21/2022 0606  Gross per 24 hour   Intake 89.91 ml   Output 500 ml   Net -410.09 ml         Physical Exam:   Blood pressure 129/81, pulse 87, temperature 97.4 °F (36.3 °C), temperature source Oral, resp. rate 25, height 5' 6\" (1.676 m), weight 173 lb 14.4 oz (78.9 kg), SpO2 93 %. General:    Well nourished. No overt distress. Pleasant, conversant, appears stated age. Head:  Normocephalic, atraumatic  Eyes:  Sclerae appear normal. Pupils equally round. ENT:  Nares appear normal, no drainage. Moist oral mucosa  Neck:  No restricted ROM. Trachea midline. CV:   RRR. No m/r/g. No jugular venous distension. Lungs:   CTAB. No wheezing, rhonchi, or rales. Respirations even, unlabored. 4L NC O2. Abdomen: Bowel sounds present. Soft, nontender, nondistended. Extremities: No cyanosis or clubbing. No edema. Skin:     No rashes and normal coloration. Warm and dry. Neuro:  CN II-XII grossly intact. Sensation intact. A&Ox3  Psych:  Normal mood and affect.       I have reviewed ordered lab tests and independently visualized imaging below:    Recent Labs:  Recent Results (from the past 48 hour(s))   EKG 12 Lead Collection Time: 10/20/22  8:28 PM   Result Value Ref Range    Ventricular Rate 102 BPM    Atrial Rate 102 BPM    P-R Interval 152 ms    QRS Duration 78 ms    Q-T Interval 324 ms    QTc Calculation (Bazett) 422 ms    P Axis 34 degrees    R Axis 3 degrees    T Axis 22 degrees    Diagnosis Sinus tachycardia with Premature atrial complexes    CBC with Auto Differential    Collection Time: 10/20/22  8:50 PM   Result Value Ref Range    WBC 7.1 4.3 - 11.1 K/uL    RBC 3.63 (L) 4.23 - 5.6 M/uL    Hemoglobin 11.9 (L) 13.6 - 17.2 g/dL    Hematocrit 36.1 (L) 41.1 - 50.3 %    MCV 99.4 82.0 - 102.0 FL    MCH 32.8 26.1 - 32.9 PG    MCHC 33.0 31.4 - 35.0 g/dL    RDW 13.0 11.9 - 14.6 %    Platelets 238 195 - 737 K/uL    MPV 8.7 (L) 9.4 - 12.3 FL    nRBC 0.00 0.0 - 0.2 K/uL    Differential Type AUTOMATED      Seg Neutrophils 74 43 - 78 %    Lymphocytes 12 (L) 13 - 44 %    Monocytes 9 4.0 - 12.0 %    Eosinophils % 5 0.5 - 7.8 %    Basophils 1 0.0 - 2.0 %    Immature Granulocytes 1 0.0 - 5.0 %    Segs Absolute 5.3 1.7 - 8.2 K/UL    Absolute Lymph # 0.8 0.5 - 4.6 K/UL    Absolute Mono # 0.6 0.1 - 1.3 K/UL    Absolute Eos # 0.3 0.0 - 0.8 K/UL    Basophils Absolute 0.1 0.0 - 0.2 K/UL    Absolute Immature Granulocyte 0.0 0.0 - 0.5 K/UL   Comprehensive Metabolic Panel    Collection Time: 10/20/22  8:50 PM   Result Value Ref Range    Sodium 135 133 - 143 mmol/L    Potassium 4.0 3.5 - 5.1 mmol/L    Chloride 100 (L) 101 - 110 mmol/L    CO2 29 21 - 32 mmol/L    Anion Gap 6 2 - 11 mmol/L    Glucose 136 (H) 65 - 100 mg/dL    BUN 22 8 - 23 MG/DL    Creatinine 1.08 0.8 - 1.5 MG/DL    Est, Glom Filt Rate >60 >60 ml/min/1.73m2    Calcium 9.4 8.3 - 10.4 MG/DL    Total Bilirubin 0.4 0.2 - 1.1 MG/DL    ALT 32 12 - 65 U/L    AST 27 15 - 37 U/L    Alk Phosphatase 81 50 - 136 U/L    Total Protein 6.9 6.3 - 8.2 g/dL    Albumin 2.9 (L) 3.2 - 4.6 g/dL    Globulin 4.0 2.8 - 4.5 g/dL    Albumin/Globulin Ratio 0.7 0.4 - 1.6     Magnesium    Collection Time: 10/20/22  8:50 PM   Result Value Ref Range    Magnesium 2.4 1.8 - 2.4 mg/dL   Troponin    Collection Time: 10/20/22  8:50 PM   Result Value Ref Range    Troponin, High Sensitivity 10.8 0 - 14 pg/mL   Lipase    Collection Time: 10/20/22  8:50 PM   Result Value Ref Range    Lipase 31 (L) 73 - 393 U/L   Procalcitonin    Collection Time: 10/20/22  8:50 PM   Result Value Ref Range    Procalcitonin <0.05 0.00 - 0.49 ng/mL   Lactic Acid    Collection Time: 10/20/22 10:17 PM   Result Value Ref Range    Lactic Acid, Plasma 1.2 0.4 - 2.0 MMOL/L   Culture, Blood 1    Collection Time: 10/20/22 10:17 PM    Specimen: Blood   Result Value Ref Range    Special Requests LEFT  HAND        Culture NO GROWTH AFTER 9 HOURS     Culture, Blood 1    Collection Time: 10/20/22 11:40 PM    Specimen: Blood   Result Value Ref Range    Special Requests LEFT  FOREARM        Culture NO GROWTH AFTER 7 HOURS     Troponin    Collection Time: 10/20/22 11:40 PM   Result Value Ref Range    Troponin, High Sensitivity 9.2 0 - 14 pg/mL   Anti-Xa, Unfractionated Heparin    Collection Time: 10/21/22  6:52 AM   Result Value Ref Range    Anti-XA Unfrac Heparin >1.1 (H) 0.3 - 0.7 IU/mL   CBC with Auto Differential    Collection Time: 10/21/22  6:52 AM   Result Value Ref Range    WBC 6.7 4.3 - 11.1 K/uL    RBC 3.53 (L) 4.23 - 5.6 M/uL    Hemoglobin 11.6 (L) 13.6 - 17.2 g/dL    Hematocrit 35.0 (L) 41.1 - 50.3 %    MCV 99.2 82.0 - 102.0 FL    MCH 32.9 26.1 - 32.9 PG    MCHC 33.1 31.4 - 35.0 g/dL    RDW 13.1 11.9 - 14.6 %    Platelets 489 239 - 194 K/uL    MPV 8.8 (L) 9.4 - 12.3 FL    nRBC 0.00 0.0 - 0.2 K/uL    Differential Type AUTOMATED      Seg Neutrophils 91 (H) 43 - 78 %    Lymphocytes 7 (L) 13 - 44 %    Monocytes 1 (L) 4.0 - 12.0 %    Eosinophils % 0 (L) 0.5 - 7.8 %    Basophils 0 0.0 - 2.0 %    Immature Granulocytes 1 0.0 - 5.0 %    Segs Absolute 6.1 1.7 - 8.2 K/UL    Absolute Lymph # 0.5 0.5 - 4.6 K/UL    Absolute Mono # 0.1 0.1 - 1.3 K/UL    Absolute Eos # 0.0 0.0 - 0.8 K/UL    Basophils Absolute 0.0 0.0 - 0.2 K/UL    Absolute Immature Granulocyte 0.1 0.0 - 0.5 K/UL   Basic Metabolic Panel    Collection Time: 10/21/22  6:52 AM   Result Value Ref Range    Sodium 134 133 - 143 mmol/L    Potassium 4.2 3.5 - 5.1 mmol/L    Chloride 99 (L) 101 - 110 mmol/L    CO2 28 21 - 32 mmol/L    Anion Gap 7 2 - 11 mmol/L    Glucose 186 (H) 65 - 100 mg/dL    BUN 20 8 - 23 MG/DL    Creatinine 0.95 0.8 - 1.5 MG/DL    Est, Glom Filt Rate >60 >60 ml/min/1.73m2    Calcium 9.5 8.3 - 10.4 MG/DL         Other Studies:  XR CHEST PORTABLE    Result Date: 10/20/2022  Chest X-ray INDICATION: Chest pain since yesterday AP/PA view of the chest was obtained. FINDINGS: There is increased left perihilar infiltrate/atelectasis. Right lung remains clear. Stable cardiomegaly. There is vascular calcification. Increased left perihilar infiltrate/atelectasis     CT CHEST PULMONARY EMBOLISM W CONTRAST    Result Date: 10/21/2022  Clinical history: Chest pain. Hypoxia TECHNIQUE: Axial, coronal and sagittal CT of the chest with IV contrast. Radiation dose reduction techniques were used for this study:  Our CT scanners use one or all of the following: Automated exposure control, adjustment of the mA and/or kVp according to patient's size, iterative reconstruction. Comparison: May 2022 CT and chest x-ray earlier today FINDINGS: There is pulmonary embolus in the right upper lobe pulmonary artery, extending into the segmental pulmonary arteries. There is pulmonary embolus in the right interlobar pulmonary artery, extending into the lower lobe segmental and subsegmental pulmonary arteries. No left-sided pulmonary emboli are seen. The heart is enlarged. There is no pericardial effusion. No evidence of right heart strain. There is mild coronary artery calcification. The thoracic aorta is normal in course and caliber with atherosclerotic calcification. No evidence of lymphadenopathy.  There are bilateral areas of groundglass densities, likely due to atelectasis or air trapping. Platelike density in the lingula with associated 13 mm nodular component. The nodular component measured up to 3.5 cm on the prior study. There is no endobronchial lesion. There is no pneumothorax or pleural effusion. Included upper abdomen demonstrates micronodular contour of the liver. There is moderate hiatal hernia. Stable right adrenal nodule. There are degenerative changes of the spine. 1. Moderate volume right-sided pulmonary emboli. No evidence of pulmonary infarct of right heart strain. Telephoned to ED physician, Dr. Niki Ashraf, at 12:16 AM on 10/21/2022. 2. Scarring/platelike atelectasis in the lingula with associated 13 mm nodular component. The nodular component measured 3.5 cm on the prior may 2022 study. 3. Bilateral groundglass densities, likely related to atelectasis and air trapping. No evidence of pneumonia or pleural effusion. 4. Cardiomegaly. 5. Stable right adrenal nodule. 6. Stable hiatal hernia.  DC5       Current Meds:  Current Facility-Administered Medications   Medication Dose Route Frequency    heparin (porcine) injection 6,530 Units  80 Units/kg IntraVENous PRN    heparin (porcine) injection 3,260 Units  40 Units/kg IntraVENous PRN    heparin 25,000 units in dextrose 5% 250 mL (premix) infusion  5-30 Units/kg/hr IntraVENous Continuous    allopurinol (ZYLOPRIM) tablet 100 mg  100 mg Oral Daily    aspirin chewable tablet 81 mg  81 mg Oral Daily    atorvastatin (LIPITOR) tablet 10 mg  10 mg Oral Daily    cetirizine (ZYRTEC) tablet 10 mg  10 mg Oral Daily    losartan (COZAAR) tablet 50 mg  50 mg Oral Daily    meclizine (ANTIVERT) tablet 25 mg  25 mg Oral TID PRN    methocarbamol (ROBAXIN) tablet 750 mg  750 mg Oral TID    oxyCODONE-acetaminophen (PERCOCET)  MG per tablet 1 tablet  1 tablet Oral Q6H PRN    pantoprazole (PROTONIX) tablet 40 mg  40 mg Oral QAM AC    sodium chloride flush 0.9 % injection 5-40 mL  5-40 mL IntraVENous 2 times per day    sodium chloride flush 0.9 % injection 5-40 mL  5-40 mL IntraVENous PRN    0.9 % sodium chloride infusion   IntraVENous PRN    ondansetron (ZOFRAN-ODT) disintegrating tablet 4 mg  4 mg Oral Q8H PRN    Or    ondansetron (ZOFRAN) injection 4 mg  4 mg IntraVENous Q6H PRN    polyethylene glycol (GLYCOLAX) packet 17 g  17 g Oral Daily PRN    acetaminophen (TYLENOL) tablet 650 mg  650 mg Oral Q6H PRN    Or    acetaminophen (TYLENOL) suppository 650 mg  650 mg Rectal Q6H PRN       Signed:  Erik Cano MD    Part of this note may have been written by using a voice dictation software. The note has been proof read but may still contain some grammatical/other typographical errors.

## 2022-10-21 NOTE — CARE COORDINATION
Discharge planning was discussed with the Critical Care Interdisciplinary Team. The patient is requiring oxygen and may need coupons for Eliquis or Xarelto once he is medically ready for discharge.

## 2022-10-22 VITALS
HEART RATE: 82 BPM | DIASTOLIC BLOOD PRESSURE: 68 MMHG | BODY MASS INDEX: 27.95 KG/M2 | HEIGHT: 66 IN | OXYGEN SATURATION: 93 % | WEIGHT: 173.9 LBS | RESPIRATION RATE: 18 BRPM | TEMPERATURE: 97.9 F | SYSTOLIC BLOOD PRESSURE: 121 MMHG

## 2022-10-22 LAB
ANION GAP SERPL CALC-SCNC: 6 MMOL/L (ref 2–11)
BASOPHILS # BLD: 0 K/UL (ref 0–0.2)
BASOPHILS NFR BLD: 0 % (ref 0–2)
BUN SERPL-MCNC: 24 MG/DL (ref 8–23)
CALCIUM SERPL-MCNC: 9.3 MG/DL (ref 8.3–10.4)
CHLORIDE SERPL-SCNC: 101 MMOL/L (ref 101–110)
CO2 SERPL-SCNC: 30 MMOL/L (ref 21–32)
CREAT SERPL-MCNC: 1.09 MG/DL (ref 0.8–1.5)
DIFFERENTIAL METHOD BLD: ABNORMAL
EOSINOPHIL # BLD: 0.1 K/UL (ref 0–0.8)
EOSINOPHIL NFR BLD: 0 % (ref 0.5–7.8)
ERYTHROCYTE [DISTWIDTH] IN BLOOD BY AUTOMATED COUNT: 13 % (ref 11.9–14.6)
GLUCOSE SERPL-MCNC: 125 MG/DL (ref 65–100)
HCT VFR BLD AUTO: 33.2 % (ref 41.1–50.3)
HGB BLD-MCNC: 10.9 G/DL (ref 13.6–17.2)
IMM GRANULOCYTES # BLD AUTO: 0.1 K/UL (ref 0–0.5)
IMM GRANULOCYTES NFR BLD AUTO: 1 % (ref 0–5)
LYMPHOCYTES # BLD: 1.3 K/UL (ref 0.5–4.6)
LYMPHOCYTES NFR BLD: 11 % (ref 13–44)
MCH RBC QN AUTO: 32.5 PG (ref 26.1–32.9)
MCHC RBC AUTO-ENTMCNC: 32.8 G/DL (ref 31.4–35)
MCV RBC AUTO: 99.1 FL (ref 82–102)
MONOCYTES # BLD: 0.7 K/UL (ref 0.1–1.3)
MONOCYTES NFR BLD: 6 % (ref 4–12)
NEUTS SEG # BLD: 9.8 K/UL (ref 1.7–8.2)
NEUTS SEG NFR BLD: 81 % (ref 43–78)
NRBC # BLD: 0 K/UL (ref 0–0.2)
PLATELET # BLD AUTO: 221 K/UL (ref 150–450)
PMV BLD AUTO: 8.8 FL (ref 9.4–12.3)
POTASSIUM SERPL-SCNC: 3.9 MMOL/L (ref 3.5–5.1)
RBC # BLD AUTO: 3.35 M/UL (ref 4.23–5.6)
SODIUM SERPL-SCNC: 137 MMOL/L (ref 133–143)
UFH PPP CHRO-ACNC: 0.55 IU/ML (ref 0.3–0.7)
WBC # BLD AUTO: 12 K/UL (ref 4.3–11.1)

## 2022-10-22 PROCEDURE — 85025 COMPLETE CBC W/AUTO DIFF WBC: CPT

## 2022-10-22 PROCEDURE — 2580000003 HC RX 258: Performed by: FAMILY MEDICINE

## 2022-10-22 PROCEDURE — 85520 HEPARIN ASSAY: CPT

## 2022-10-22 PROCEDURE — 80048 BASIC METABOLIC PNL TOTAL CA: CPT

## 2022-10-22 PROCEDURE — 36415 COLL VENOUS BLD VENIPUNCTURE: CPT

## 2022-10-22 PROCEDURE — 6370000000 HC RX 637 (ALT 250 FOR IP): Performed by: INTERNAL MEDICINE

## 2022-10-22 PROCEDURE — 94760 N-INVAS EAR/PLS OXIMETRY 1: CPT

## 2022-10-22 PROCEDURE — 6370000000 HC RX 637 (ALT 250 FOR IP): Performed by: FAMILY MEDICINE

## 2022-10-22 PROCEDURE — 2700000000 HC OXYGEN THERAPY PER DAY

## 2022-10-22 RX ADMIN — ATORVASTATIN CALCIUM 10 MG: 10 TABLET, FILM COATED ORAL at 08:14

## 2022-10-22 RX ADMIN — APIXABAN 10 MG: 5 TABLET, FILM COATED ORAL at 06:29

## 2022-10-22 RX ADMIN — METHOCARBAMOL 750 MG: 750 TABLET ORAL at 08:14

## 2022-10-22 RX ADMIN — SODIUM CHLORIDE, PRESERVATIVE FREE 10 ML: 5 INJECTION INTRAVENOUS at 08:14

## 2022-10-22 RX ADMIN — ASPIRIN 81 MG: 81 TABLET, CHEWABLE ORAL at 08:14

## 2022-10-22 RX ADMIN — ALLOPURINOL 100 MG: 100 TABLET ORAL at 08:14

## 2022-10-22 RX ADMIN — CETIRIZINE HYDROCHLORIDE 10 MG: 10 TABLET ORAL at 08:14

## 2022-10-22 RX ADMIN — LOSARTAN POTASSIUM 50 MG: 50 TABLET, FILM COATED ORAL at 08:14

## 2022-10-22 RX ADMIN — PANTOPRAZOLE SODIUM 40 MG: 40 TABLET, DELAYED RELEASE ORAL at 06:29

## 2022-10-22 NOTE — PROGRESS NOTES
10/22/22 1113   Resting (Room Air)   SpO2 94   HR 75   Resting (On O2)   SpO2 95   HR 68   O2 Device Nasal cannula   O2 Flow Rate (l/min) 3 l/min   During Walk (Room Air)   SpO2 92   HR 78   Walk/Assistance Device Ambulation   Rate of Dyspnea 0   During Walk (On O2)   SpO2   (NA)   After Walk   SpO2 93   HR 79   Does the Patient Need Portable Oxygen Tanks No

## 2022-10-22 NOTE — DISCHARGE INSTRUCTIONS
Pulmonary Embolism: Care Instructions  Overview     Pulmonary embolism is the sudden blockage of an artery in the lung. Blood clots in the deep veins of the leg or pelvis (deep vein thrombosis, or DVT) are the most common cause. These blood clots can travel to the lungs. Pulmonary embolism can be very serious. Because you have had one pulmonary embolism, you are at greater risk for having another one. But you can take steps to prevent another pulmonary embolism. You will probably take a prescription blood-thinning medicine to prevent blood clots. A blood thinner can stop a blood clot from growing larger and prevent new clots from forming. Follow-up care is a key part of your treatment and safety. Be sure to make and go to all appointments, and call your doctor if you are having problems. It's also a good idea to know your test results and keep a list of the medicines you take. How can you care for yourself at home? Take your medicines exactly as prescribed. Call your doctor if you think you are having a problem with your medicine. You will get more details on the specific medicines your doctor prescribes. If you are taking a blood thinner, be sure you get instructions about how to take your medicine safely. Blood thinners can cause serious bleeding problems. Try to walk several times a day. Walking helps keep blood moving in your legs. Before doing other types of exercise, ask your doctor what type and level of exercise is safe for you. Take steps to help prevent blood clots in your legs. For example:  Exercise your lower leg muscles if you sit for long periods of time. Pump your feet up and down by pulling your toes up toward your knees then pointing them down. Repeat. After an illness or surgery, try to get up and out of bed often. If you can't get out of bed, flex your feet every hour to keep the blood moving through your legs. Take plenty of breaks when you travel.  On long car trips, stop the car and walk around every hour or so. On the bus, plane, or train, get out of your seat and walk up and down the aisle every hour, if you can. Wear compression stockings if your doctor recommends them. Check with your doctor about whether you should use hormonal forms of birth control or hormone therapy. These may increase your risk of blood clots. Have a healthy lifestyle. This includes being active, staying at a healthy weight, and not smoking. When should you call for help? Call 911 anytime you think you may need emergency care. For example, call if:    You have shortness of breath. You have chest pain. You passed out (lost consciousness). You cough up blood. Call your doctor now or seek immediate medical care if:    You have new or worsening pain or swelling in your leg. Watch closely for changes in your health, and be sure to contact your doctor if:    You do not get better as expected. Where can you learn more? Go to [email or web address] org and sign in to your [employer] account. Enter C142 in the [employer] box to learn more about \"Pulmonary Embolism: Care Instructions. \"     If you do not have an account, please click on the \"Sign Up Now\" link. Current as of: March 28, 2022               Content Version: 13.4  © 2006-2022 [employer]. Care instructions adapted under license by [facility] ([facility]). If you have questions about a medical condition or this instruction, always ask your healthcare professional. [place] [address] any warranty or liability for your use of this information.

## 2022-10-22 NOTE — DISCHARGE SUMMARY
Hospitalist Discharge Summary   Admit Date:  10/20/2022  8:38 PM   DC Note date: 10/22/2022  Name:  Vincent Hines   Age:  81 y.o. Sex:  male  :  1935   MRN:  [id number]   Room:  [facility]  PCP:  KALIE Watson NP    Presenting Complaint: Shortness of Breath     Initial Admission Diagnosis: Hypoxia [R09.02]  Pulmonary embolism on right [facility]) [I26.99]  Acute pulmonary embolism without acute cor pulmonale, unspecified pulmonary embolism type ([facility] [address] [I26.99]     Problem List for this Hospitalization (present on admission):    Principal Problem:    Pulmonary embolism on right [facility])  Active Problems:    Essential hypertension    GERD (gastroesophageal reflux disease)    Malignant 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 [facility] Course:  Mr. Jonna Oro is a very nice 80 y/o WM with a h/o GERD, HTN and lung cancer (followed by [facility]) who was admitted to our service on 10/21 with R sided PE. He presented with SOB/MARTIN and chest discomfort. Labs unremarkable, stable anemia. CT chest showed moderate volume R sided PE along with probable atelectasis, lingular nodule, cardiomegaly, stable adrenal nodule and stable hiatal hernia. He was started on a heparin drip and admitted for further management. IR was consulted and did not recommend any intervention. He had 2 episodes of transient, asymptomatic sinus tachycardia/SVT and has not had any issues since. He was initially on 4L NC O2. Heparin drip was stopped and he was started on Eliquis on 10/22. He did well with ambulatory oximetry and does not require home oxygen. I have printed him two prescriptions for Eliquis, 1 is so he can fill it today and the other is to mail in to his [address] He is medically stable for discharge home with outpatient follow up. Disposition: Home  Diet: ADULT DIET;  Regular  Code Status: Full Code    Follow Ups:      Time spent in patient discharge and coordination 35 minutes. Follow up labs/diagnostics (ultimately defer to outpatient provider):  N/A    Plan was discussed with patient, RN, CM. All questions answered. Patient was stable at time of discharge. Instructions given to call a physician or return if any concerns. Current Discharge Medication List        START taking these medications    Details   !! apixaban (ELIQUIS) 5 MG TABS tablet Take 2 tablets by mouth 2 times daily Take 2 tablets twice daily through 10/28. Starting on 10/29 take 1 tablet twice daily. Qty: 30 tablet, Refills: 0      !! apixaban (ELIQUIS) 5 MG TABS tablet Take 1 tablet by mouth 2 times daily  Qty: 60 tablet, Refills: 0       !! - Potential duplicate medications found. Please discuss with provider. CONTINUE these medications which have NOT CHANGED    Details   oxyCODONE-acetaminophen (PERCOCET) 5-325 MG per tablet Take 1 tablet by mouth 3 times daily as needed.       allopurinol (ZYLOPRIM) 300 MG tablet Take 100 mg by mouth daily      aspirin 81 MG chewable tablet Take 81 mg by mouth daily      atorvastatin (LIPITOR) 20 MG tablet Take 10 mg by mouth daily      Cetirizine HCl (ZYRTEC ALLERGY) 10 MG CAPS Take by mouth      losartan (COZAAR) 50 MG tablet Take by mouth daily      meclizine (ANTIVERT) 25 MG tablet Take 25 mg by mouth 3 times daily as needed      omeprazole (PRILOSEC) 20 MG delayed release capsule Take 20 mg by mouth daily           STOP taking these medications       oxyCODONE-acetaminophen (PERCOCET)  MG per tablet Comments:   Reason for Stopping:         lidocaine (LMX) 4 % cream Comments:   Reason for Stopping:         Menthol-Methyl Salicylate (THERA-GESIC) 0.5-15 % CREA Comments:   Reason for Stopping:         methocarbamol (ROBAXIN) 750 MG tablet Comments:   Reason for Stopping:         metroNIDAZOLE (METROCREAM) 0.75 % cream Comments:   Reason for Stopping: naproxen (NAPROSYN) 375 MG tablet Comments:   Reason for Stopping:         triamcinolone (KENALOG) 0.025 % cream Comments:   Reason for Stopping:               Procedures done this admission:  * No surgery found *    Consults this admission:  IP CONSULT TO INTERVENTIONAL RADIOLOGY    Echocardiogram results:  No results found for this or any previous visit. Diagnostic Imaging/Tests:   XR CHEST PORTABLE    Result Date: 10/20/2022  Increased left perihilar infiltrate/atelectasis     CT CHEST PULMONARY EMBOLISM W CONTRAST    Result Date: 10/21/2022  1. Moderate volume right-sided pulmonary emboli. No evidence of pulmonary infarct of right heart strain. Telephoned to ED physician, Dr. Radha Mccoy, at 12:16 AM on 10/21/2022. 2. Scarring/platelike atelectasis in the lingula with associated 13 mm nodular component. The nodular component measured 3.5 cm on the prior may 2022 study. 3. Bilateral groundglass densities, likely related to atelectasis and air trapping. No evidence of pneumonia or pleural effusion. 4. Cardiomegaly. 5. Stable right adrenal nodule. 6. Stable hiatal hernia.  DC5        Labs: Results:       BMP, Mg, Phos Recent Labs     10/20/22  2050 10/21/22  0652 10/22/22  0411    134 137   K 4.0 4.2 3.9   * 99* 101   CO2 29 28 30   ANIONGAP 6 7 6   BUN 22 20 24*   CREATININE 1.08 0.95 1.09   LABGLOM >60 >60 >60   CALCIUM 9.4 9.5 9.3   GLUCOSE 136* 186* 125*   MG 2.4  --   --       CBC Recent Labs     10/20/22  2050 10/21/22  0652 10/22/22  0411   WBC 7.1 6.7 12.0*   RBC 3.63* 3.53* 3.35*   HGB 11.9* 11.6* 10.9*   HCT 36.1* 35.0* 33.2*   MCV 99.4 99.2 99.1   MCH 32.8 32.9 32.5   MCHC 33.0 33.1 32.8   RDW 13.0 13.1 13.0    216 221   MPV 8.7* 8.8* 8.8*   NRBC 0.00 0.00 0.00   SEGS 74 91* 81*   LYMPHOPCT 12* 7* 11*   EOSRELPCT 5 0* 0*   MONOPCT 9 1* 6   BASOPCT 1 0 0   IMMGRAN 1 1 1   SEGSABS 5.3 6.1 9.8*   LYMPHSABS 0.8 0.5 1.3   EOSABS 0.3 0.0 0.1   MONOSABS 0.6 0.1 0.7   BASOSABS 0.1 0.0 0.0 ABSIMMGRAN 0.0 0.1 0.1      LFT Recent Labs     10/20/22  2050   BILITOT 0.4   ALKPHOS 81   AST 27   ALT 32   PROT 6.9   LABALBU 2.9*   GLOB 4.0      Cardiac  Lab Results   Component Value Date/Time    TROPHS 9.2 10/20/2022 11:40 PM    TROPHS 10.8 10/20/2022 08:50 PM    TROPHS 7.3 09/09/2022 11:58 AM      Coags No results found for: PROTIME, INR, APTT   A1c No results found for: LABA1C, EAG   Lipids No results found for: CHOL, LDLCALC, LABVLDL, HDL, CHOLHDLRATIO, TRIG   Thyroid  No results found for: Cleo Moss     Most Recent UA Lab Results   Component Value Date/Time    COLORU YELLOW/STRAW 10/07/2022 12:11 AM    APPEARANCE CLEAR 10/07/2022 12:11 AM    SPECGRAV 1.013 10/07/2022 12:11 AM    LABPH 5.0 10/07/2022 12:11 AM    PROTEINU Negative 10/07/2022 12:11 AM    GLUCOSEU Negative 10/07/2022 12:11 AM    KETUA Negative 10/07/2022 12:11 AM    BILIRUBINUR Negative 10/07/2022 12:11 AM    BLOODU Negative 10/07/2022 12:11 AM    UROBILINOGEN 1.0 10/07/2022 12:11 AM    NITRU Negative 10/07/2022 12:11 AM    LEUKOCYTESUR TRACE 10/07/2022 12:11 AM    WBCUA 0-4 10/07/2022 12:11 AM    RBCUA 0-5 10/07/2022 12:11 AM    EPITHUA 0-5 10/07/2022 12:11 AM    BACTERIA Negative 10/07/2022 12:11 AM    LABCAST 0-2 10/07/2022 12:11 AM        Recent Labs     10/20/22  2340 10/20/22  2217   CULTURE NO GROWTH 2 DAYS NO GROWTH 2 DAYS       All Labs from Last 24 Hrs:  Recent Results (from the past 24 hour(s))   Anti-Xa, Unfractionated Heparin    Collection Time: 10/21/22  4:15 PM   Result Value Ref Range    Anti-XA Unfrac Heparin 0.89 (H) 0.3 - 0.7 IU/mL   Anti-Xa, Unfractionated Heparin    Collection Time: 10/21/22 10:00 PM   Result Value Ref Range    Anti-XA Unfrac Heparin 0.53 0.3 - 0.7 IU/mL   Anti-Xa, Unfractionated Heparin    Collection Time: 10/22/22  4:11 AM   Result Value Ref Range    Anti-XA Unfrac Heparin 0.55 0.3 - 0.7 IU/mL   CBC with Auto Differential    Collection Time: 10/22/22  4:11 AM   Result Value Ref Range    WBC 12.0 (H) 4.3 - 11.1 K/uL    RBC 3.35 (L) 4.23 - 5.6 M/uL    Hemoglobin 10.9 (L) 13.6 - 17.2 g/dL    Hematocrit 33.2 (L) 41.1 - 50.3 %    MCV 99.1 82.0 - 102.0 FL    MCH 32.5 26.1 - 32.9 PG    MCHC 32.8 31.4 - 35.0 g/dL    RDW 13.0 11.9 - 14.6 %    Platelets 877 316 - 107 K/uL    MPV 8.8 (L) 9.4 - 12.3 FL    nRBC 0.00 0.0 - 0.2 K/uL    Differential Type AUTOMATED      Seg Neutrophils 81 (H) 43 - 78 %    Lymphocytes 11 (L) 13 - 44 %    Monocytes 6 4.0 - 12.0 %    Eosinophils % 0 (L) 0.5 - 7.8 %    Basophils 0 0.0 - 2.0 %    Immature Granulocytes 1 0.0 - 5.0 %    Segs Absolute 9.8 (H) 1.7 - 8.2 K/UL    Absolute Lymph # 1.3 0.5 - 4.6 K/UL    Absolute Mono # 0.7 0.1 - 1.3 K/UL    Absolute Eos # 0.1 0.0 - 0.8 K/UL    Basophils Absolute 0.0 0.0 - 0.2 K/UL    Absolute Immature Granulocyte 0.1 0.0 - 0.5 K/UL   Basic Metabolic Panel w/ Reflex to MG    Collection Time: 10/22/22  4:11 AM   Result Value Ref Range    Sodium 137 133 - 143 mmol/L    Potassium 3.9 3.5 - 5.1 mmol/L    Chloride 101 101 - 110 mmol/L    CO2 30 21 - 32 mmol/L    Anion Gap 6 2 - 11 mmol/L    Glucose 125 (H) 65 - 100 mg/dL    BUN 24 (H) 8 - 23 MG/DL    Creatinine 1.09 0.8 - 1.5 MG/DL    Est, Glom Filt Rate >60 >60 ml/min/1.73m2    Calcium 9.3 8.3 - 10.4 MG/DL       Allergies   Allergen Reactions    Amlodipine Other (See Comments)     Other reaction(s): Other- (not listed) - Allergy  Shot bp up      Atorvastatin Other (See Comments)    Bupropion Other (See Comments)    Clonidine Other (See Comments)    Doxazosin Other (See Comments)    Hydrochlorothiazide Other (See Comments)    Lisinopril Other (See Comments)    Metoprolol Other (See Comments)    Simvastatin Other (See Comments)       There is no immunization history on file for this patient.     Recent Vital Data:  Patient Vitals for the past 24 hrs:   Temp Pulse Resp BP SpO2   10/22/22 0942 -- 67 -- -- --   10/22/22 0801 97.6 °F (36.4 °C) 64 18 133/75 96 %   10/22/22 0752 -- 65 18 -- 95 %   10/22/22 0309 97.5 °F (36.4 °C) 68 20 123/72 97 %   10/21/22 2300 97.4 °F (36.3 °C) 70 20 136/80 97 %   10/21/22 2055 -- 94 19 -- 97 %   10/21/22 1902 98 °F (36.7 °C) 91 22 (!) 153/75 97 %   10/21/22 1609 98 °F (36.7 °C) (!) 101 22 129/79 94 %   10/21/22 1500 98.3 °F (36.8 °C) (!) 102 23 115/77 93 %   10/21/22 1358 -- -- 21 -- --   10/21/22 1340 -- (!) 152 28 -- 94 %   10/21/22 1202 -- (!) 102 19 -- 92 %   10/21/22 1200 -- (!) 110 27 110/84 (!) 89 %       Oxygen Therapy  SpO2: 96 %  Pulse Oximetry Type: Continuous  Pulse via Oximetry: 100 beats per minute  SPO2 High Alarm Limit: 100  SPO2 Low Alarm Limit POX: 90  Pulse Oximeter Device Mode: Intermittent  Pulse Oximeter Device Location: Right, Hand, Finger  O2 Device: None (Room air)  Oximetry Probe Site Changed: No  Skin Assessment: Clean, dry, & intact  Skin Protection for O2 Device: No  O2 Flow Rate (L/min): 0 L/min  Oxygen Therapy: Supplemental oxygen  O2 Delivery Method: Nasal cannula    Estimated body mass index is 28.07 kg/m² as calculated from the following:    Height as of this encounter: 5' 6\" (1.676 m). Weight as of this encounter: 173 lb 14.4 oz (78.9 kg). No intake or output data in the 24 hours ending 10/22/22 1146      Physical Exam:  General:    Well nourished. No overt distress  Head:  Normocephalic, atraumatic  Eyes:  Sclerae appear normal.  Pupils equally round. HENT:  Nares appear normal, no drainage. Moist mucous membranes  Neck:  No restricted ROM. Trachea midline  CV:   RRR. No m/r/g. No JVD  Lungs:   CTAB. No wheezing, rhonchi, or rales. Respirations even, unlabored  Abdomen:   Soft, nontender, nondistended. Extremities: Warm and dry. No cyanosis or clubbing. No edema. Skin:     No rashes. Normal coloration  Neuro:  CN II-XII grossly intact. Psych:  Normal mood and affect. Signed:  Saint Crumb, MD    Part of this note may have been written by using a voice dictation software.   The note has been proof read but may still contain some grammatical/other typographical errors.

## 2023-02-13 ENCOUNTER — OFFICE VISIT (OUTPATIENT)
Dept: INTERNAL MEDICINE CLINIC | Facility: CLINIC | Age: 88
End: 2023-02-13
Payer: MEDICARE

## 2023-02-13 VITALS
DIASTOLIC BLOOD PRESSURE: 70 MMHG | OXYGEN SATURATION: 96 % | HEART RATE: 88 BPM | SYSTOLIC BLOOD PRESSURE: 126 MMHG | WEIGHT: 186.4 LBS | BODY MASS INDEX: 30.09 KG/M2

## 2023-02-13 DIAGNOSIS — R68.89 OTHER GENERAL SYMPTOMS AND SIGNS: ICD-10-CM

## 2023-02-13 DIAGNOSIS — D64.9 ANEMIA, UNSPECIFIED TYPE: Primary | ICD-10-CM

## 2023-02-13 DIAGNOSIS — Z12.5 SCREENING PSA (PROSTATE SPECIFIC ANTIGEN): ICD-10-CM

## 2023-02-13 DIAGNOSIS — I10 ESSENTIAL HYPERTENSION: ICD-10-CM

## 2023-02-13 DIAGNOSIS — I11.9 HYPERTENSIVE HEART DISEASE, UNSPECIFIED WHETHER HEART FAILURE PRESENT: ICD-10-CM

## 2023-02-13 PROCEDURE — G8427 DOCREV CUR MEDS BY ELIG CLIN: HCPCS | Performed by: INTERNAL MEDICINE

## 2023-02-13 PROCEDURE — 99205 OFFICE O/P NEW HI 60 MIN: CPT | Performed by: INTERNAL MEDICINE

## 2023-02-13 PROCEDURE — G8484 FLU IMMUNIZE NO ADMIN: HCPCS | Performed by: INTERNAL MEDICINE

## 2023-02-13 PROCEDURE — 1123F ACP DISCUSS/DSCN MKR DOCD: CPT | Performed by: INTERNAL MEDICINE

## 2023-02-13 PROCEDURE — 1036F TOBACCO NON-USER: CPT | Performed by: INTERNAL MEDICINE

## 2023-02-13 PROCEDURE — G8417 CALC BMI ABV UP PARAM F/U: HCPCS | Performed by: INTERNAL MEDICINE

## 2023-02-13 RX ORDER — ALLOPURINOL 100 MG/1
100 TABLET ORAL DAILY
Qty: 90 TABLET | Refills: 3 | Status: ON HOLD
Start: 2023-02-13

## 2023-02-13 RX ORDER — MECLIZINE HYDROCHLORIDE 25 MG/1
25 TABLET ORAL DAILY PRN
Qty: 30 TABLET | Refills: 0 | Status: ON HOLD
Start: 2023-02-13

## 2023-02-13 SDOH — ECONOMIC STABILITY: FOOD INSECURITY: WITHIN THE PAST 12 MONTHS, THE FOOD YOU BOUGHT JUST DIDN'T LAST AND YOU DIDN'T HAVE MONEY TO GET MORE.: PATIENT DECLINED

## 2023-02-13 SDOH — ECONOMIC STABILITY: FOOD INSECURITY: WITHIN THE PAST 12 MONTHS, YOU WORRIED THAT YOUR FOOD WOULD RUN OUT BEFORE YOU GOT MONEY TO BUY MORE.: PATIENT DECLINED

## 2023-02-13 SDOH — ECONOMIC STABILITY: HOUSING INSECURITY
IN THE LAST 12 MONTHS, WAS THERE A TIME WHEN YOU DID NOT HAVE A STEADY PLACE TO SLEEP OR SLEPT IN A SHELTER (INCLUDING NOW)?: PATIENT REFUSED

## 2023-02-13 SDOH — ECONOMIC STABILITY: INCOME INSECURITY: HOW HARD IS IT FOR YOU TO PAY FOR THE VERY BASICS LIKE FOOD, HOUSING, MEDICAL CARE, AND HEATING?: PATIENT DECLINED

## 2023-02-13 ASSESSMENT — ENCOUNTER SYMPTOMS
ABDOMINAL PAIN: 0
COUGH: 0
SHORTNESS OF BREATH: 0

## 2023-02-13 ASSESSMENT — PATIENT HEALTH QUESTIONNAIRE - PHQ9
SUM OF ALL RESPONSES TO PHQ QUESTIONS 1-9: 0
SUM OF ALL RESPONSES TO PHQ9 QUESTIONS 1 & 2: 0
SUM OF ALL RESPONSES TO PHQ QUESTIONS 1-9: 0
SUM OF ALL RESPONSES TO PHQ QUESTIONS 1-9: 0
2. FEELING DOWN, DEPRESSED OR HOPELESS: 0
SUM OF ALL RESPONSES TO PHQ QUESTIONS 1-9: 0
1. LITTLE INTEREST OR PLEASURE IN DOING THINGS: 0

## 2023-02-13 NOTE — PROGRESS NOTES
SUBJECTIVE:   Barbra Lopez is a 81 y.o. male seen for a visit regarding   Chief Complaint   Patient presents with    Established New Doctor        HPI  [facility]     Lung Cancer s/p radiation - saw Shreyas Gutierrez, on CT scans every 3 months, has a f/u tomorrow  Pulmonary Embolism, Former smoker - quit 50 years ago, sees Pulmonary Dr. Nicolasa Echols, on Eliquis, has appt. Tomorrow  History of Gout - on Allopurinol  Anemia on labs with fatigue, no bleeding, has fatigue since lung cancer diagnosis  Hyperlipidemia - on Atorvastatin  HTN - no headache  History Gastric ulcers  years ago  Chronic Dizziness, Tinnitus - advised to only use Methocarbomal and Meclizine as needed only    Past Medical History, Past Surgical History, Family history, Social History, and Medications were all reviewed with the patient today and updated as necessary. Current Outpatient Medications   Medication Sig Dispense Refill    METHOCARBAMOL PO Take by mouth      allopurinol (ZYLOPRIM) 100 MG tablet Take 1 tablet by mouth daily 90 tablet 3    meclizine (ANTIVERT) 25 MG tablet Take 1 tablet by mouth daily as needed for Dizziness 30 tablet 0    apixaban (ELIQUIS) 5 MG TABS tablet Take 1 tablet by mouth 2 times daily 60 tablet 0    aspirin 81 MG chewable tablet Take 81 mg by mouth daily      atorvastatin (LIPITOR) 20 MG tablet Take 10 mg by mouth daily      Cetirizine HCl (ZYRTEC ALLERGY) 10 MG CAPS Take by mouth      losartan (COZAAR) 50 MG tablet Take by mouth daily       No current facility-administered medications for this visit. Allergies   Allergen Reactions    Amlodipine Other (See Comments)     Other reaction(s):  Other- (not listed) - Allergy  Shot bp up      Atorvastatin Other (See Comments)    Bupropion Other (See Comments)    Clonidine Other (See Comments)    Doxazosin Other (See Comments)    Hydrochlorothiazide Other (See Comments)    Lisinopril Other (See Comments)    Metoprolol Other (See Comments) Simvastatin Other (See Comments)     Patient Active Problem List   Diagnosis    DDD (degenerative disc disease), cervical    Neck pain    Pulmonary embolism on right [facility])    Essential hypertension    GERD (gastroesophageal reflux disease)    Malignant 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 (HCC)    Nontraumatic tear of left rotator cuff    Acute hypoxemic respiratory failure (HCC)     Past Medical History:   Diagnosis Date    Hypercholesterolemia     Hypertension     Vertigo     Vertigo     Past Surgical History:   Procedure Laterality Date    APPENDECTOMY      GI  1964    Colon resection    NEUROLOGICAL SURGERY  1988    neck     Family History   Problem Relation Age of Onset    Hypertension Father     Hypertension Mother     Osteoarthritis Mother     Stroke Father     Lupus Mother      Social History     Tobacco Use    Smoking status: Never    Smokeless tobacco: Never   Substance Use Topics    Alcohol use: No         Review of Systems   Constitutional:  Negative for fever. Respiratory:  Negative for cough and shortness of breath. Cardiovascular:  Negative for chest pain and leg swelling. Gastrointestinal:  Negative for abdominal pain. Psychiatric/Behavioral:  Negative for behavioral problems and confusion. OBJECTIVE:  /70 (Site: Left Upper Arm, Position: Sitting, Cuff Size: Small Adult)   Pulse 88   Wt 186 lb 6.4 oz (84.6 kg)   SpO2 96%   BMI 30.09 kg/m²      Physical Exam  Vitals and nursing note reviewed. Constitutional:       General: He is not in acute distress. Cardiovascular:      Rate and Rhythm: Normal rate and regular rhythm. Pulmonary:      Effort: Pulmonary effort is normal.      Breath sounds: No wheezing. Neurological:      General: No focal deficit present. Mental Status: He is oriented to person, place, and time.    Psychiatric:         Mood and Affect: Mood normal.         Behavior: Behavior normal.       Medical problems and test results were reviewed with the patient today. No results found for this or any previous visit (from the past 672 hour(s)). ASSESSMENT and PLAN    Henna York was seen today for established new doctor. Diagnoses and all orders for this visit:    Anemia, unspecified type  -     CBC with Auto Differential; Future  -     Ferritin; Future  -     Iron; Future  -     Vitamin B12; Future  -     Folate; Future  -     Path Review, Smear; Future    Essential hypertension  -     Comprehensive Metabolic Panel; Future  -     TSH; Future    Screening PSA (prostate specific antigen)  -     PSA Screening; Future    Other general symptoms and signs   -     Vitamin B12; Future  -     Folate; Future    Other orders  -     allopurinol (ZYLOPRIM) 100 MG tablet; Take 1 tablet by mouth daily  -     meclizine (ANTIVERT) 25 MG tablet; Take 1 tablet by mouth daily as needed for Dizziness        Return for Medicare Wellness Visit( labs ~ 3 days before). It took more than 60 min to care for this pt today  Over 50% of today's office visit was spent in face to face time in counseling   (may include anyor all of the following: reviewing test results, prognosis, importance of compliance, education about disease process, benefits of medications, instructions for management of acute flare-ups, and follow up plans).

## 2023-02-19 ENCOUNTER — APPOINTMENT (OUTPATIENT)
Dept: GENERAL RADIOLOGY | Age: 88
DRG: 175 | End: 2023-02-19
Payer: MEDICARE

## 2023-02-19 ENCOUNTER — HOSPITAL ENCOUNTER (INPATIENT)
Age: 88
LOS: 1 days | Discharge: HOME | DRG: 175 | End: 2023-02-22
Attending: GENERAL PRACTICE | Admitting: INTERNAL MEDICINE
Payer: MEDICARE

## 2023-02-19 ENCOUNTER — APPOINTMENT (OUTPATIENT)
Dept: CT IMAGING | Age: 88
DRG: 175 | End: 2023-02-19
Payer: MEDICARE

## 2023-02-19 ENCOUNTER — APPOINTMENT (OUTPATIENT)
Dept: ULTRASOUND IMAGING | Age: 88
DRG: 175 | End: 2023-02-19
Payer: MEDICARE

## 2023-02-19 DIAGNOSIS — I26.99 PULMONARY EMBOLISM WITHOUT ACUTE COR PULMONALE, UNSPECIFIED CHRONICITY, UNSPECIFIED PULMONARY EMBOLISM TYPE (HCC): Primary | ICD-10-CM

## 2023-02-19 DIAGNOSIS — J18.9 PNEUMONIA OF LEFT UPPER LOBE DUE TO INFECTIOUS ORGANISM: ICD-10-CM

## 2023-02-19 PROBLEM — E78.5 HLD (HYPERLIPIDEMIA): Status: ACTIVE | Noted: 2023-02-19

## 2023-02-19 LAB
ALBUMIN SERPL-MCNC: 3.3 G/DL (ref 3.2–4.6)
ALBUMIN/GLOB SERPL: 0.8 (ref 0.4–1.6)
ALP SERPL-CCNC: 88 U/L (ref 50–136)
ALT SERPL-CCNC: 22 U/L (ref 12–65)
ANION GAP SERPL CALC-SCNC: 7 MMOL/L (ref 2–11)
APTT PPP: 125.4 SEC (ref 24.5–34.2)
APTT PPP: 167.4 SEC (ref 24.5–34.2)
APTT PPP: 38.7 SEC (ref 24.5–34.2)
AST SERPL-CCNC: 20 U/L (ref 15–37)
BILIRUB SERPL-MCNC: 0.6 MG/DL (ref 0.2–1.1)
BUN SERPL-MCNC: 20 MG/DL (ref 8–23)
CALCIUM SERPL-MCNC: 9.1 MG/DL (ref 8.3–10.4)
CHLORIDE SERPL-SCNC: 105 MMOL/L (ref 101–110)
CO2 SERPL-SCNC: 25 MMOL/L (ref 21–32)
CREAT SERPL-MCNC: 1.03 MG/DL (ref 0.8–1.5)
EKG ATRIAL RATE: 139 BPM
EKG DIAGNOSIS: NORMAL
EKG P AXIS: -44 DEGREES
EKG P-R INTERVAL: 131 MS
EKG Q-T INTERVAL: 271 MS
EKG QRS DURATION: 87 MS
EKG QTC CALCULATION (BAZETT): 412 MS
EKG R AXIS: 102 DEGREES
EKG T AXIS: -12 DEGREES
EKG VENTRICULAR RATE: 139 BPM
ERYTHROCYTE [DISTWIDTH] IN BLOOD BY AUTOMATED COUNT: 13.5 % (ref 11.9–14.6)
ERYTHROCYTE [DISTWIDTH] IN BLOOD BY AUTOMATED COUNT: 13.5 % (ref 11.9–14.6)
GLOBULIN SER CALC-MCNC: 3.9 G/DL (ref 2.8–4.5)
GLUCOSE SERPL-MCNC: 106 MG/DL (ref 65–100)
HCT VFR BLD AUTO: 40.6 % (ref 41.1–50.3)
HCT VFR BLD AUTO: 41.9 % (ref 41.1–50.3)
HGB BLD-MCNC: 13.1 G/DL (ref 13.6–17.2)
HGB BLD-MCNC: 13.3 G/DL (ref 13.6–17.2)
INR PPP: 1.3
MAGNESIUM SERPL-MCNC: 2.3 MG/DL (ref 1.8–2.4)
MCH RBC QN AUTO: 32.1 PG (ref 26.1–32.9)
MCH RBC QN AUTO: 32.2 PG (ref 26.1–32.9)
MCHC RBC AUTO-ENTMCNC: 31.7 G/DL (ref 31.4–35)
MCHC RBC AUTO-ENTMCNC: 32.3 G/DL (ref 31.4–35)
MCV RBC AUTO: 101.5 FL (ref 82–102)
MCV RBC AUTO: 99.5 FL (ref 82–102)
NRBC # BLD: 0 K/UL (ref 0–0.2)
NRBC # BLD: 0 K/UL (ref 0–0.2)
NT PRO BNP: 132 PG/ML
PLATELET # BLD AUTO: 251 K/UL (ref 150–450)
PLATELET # BLD AUTO: 254 K/UL (ref 150–450)
PMV BLD AUTO: 9.1 FL (ref 9.4–12.3)
PMV BLD AUTO: 9.4 FL (ref 9.4–12.3)
POTASSIUM SERPL-SCNC: 3.9 MMOL/L (ref 3.5–5.1)
PROT SERPL-MCNC: 7.2 G/DL (ref 6.3–8.2)
PROTHROMBIN TIME: 16.2 SEC (ref 12.6–14.3)
RBC # BLD AUTO: 4.08 M/UL (ref 4.23–5.6)
RBC # BLD AUTO: 4.13 M/UL (ref 4.23–5.6)
SODIUM SERPL-SCNC: 137 MMOL/L (ref 133–143)
TROPONIN I SERPL HS-MCNC: 9 PG/ML (ref 0–14)
TROPONIN I SERPL HS-MCNC: 9.9 PG/ML (ref 0–14)
TSH W FREE THYROID IF ABNORMAL: 2.95 UIU/ML (ref 0.36–3.74)
UFH PPP CHRO-ACNC: >1.1 IU/ML (ref 0.3–0.7)
UFH PPP CHRO-ACNC: >1.1 IU/ML (ref 0.3–0.7)
WBC # BLD AUTO: 7.8 K/UL (ref 4.3–11.1)
WBC # BLD AUTO: 8 K/UL (ref 4.3–11.1)

## 2023-02-19 PROCEDURE — 96365 THER/PROPH/DIAG IV INF INIT: CPT

## 2023-02-19 PROCEDURE — 6360000004 HC RX CONTRAST MEDICATION: Performed by: GENERAL PRACTICE

## 2023-02-19 PROCEDURE — 96368 THER/DIAG CONCURRENT INF: CPT

## 2023-02-19 PROCEDURE — 6370000000 HC RX 637 (ALT 250 FOR IP): Performed by: NURSE PRACTITIONER

## 2023-02-19 PROCEDURE — G0378 HOSPITAL OBSERVATION PER HR: HCPCS

## 2023-02-19 PROCEDURE — 84484 ASSAY OF TROPONIN QUANT: CPT

## 2023-02-19 PROCEDURE — 94761 N-INVAS EAR/PLS OXIMETRY MLT: CPT

## 2023-02-19 PROCEDURE — 93970 EXTREMITY STUDY: CPT

## 2023-02-19 PROCEDURE — 93005 ELECTROCARDIOGRAM TRACING: CPT | Performed by: EMERGENCY MEDICINE

## 2023-02-19 PROCEDURE — 96366 THER/PROPH/DIAG IV INF ADDON: CPT

## 2023-02-19 PROCEDURE — 83880 ASSAY OF NATRIURETIC PEPTIDE: CPT

## 2023-02-19 PROCEDURE — 6360000002 HC RX W HCPCS: Performed by: GENERAL PRACTICE

## 2023-02-19 PROCEDURE — 83735 ASSAY OF MAGNESIUM: CPT

## 2023-02-19 PROCEDURE — 85027 COMPLETE CBC AUTOMATED: CPT

## 2023-02-19 PROCEDURE — 2580000003 HC RX 258: Performed by: GENERAL PRACTICE

## 2023-02-19 PROCEDURE — 85730 THROMBOPLASTIN TIME PARTIAL: CPT

## 2023-02-19 PROCEDURE — 85520 HEPARIN ASSAY: CPT

## 2023-02-19 PROCEDURE — 84443 ASSAY THYROID STIM HORMONE: CPT

## 2023-02-19 PROCEDURE — 96374 THER/PROPH/DIAG INJ IV PUSH: CPT

## 2023-02-19 PROCEDURE — 71260 CT THORAX DX C+: CPT | Performed by: GENERAL PRACTICE

## 2023-02-19 PROCEDURE — 85610 PROTHROMBIN TIME: CPT

## 2023-02-19 PROCEDURE — 2580000003 HC RX 258: Performed by: NURSE PRACTITIONER

## 2023-02-19 PROCEDURE — 99285 EMERGENCY DEPT VISIT HI MDM: CPT

## 2023-02-19 PROCEDURE — 71045 X-RAY EXAM CHEST 1 VIEW: CPT

## 2023-02-19 PROCEDURE — 80053 COMPREHEN METABOLIC PANEL: CPT

## 2023-02-19 RX ORDER — HEPARIN SODIUM 10000 [USP'U]/100ML
5-30 INJECTION, SOLUTION INTRAVENOUS CONTINUOUS
Status: DISCONTINUED | OUTPATIENT
Start: 2023-02-19 | End: 2023-02-22

## 2023-02-19 RX ORDER — SODIUM CHLORIDE 0.9 % (FLUSH) 0.9 %
5-40 SYRINGE (ML) INJECTION PRN
Status: DISCONTINUED | OUTPATIENT
Start: 2023-02-19 | End: 2023-02-22 | Stop reason: HOSPADM

## 2023-02-19 RX ORDER — ACETAMINOPHEN 650 MG/1
650 SUPPOSITORY RECTAL EVERY 6 HOURS PRN
Status: DISCONTINUED | OUTPATIENT
Start: 2023-02-19 | End: 2023-02-22 | Stop reason: HOSPADM

## 2023-02-19 RX ORDER — SODIUM CHLORIDE 0.9 % (FLUSH) 0.9 %
5-40 SYRINGE (ML) INJECTION EVERY 12 HOURS SCHEDULED
Status: DISCONTINUED | OUTPATIENT
Start: 2023-02-19 | End: 2023-02-22 | Stop reason: HOSPADM

## 2023-02-19 RX ORDER — FAMOTIDINE 20 MG/1
20 TABLET, FILM COATED ORAL 2 TIMES DAILY
Status: DISCONTINUED | OUTPATIENT
Start: 2023-02-19 | End: 2023-02-22 | Stop reason: HOSPADM

## 2023-02-19 RX ORDER — SODIUM CHLORIDE 9 MG/ML
INJECTION, SOLUTION INTRAVENOUS PRN
Status: DISCONTINUED | OUTPATIENT
Start: 2023-02-19 | End: 2023-02-22 | Stop reason: HOSPADM

## 2023-02-19 RX ORDER — LOSARTAN POTASSIUM 50 MG/1
50 TABLET ORAL DAILY
Status: DISCONTINUED | OUTPATIENT
Start: 2023-02-19 | End: 2023-02-22 | Stop reason: HOSPADM

## 2023-02-19 RX ORDER — HEPARIN SODIUM 1000 [USP'U]/ML
80 INJECTION, SOLUTION INTRAVENOUS; SUBCUTANEOUS PRN
Status: DISCONTINUED | OUTPATIENT
Start: 2023-02-19 | End: 2023-02-22 | Stop reason: ALTCHOICE

## 2023-02-19 RX ORDER — 0.9 % SODIUM CHLORIDE 0.9 %
100 INTRAVENOUS SOLUTION INTRAVENOUS
Status: COMPLETED | OUTPATIENT
Start: 2023-02-19 | End: 2023-02-19

## 2023-02-19 RX ORDER — HEPARIN SODIUM 1000 [USP'U]/ML
40 INJECTION, SOLUTION INTRAVENOUS; SUBCUTANEOUS PRN
Status: DISCONTINUED | OUTPATIENT
Start: 2023-02-19 | End: 2023-02-22 | Stop reason: ALTCHOICE

## 2023-02-19 RX ORDER — POLYETHYLENE GLYCOL 3350 17 G/17G
17 POWDER, FOR SOLUTION ORAL DAILY PRN
Status: DISCONTINUED | OUTPATIENT
Start: 2023-02-19 | End: 2023-02-22 | Stop reason: HOSPADM

## 2023-02-19 RX ORDER — ONDANSETRON 2 MG/ML
4 INJECTION INTRAMUSCULAR; INTRAVENOUS EVERY 6 HOURS PRN
Status: DISCONTINUED | OUTPATIENT
Start: 2023-02-19 | End: 2023-02-22 | Stop reason: HOSPADM

## 2023-02-19 RX ORDER — ALLOPURINOL 100 MG/1
100 TABLET ORAL DAILY
Status: DISCONTINUED | OUTPATIENT
Start: 2023-02-19 | End: 2023-02-22 | Stop reason: HOSPADM

## 2023-02-19 RX ORDER — ACETAMINOPHEN 325 MG/1
650 TABLET ORAL EVERY 6 HOURS PRN
Status: DISCONTINUED | OUTPATIENT
Start: 2023-02-19 | End: 2023-02-22 | Stop reason: HOSPADM

## 2023-02-19 RX ORDER — ASPIRIN 81 MG/1
81 TABLET, CHEWABLE ORAL DAILY
Status: DISCONTINUED | OUTPATIENT
Start: 2023-02-19 | End: 2023-02-22 | Stop reason: HOSPADM

## 2023-02-19 RX ORDER — ONDANSETRON 4 MG/1
4 TABLET, ORALLY DISINTEGRATING ORAL EVERY 8 HOURS PRN
Status: DISCONTINUED | OUTPATIENT
Start: 2023-02-19 | End: 2023-02-22 | Stop reason: HOSPADM

## 2023-02-19 RX ORDER — ENOXAPARIN SODIUM 100 MG/ML
40 INJECTION SUBCUTANEOUS EVERY 24 HOURS
Status: DISCONTINUED | OUTPATIENT
Start: 2023-02-19 | End: 2023-02-19

## 2023-02-19 RX ORDER — ATORVASTATIN CALCIUM 10 MG/1
10 TABLET, FILM COATED ORAL DAILY
Status: DISCONTINUED | OUTPATIENT
Start: 2023-02-19 | End: 2023-02-22 | Stop reason: HOSPADM

## 2023-02-19 RX ORDER — HEPARIN SODIUM 1000 [USP'U]/ML
80 INJECTION, SOLUTION INTRAVENOUS; SUBCUTANEOUS ONCE
Status: COMPLETED | OUTPATIENT
Start: 2023-02-19 | End: 2023-02-19

## 2023-02-19 RX ADMIN — HEPARIN SODIUM 18 UNITS/KG/HR: 10000 INJECTION, SOLUTION INTRAVENOUS at 09:44

## 2023-02-19 RX ADMIN — HEPARIN SODIUM 6530 UNITS: 1000 INJECTION INTRAVENOUS; SUBCUTANEOUS at 09:43

## 2023-02-19 RX ADMIN — FAMOTIDINE 20 MG: 20 TABLET, FILM COATED ORAL at 21:51

## 2023-02-19 RX ADMIN — SODIUM CHLORIDE 100 ML: 9 INJECTION, SOLUTION INTRAVENOUS at 08:22

## 2023-02-19 RX ADMIN — CEFTRIAXONE 1000 MG: 1 INJECTION, POWDER, FOR SOLUTION INTRAMUSCULAR; INTRAVENOUS at 09:03

## 2023-02-19 RX ADMIN — IOPAMIDOL 100 ML: 755 INJECTION, SOLUTION INTRAVENOUS at 08:22

## 2023-02-19 RX ADMIN — SODIUM CHLORIDE, PRESERVATIVE FREE 10 ML: 5 INJECTION INTRAVENOUS at 21:51

## 2023-02-19 RX ADMIN — AZITHROMYCIN MONOHYDRATE 500 MG: 500 INJECTION, POWDER, LYOPHILIZED, FOR SOLUTION INTRAVENOUS at 09:16

## 2023-02-19 ASSESSMENT — PAIN - FUNCTIONAL ASSESSMENT: PAIN_FUNCTIONAL_ASSESSMENT: NONE - DENIES PAIN

## 2023-02-19 NOTE — ED NOTES
TRANSFER - OUT REPORT:    Verbal report given to Arlette Carmichael  on Lajuanda Mon  being transferred to [facility] for routine progression of patient care       Report consisted of patient's Situation, Background, Assessment and   Recommendations(SBAR). Information from the following report(s) Nurse Handoff Report, ED Encounter Summary, ED SBAR and [facility] was reviewed with the receiving nurse. Lines:   Peripheral IV 02/19/23 Left Antecubital (Active)   Site Assessment Clean, dry & intact 02/19/23 0720   Line Status Blood return noted 02/19/23 0720   Phlebitis Assessment No symptoms 02/19/23 0720   Infiltration Assessment 0 02/19/23 0720       Peripheral IV 02/19/23 Left Hand (Active)   Site Assessment Clean, dry & intact 02/19/23 0937   Line Status Blood return noted 02/19/23 0937   Phlebitis Assessment No symptoms 02/19/23 0937   Infiltration Assessment 0 02/19/23 [id number]        Opportunity for questions and clarification was provided.       Patient transported with:  Registered Nurse     Cielo Kellogg RN  02/19/23 1285

## 2023-02-19 NOTE — ED PROVIDER NOTES
Emergency Department Provider Note                   PCP:                Mary Holloway MD               Age: 81 y.o. Sex: male     No diagnosis found. DISPOSITION          Medical Decision Making  Patient presents with shortness of breath and tachycardia. He does appear to have a new PE in comparison to his October study. Radiologist says it could be older but he is unsure but definitely was not there in October. He also has an organized pneumonia around that area. Patient is tachycardic in the 120s. He is saturating at 93% but is in no respiratory distress and blood pressure is normal.  I doubt other causes such as ACS, CHF, or any other cardiopulmonary emergency. Patient has been compliant with his Eliquis. Unsure if he is taking it with food but we will go ahead and start a heparin infusion. Patient will also be treated with IV antibiotics for his pneumonia. He will be admitted to the hospital for further monitoring and management. Patient's labs did not reveal any other acute abnormality and his chest x-ray shows the left upper lobe infiltrate and cardiomegaly otherwise no other abnormalities. I believe tachycardia is due to the pulmonary embolism and pneumonia. I doubt sepsis    Problems Addressed:  Pneumonia of left upper lobe due to infectious organism: acute illness or injury  Pulmonary embolism without acute cor pulmonale, unspecified chronicity, unspecified pulmonary embolism type [facility]): acute illness or injury    Amount and/or Complexity of Data Reviewed  Labs: ordered. Decision-making details documented in ED Course. Radiology: ordered and independent interpretation performed. Decision-making details documented in ED Course. ECG/medicine tests: ordered and independent interpretation performed. Decision-making details documented in ED Course. Risk  Prescription drug management.        Complexity of Problem:1 acute or chronic illness that poses a threat to life or bodily function. (5)  The patients assessment required an independent historian: I spoke with a family member. I have conducted an independent ordering and review of Labs. I have conducted an independent ordering and review of EKG. I have conducted an independent ordering and review of X-rays. I have conducted an independent ordering and review of CT Scan. I have reviewed records from an external source: previous imaging studies from outside ED. I reviewed previous CT scan from 10/20/2022  Considerations: Shared decision making was utilized in the care of this patient. Patient was admitted I have communication with admitting physician. Orders Placed This Encounter   Procedures    XR CHEST PORTABLE    CBC    Comprehensive Metabolic Panel    Troponin    Magnesium    TSH with Reflex    Brain Natriuretic Peptide    Cardiac Monitor    Pulse Oximetry    EKG 12 Lead    Saline lock IV        Medications - No data to display    New Prescriptions    No medications on file        Ernst Rudolph is a 81 y.o. male who presents to the Emergency Department with chief complaint of    Chief Complaint   Patient presents with    Tachycardia      Patient presents with tachycardia. Patient states he has a monitor at home and he was checking it over the last 3 to 4 days and noted that it has been elevated and increasing. He also has some increased shortness of breath which is mostly dyspnea on exertion. Patient denies chest pain. He denies fevers or cough. Patient does have history of lung cancer and he had a pulmonary embolism about 4 to 5 months ago. Patient is taking Eliquis. Patient denies any vomiting or diarrhea or lightheadedness or near syncope. Review of Systems   All other systems reviewed and are negative.     Past Medical History:   Diagnosis Date    Hypercholesterolemia     Hypertension     Vertigo     Vertigo        Past Surgical History:   Procedure Laterality Date    [address] Colon resection    NEUROLOGICAL SURGERY  1988    neck        Family History   Problem Relation Age of Onset    Hypertension Father     Hypertension Mother     Osteoarthritis Mother     Stroke Father     Lupus Mother         Social History     Socioeconomic History    Marital status:      Spouse name: None    Number of children: None    Years of education: None    Highest education level: None   Tobacco Use    Smoking status: Never    Smokeless tobacco: Never   Substance and Sexual Activity    Alcohol use: No    Drug use: No     Social Determinants of Health     Financial Resource Strain: Unknown    Difficulty of Paying Living Expenses: Patient refused   Food Insecurity: Unknown    Worried About Running Out of Food in the Last Year: Patient refused    Ran Out of Food in the Last Year: Patient refused   Transportation Needs: Unknown    Lack of Transportation (Non-Medical): Patient refused   Housing Stability: Unknown    Unstable Housing in the Last Year: Patient refused         Amlodipine, Atorvastatin, Bupropion, Clonidine, Doxazosin, Hydrochlorothiazide, Lisinopril, Metoprolol, and Simvastatin     Previous Medications    ALLOPURINOL (ZYLOPRIM) 100 MG TABLET    Take 1 tablet by mouth daily    APIXABAN (ELIQUIS) 5 MG TABS TABLET    Take 1 tablet by mouth 2 times daily    ASPIRIN 81 MG CHEWABLE TABLET    Take 81 mg by mouth daily    ATORVASTATIN (LIPITOR) 20 MG TABLET    Take 10 mg by mouth daily    CETIRIZINE HCL (ZYRTEC ALLERGY) 10 MG CAPS    Take by mouth    LOSARTAN (COZAAR) 50 MG TABLET    Take by mouth daily    MECLIZINE (ANTIVERT) 25 MG TABLET    Take 1 tablet by mouth daily as needed for Dizziness    METHOCARBAMOL PO    Take by mouth        Vitals signs and nursing note reviewed. Patient Vitals for the past 4 hrs:   Temp Pulse Resp BP SpO2   02/19/23 0716 97.6 °F (36.4 °C) (!) 140 18 (!) 145/104 93 %          Physical Exam  Constitutional:       General: He is not in acute distress.   HENT: Head: Normocephalic and atraumatic. Right Ear: External ear normal.      Left Ear: External ear normal.      Nose: No congestion or rhinorrhea. Mouth/Throat:      Mouth: Mucous membranes are moist.   Eyes:      Extraocular Movements: Extraocular movements intact. Pupils: Pupils are equal, round, and reactive to light. Cardiovascular:      Rate and Rhythm: Regular rhythm. Tachycardia present. Heart sounds: Normal heart sounds. Pulmonary:      Effort: Pulmonary effort is normal.      Breath sounds: Normal breath sounds. Abdominal:      General: There is no distension. Palpations: Abdomen is soft. Tenderness: There is no abdominal tenderness. Musculoskeletal:         General: No swelling or tenderness. Normal range of motion. Cervical back: Normal range of motion. Skin:     Findings: No rash. Neurological:      General: No focal deficit present. Mental Status: He is alert and oriented to person, place, and time.    Psychiatric:         Mood and Affect: Mood normal.        EKG 12 Lead    Date/Time: 2/19/2023 7:00 AM  Performed by: Suzy Hernandez DO  Authorized by: Yeyo Roca MD     ECG reviewed by ED Physician in the absence of a cardiologist: yes    Interpretation:     Interpretation: normal    Rate:     ECG rate:  139    ECG rate assessment: tachycardic    Rhythm:     Rhythm: sinus tachycardia    ST segments:     ST segments:  Normal  Critical Care  Performed by: Suzy Hernandez DO  Authorized by: Suzy Hernandez DO     Critical care provider statement:     Critical care time (minutes):  40    Critical care time was exclusive of:  Separately billable procedures and treating other patients    Critical care was necessary to treat or prevent imminent or life-threatening deterioration of the following conditions:  Circulatory failure    Critical care was time spent personally by me on the following activities:  Blood draw for specimens, discussions with consultants, evaluation of patient's response to treatment, examination of patient, interpretation of cardiac output measurements, obtaining history from patient or surrogate, ordering and performing treatments and interventions, ordering and review of laboratory studies, ordering and review of radiographic studies, pulse oximetry and re-evaluation of patient's condition    Care discussed with: admitting provider    Comments:      Patient has pulmonary embolism and pneumonia with tachycardia in the 130s. Patient is started on IV antibiotics and IV heparin. Patient continued multiple reevaluations and discussion with radiologist and admitting provider    No results found for any visits on 02/19/23. XR CHEST PORTABLE    (Results Pending)                       Voice dictation software was used during the making of this note. This software is not perfect and grammatical and other typographical errors may be present. This note has not been completely proofread for errors.      Reba Benoit DO  02/1935

## 2023-02-19 NOTE — ACP (ADVANCE CARE PLANNING)
[facility] Hospitalist Service  At the heart of better care     Advance Care Planning   Admit Date:  2023  6:57 AM   Name:  Kiko Nixon   Age:  81 y.o. Sex:  male  :  1935   MRN:  [id number]   Room:  [place] [address]    Kiko Nixon is able to make his own decisions:   Yes    If pt unable to make decisions, POA/surrogate decision maker:  Andrei Carson    Other people present:   NA    Patient / surrogate decision-maker directed code status:  Full code     Other ACP topics discussed, if applicable:   ABT, pulm consult    Patient or surrogate consented to discussion of the current conditions, workup, management plans, prognosis, and the risk for further deterioration. Time spent: 30 minutes in direct discussion.       Signed:  KALIE Rudolph CNP

## 2023-02-19 NOTE — ED TRIAGE NOTES
Patient reports rapid heart rate diagnosed with PE x 6 months ago, on eliquis. Patient states he is checking with SPO2 monitor at home and is consistently elevated above 100 beats. Patient denies pain or SOB. Patient has bilateral lower extremity swelling.

## 2023-02-20 LAB
ANION GAP SERPL CALC-SCNC: 6 MMOL/L (ref 2–11)
APTT PPP: 111.6 SEC (ref 24.5–34.2)
APTT PPP: 118.3 SEC (ref 24.5–34.2)
APTT PPP: 139.5 SEC (ref 24.5–34.2)
BASOPHILS # BLD: 0.1 K/UL (ref 0–0.2)
BASOPHILS NFR BLD: 1 % (ref 0–2)
BUN SERPL-MCNC: 22 MG/DL (ref 8–23)
CALCIUM SERPL-MCNC: 8.9 MG/DL (ref 8.3–10.4)
CHLORIDE SERPL-SCNC: 107 MMOL/L (ref 101–110)
CO2 SERPL-SCNC: 27 MMOL/L (ref 21–32)
CREAT SERPL-MCNC: 0.95 MG/DL (ref 0.8–1.5)
DIFFERENTIAL METHOD BLD: ABNORMAL
EOSINOPHIL # BLD: 0.4 K/UL (ref 0–0.8)
EOSINOPHIL NFR BLD: 5 % (ref 0.5–7.8)
ERYTHROCYTE [DISTWIDTH] IN BLOOD BY AUTOMATED COUNT: 13.7 % (ref 11.9–14.6)
GLUCOSE SERPL-MCNC: 100 MG/DL (ref 65–100)
HCT VFR BLD AUTO: 36.4 % (ref 41.1–50.3)
HGB BLD-MCNC: 11.7 G/DL (ref 13.6–17.2)
IMM GRANULOCYTES # BLD AUTO: 0 K/UL (ref 0–0.5)
IMM GRANULOCYTES NFR BLD AUTO: 0 % (ref 0–5)
LYMPHOCYTES # BLD: 1.6 K/UL (ref 0.5–4.6)
LYMPHOCYTES NFR BLD: 22 % (ref 13–44)
MCH RBC QN AUTO: 32.2 PG (ref 26.1–32.9)
MCHC RBC AUTO-ENTMCNC: 32.1 G/DL (ref 31.4–35)
MCV RBC AUTO: 100.3 FL (ref 82–102)
MONOCYTES # BLD: 0.7 K/UL (ref 0.1–1.3)
MONOCYTES NFR BLD: 10 % (ref 4–12)
NEUTS SEG # BLD: 4.5 K/UL (ref 1.7–8.2)
NEUTS SEG NFR BLD: 62 % (ref 43–78)
NRBC # BLD: 0 K/UL (ref 0–0.2)
PLATELET # BLD AUTO: 216 K/UL (ref 150–450)
PMV BLD AUTO: 9.1 FL (ref 9.4–12.3)
POTASSIUM SERPL-SCNC: 4.1 MMOL/L (ref 3.5–5.1)
RBC # BLD AUTO: 3.63 M/UL (ref 4.23–5.6)
SODIUM SERPL-SCNC: 140 MMOL/L (ref 133–143)
UFH PPP CHRO-ACNC: >1.1 IU/ML (ref 0.3–0.7)
WBC # BLD AUTO: 7.3 K/UL (ref 4.3–11.1)

## 2023-02-20 PROCEDURE — 80048 BASIC METABOLIC PNL TOTAL CA: CPT

## 2023-02-20 PROCEDURE — G0378 HOSPITAL OBSERVATION PER HR: HCPCS

## 2023-02-20 PROCEDURE — 85025 COMPLETE CBC W/AUTO DIFF WBC: CPT

## 2023-02-20 PROCEDURE — 99223 1ST HOSP IP/OBS HIGH 75: CPT | Performed by: INTERNAL MEDICINE

## 2023-02-20 PROCEDURE — 6360000002 HC RX W HCPCS: Performed by: NURSE PRACTITIONER

## 2023-02-20 PROCEDURE — 36415 COLL VENOUS BLD VENIPUNCTURE: CPT

## 2023-02-20 PROCEDURE — 97165 OT EVAL LOW COMPLEX 30 MIN: CPT

## 2023-02-20 PROCEDURE — 97530 THERAPEUTIC ACTIVITIES: CPT

## 2023-02-20 PROCEDURE — 6370000000 HC RX 637 (ALT 250 FOR IP): Performed by: NURSE PRACTITIONER

## 2023-02-20 PROCEDURE — 2580000003 HC RX 258: Performed by: NURSE PRACTITIONER

## 2023-02-20 PROCEDURE — G0378 HOSPITAL OBSERVATION PER HR: HCPCS | Performed by: NURSE PRACTITIONER

## 2023-02-20 PROCEDURE — 85730 THROMBOPLASTIN TIME PARTIAL: CPT

## 2023-02-20 PROCEDURE — 85520 HEPARIN ASSAY: CPT

## 2023-02-20 PROCEDURE — 97161 PT EVAL LOW COMPLEX 20 MIN: CPT

## 2023-02-20 PROCEDURE — 96366 THER/PROPH/DIAG IV INF ADDON: CPT

## 2023-02-20 PROCEDURE — 96368 THER/DIAG CONCURRENT INF: CPT

## 2023-02-20 RX ADMIN — SODIUM CHLORIDE: 9 INJECTION, SOLUTION INTRAVENOUS at 08:54

## 2023-02-20 RX ADMIN — SODIUM CHLORIDE, PRESERVATIVE FREE 10 ML: 5 INJECTION INTRAVENOUS at 20:33

## 2023-02-20 RX ADMIN — FAMOTIDINE 20 MG: 20 TABLET, FILM COATED ORAL at 08:40

## 2023-02-20 RX ADMIN — ASPIRIN 81 MG: 81 TABLET, CHEWABLE ORAL at 08:40

## 2023-02-20 RX ADMIN — ALLOPURINOL 100 MG: 100 TABLET ORAL at 08:39

## 2023-02-20 RX ADMIN — ATORVASTATIN CALCIUM 10 MG: 10 TABLET, FILM COATED ORAL at 08:40

## 2023-02-20 RX ADMIN — FAMOTIDINE 20 MG: 20 TABLET, FILM COATED ORAL at 20:33

## 2023-02-20 RX ADMIN — HEPARIN SODIUM 15 UNITS/KG/HR: 10000 INJECTION, SOLUTION INTRAVENOUS at 07:05

## 2023-02-20 RX ADMIN — CEFTRIAXONE 1000 MG: 1 INJECTION, POWDER, FOR SOLUTION INTRAMUSCULAR; INTRAVENOUS at 08:51

## 2023-02-20 RX ADMIN — HEPARIN SODIUM 13 UNITS/KG/HR: 10000 INJECTION, SOLUTION INTRAVENOUS at 11:30

## 2023-02-20 RX ADMIN — AZITHROMYCIN DIHYDRATE 500 MG: 500 INJECTION, POWDER, LYOPHILIZED, FOR SOLUTION INTRAVENOUS at 10:05

## 2023-02-20 RX ADMIN — LOSARTAN POTASSIUM 50 MG: 50 TABLET, FILM COATED ORAL at 08:40

## 2023-02-20 NOTE — PROGRESS NOTES
OCCUPATIONAL THERAPY Initial Assessment and AM          Acknowledge Orders  Time  OT Charge Capture  Rehab Caseload Tracker      Allegra Metz is a 81 y.o. male   PRIMARY DIAGNOSIS: Acute pulmonary embolism, unspecified pulmonary embolism type, unspecified whether acute cor pulmonale present (HCC)  Pneumonia of left upper lobe due to infectious organism [J18.9]  Pulmonary embolism without acute cor pulmonale, unspecified chronicity, unspecified pulmonary embolism type ([facility] [address] [I26.99]  Acute pulmonary embolism, unspecified pulmonary embolism type, unspecified whether acute cor pulmonale present ([address] [I26.99]       Reason for Referral: Generalized Muscle Weakness (M62.81)  Observation: Payor: MEDICARE / Plan: MEDICARE PART A AND B / Product Type: *No Product type* /     ASSESSMENT:     REHAB RECOMMENDATIONS:   Recommendation to date pending progress:  Setting:  No further skilled therapy after discharge from hospital    Equipment:    None     ASSESSMENT:  Mr. Pantoja Persons sitting up in recliner he was admitted with above diagnosis. He is independent in the room. He is taking himself to the bathroom and worked with PT prior. He lives with his wife and is independent at baseline. He lives in a one level home with a walk in shower. He plans to discharge home with spouse. Spouse recently had shoulder surgery and patients son assisted him. No skilled OT indicated. Will discharge OT. Patient in agreement with poc.       [employer] AM-PAC 6 Clicks Daily Activity Inpatient Short Form:    AM-PAC Daily Activity - Inpatient   How much help is needed for putting on and taking off regular lower body clothing?: None  How much help is needed for bathing (which includes washing, rinsing, drying)?: None  How much help is needed for toileting (which includes using toilet, bedpan, or urinal)?: None  How much help is needed for putting on and taking off regular upper body clothing?: None  How much help is needed for taking care of personal grooming?: None  How much help for eating meals?: None  AM-PAC Inpatient Daily Activity Raw Score: 24  AM-PAC Inpatient ADL T-Scale Score : 57.54  ADL Inpatient CMS 0-100% Score: 0  ADL Inpatient CMS G-Code Modifier : CH           SUBJECTIVE:     Mr. Alvin Gonzales stated his wife was home recenlty had shoulder surgery    Social/Functional Lives With: Spouse  Type of Home: House  Home Layout: One level  Home Access: Stairs to enter with rails  Entrance Stairs - Number of Steps: 1  Entrance Stairs - Rails: Right  Bathroom Shower/Tub: Tub/Shower unit  Home Equipment: Rollator  ADL Assistance: Independent  Ambulation Assistance:  (uses a Rollator out of the home or for distances)  Transfer Assistance: Independent    OBJECTIVE:     Sharon Morocho / Mayda Griffiths / [address]: IV    RESTRICTIONS/PRECAUTIONS:   fall    PAIN: VITALS / O2:   Pre Treatment:      0/10    Post Treatment: 0/10       Vitals          Oxygen            GROSS EVALUATION: INTACT IMPAIRED   (See Comments)   UE AROM [x] []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, 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    UE PROM [] []   Strength []    Generally decreased but functionaL   Posture / Balance [x]     Sensation []     Coordination [x]       Tone []       Edema []    Activity Tolerance [x]    FAIR + IN ROOM   Hand Dominance R [] L []      COGNITION/  PERCEPTION: INTACT IMPAIRED   (See Comments)   Orientation [x]     Vision [x]     Hearing [x]     Cognition  [x]     Perception [x]       MOBILITY: I Mod I S SBA CGA Min Mod Max Total  NT x2 Comments:   Bed Mobility    Rolling [] [] [] [] [] [] [] [] [] [] []    Supine to Sit [] [] [] [] [] [] [] [] [] [] []    Scooting [x] [] [] [] [] [] [] [] [] [] []    Sit to Supine [x] [] [] [] [] [] [] [] [] [] []    Transfers    Sit to Stand [x] [] [] [] [] [] [] [] [] [] []    Bed to Chair [x] [] [] [] [] [] [] [] [] [] []    Stand to Sit [x] [] [] [] [] [] [] [] [] [] []    Tub/Shower [] [] [] [] [] [] [] [] [] [] []     Toilet [] [] [] [] [] [] [] [] [] [] []      [] [] [] [] [] [] [] [] [] [] []    I=Independent, Mod I=Modified Independent, S=Supervision/Setup, SBA=Standby Assistance, CGA=Contact Guard Assistance, Min=Minimal Assistance, Mod=Moderate Assistance, Max=Maximal Assistance, Total=Total Assistance, NT=Not Tested    ACTIVITIES OF DAILY LIVING: I Mod I S SBA CGA Min Mod Max Total NT Comments   BASIC ADLs:              Upper Body Bathing  [] [] [] [] [] [] [] [] [] []    Lower Body Bathing [] [] [] [] [] [] [] [] [] []    Toileting [] [] [] [] [] [] [] [] [] []    Upper Body Dressing [x] [] [] [] [] [] [] [] [] []    Lower Body Dressing [x] [] [] [] [] [] [] [] [] []    Feeding [x] [] [] [] [] [] [] [] [] []    Grooming [] [] [] [] [] [] [] [] [] []    Personal Device Care [] [] [] [] [] [] [] [] [] []    Functional Mobility [x] [] [] [] [] [] [] [] [] []    I=Independent, Mod I=Modified Independent, S=Supervision/Setup, SBA=Standby Assistance, CGA=Contact Guard Assistance, Min=Minimal Assistance, Mod=Moderate Assistance, Max=Maximal Assistance, Total=Total Assistance, NT=Not Tested    PLAN:        TREATMENT:     EVALUATION: LOW COMPLEXITY: (Untimed Charge)       AFTER TREATMENT PRECAUTIONS: Bed, Bed/Chair Locked, Call light within reach, Needs within reach, RN notified, and Side rails x2    INTERDISCIPLINARY COLLABORATION:  RN/ PCT, PT/ PTA, and OT/ FLOYD    EDUCATION:  Education Given To: Patient  Education Provided: Role of Therapy;Plan of Care;Precautions; ADL Adaptive Strategies;Transfer Training;IADL Safety; Fall Prevention Strategies  Education Method: Demonstration;Verbal  Barriers to Learning: None  Education Outcome: Verbalized understanding;Demonstrated understanding    TOTAL TREATMENT DURATION AND TIME:  Time In: 1045  Time Out: [address] [facility]  Minutes: [address], OT

## 2023-02-20 NOTE — PROGRESS NOTES
Hospitalist Progress Note   Admit Date:  2023  6:57 AM   Name:  Ana Laura Borges   Age:  81 y.o. Sex:  male  :  1935   MRN:  [id number]   Room:  365/01    Presenting Complaint: Tachycardia     Reason(s) for Admission: Pneumonia of left upper lobe due to infectious organism [J18.9]  Pulmonary embolism without acute cor pulmonale, unspecified chronicity, unspecified pulmonary embolism type ([facility] [address] [I26.99]  Acute pulmonary embolism, unspecified pulmonary embolism type, unspecified whether acute cor pulmonale present [facility]) [I26.99]     Hospital Course:   Ana Laura Borges is a 81 y.o. male with medical history of PE on Eliquis, FLETCHER neoplasm currently in remission, HLD, HTN who presented with elevated heart rate noted on home monitor. CT PE protocol:  Mural thrombus within the left upper lobe pulmonary artery. 2. Left upper lobe pneumonia. 3. Bibasilar scarring. He was placed on Heparin drip. Patient was never hypoxic. He did receive Rocephin/Zithromax in ED. Patient afebrile, no leukocytosis noted and denies at home. Of note, he is followed by [facility] and [facility] hem/onc. On  he was seen by his pulm-Dr. Berta Aguilar and same consolidation noted at that time. Recs:  \"Consolidation/pneumonia in the left upper lobe inferior portion:  -I discussed the potential etiologies of this finding with the patient, this includes postobstructive pneumonia from endobronchial tumor invasion which I suspect is low on the differential as PET avidity is similar to mediastinal pool, but would be impossible to completely exclude cancer recurrence/progression without bronchoscopy and biopsy. I also discussed the possibility of bronchial stricture versus mucous plugging versus extrinsic compression producing a postobstructive pneumonia. The patient has no significant symptoms at this time his shortness of breath according to him is better and he is not demonstrating evidence of infectious pneumonia.     -I discussed treatment options which include aggressive options such as bronchoscopy as well as conservative options such as a short interval CT of the chest without contrast to evaluate for resolution. The patient elected to proceed with conservative measures  -Flutter valve  -Repeat CT chest in 4 weeks. \"    Admitted for development of PE while on Eliquis    Subjective & 24hr Events (02/20/23): Pt seen and examined. States he still has some SOB with exertion, but HR now controlled. Was consistently 150s-160s at home with rest. Discussed pulm consultation and need to determine new anticoagulant. Assessment & Plan:     Principal Problem:    Acute pulmonary embolism, unspecified pulmonary embolism type, unspecified whether acute cor pulmonale present ([facility] [address]  Plan: Cont heparin gtt. Concern is possible recurrence of of cancer causing hypercoagulable state. Failed Eliquis. If cancer, LWMH or Xa inhibitor preferred. Could switch to Xarelto upon d/c vs Coumadin  - Consider heme consult  - would benefit from hypercoagulable w/u as outpatient    Active Problems:     Essential hypertension  Plan: Stable on losartan      GERD (gastroesophageal reflux disease)  Plan: Cont Pepcid      Malignant 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 ([facility] [address]  Plan: Has infiltrate as mentioned in HPI. Concern for malignancy recurring. Pt is aware. Pulm consulted. - pt was started on IV abx to treat PNA      HLD (hyperlipidemia)  Plan: Cont statin      PNA (pneumonia)  Plan: Cont Rocephin and Azithro  - If elects conservative therapy, repeat imaging in 4-6 weeks to see if resolved      PT/OT evals and PPD needed/ordered? NA    Diet:  ADULT DIET;  Regular  VTE prophylaxis: Already on anticoagulation  Code status: Full Code    Hospital Problems:  Principal Problem:    Acute pulmonary embolism, unspecified pulmonary embolism type, unspecified whether acute cor pulmonale present [facility])  Active Problems:    Pulmonary embolism on right [facility])    Essential hypertension GERD (gastroesophageal reflux disease)    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 *      Objective:   Patient Vitals for the past 24 hrs:   Temp Pulse Resp BP SpO2   02/20/23 1103 97.5 °F (36.4 °C) 85 18 118/66 95 %   02/20/23 0721 97.5 °F (36.4 °C) 80 17 127/85 90 %   02/20/23 0434 97.3 °F (36.3 °C) 69 18 132/84 92 %   02/19/23 2339 97.5 °F (36.4 °C) 76 18 112/67 93 %   02/19/23 1954 97.5 °F (36.4 °C) 86 18 (!) 147/94 95 %   02/19/23 1611 97.5 °F (36.4 °C) 92 18 (!) 137/93 95 %   02/19/23 1300 97.5 °F (36.4 °C) 89 16 (!) 134/98 96 %       Oxygen Therapy  SpO2: 95 %  Pulse Oximeter Device Mode: Continuous  O2 Device: None (Room air)    Estimated body mass index is 29.05 kg/m² as calculated from the following:    Height as of this encounter: 5' 6\" (1.676 m). Weight as of this encounter: 180 lb (81.6 kg). Intake/Output Summary (Last 24 hours) at 2/20/2023 1205  Last data filed at 2/19/2023 1742  Gross per 24 hour   Intake 390 ml   Output --   Net 390 ml         Physical Exam:     Blood pressure 118/66, pulse 85, temperature 97.5 °F (36.4 °C), temperature source Oral, resp. rate 18, height 5' 6\" (1.676 m), weight 180 lb (81.6 kg), SpO2 95 %. General:    Well nourished. Pleasant affect  Head:  Normocephalic, atraumatic  Eyes:  Sclerae appear normal.  Pupils equally round. ENT:  Nares appear normal.  Moist oral mucosa  Neck:  No restricted ROM. Trachea midline   CV:   RRR. No m/r/g. No jugular venous distension. Lungs:   CTAB. No wheezing, rhonchi, or rales. Symmetric expansion. Abdomen:   Soft, nontender, nondistended. Extremities: No cyanosis or clubbing. No edema  Skin:     No rashes and normal coloration. Warm and dry. Neuro:  CN II-XII grossly intact. Psych:  Normal mood and affect.       I have personally reviewed labs and tests:  Recent Labs:  Recent Results (from the past 48 hour(s))   EKG 12 Lead    Collection Time: 02/19/23  7:00 AM   Result Value Ref Range    Ventricular Rate 139 BPM    Atrial Rate 139 BPM    P-R Interval 131 ms    QRS Duration 87 ms    Q-T Interval 271 ms    QTc Calculation (Bazett) 412 ms    P Axis -44 degrees    R Axis 102 degrees    T Axis -12 degrees    Diagnosis Sinus tachycardia    CBC    Collection Time: 02/19/23  7:06 AM   Result Value Ref Range    WBC 7.8 4.3 - 11.1 K/uL    RBC 4.13 (L) 4.23 - 5.6 M/uL    Hemoglobin 13.3 (L) 13.6 - 17.2 g/dL    Hematocrit 41.9 41.1 - 50.3 %    .5 82.0 - 102.0 FL    MCH 32.2 26.1 - 32.9 PG    MCHC 31.7 31.4 - 35.0 g/dL    RDW 13.5 11.9 - 14.6 %    Platelets 038 521 - 859 K/uL    MPV 9.1 (L) 9.4 - 12.3 FL    nRBC 0.00 0.0 - 0.2 K/uL   Comprehensive Metabolic Panel    Collection Time: 02/19/23  7:06 AM   Result Value Ref Range    Sodium 137 133 - 143 mmol/L    Potassium 3.9 3.5 - 5.1 mmol/L    Chloride 105 101 - 110 mmol/L    CO2 25 21 - 32 mmol/L    Anion Gap 7 2 - 11 mmol/L    Glucose 106 (H) 65 - 100 mg/dL    BUN 20 8 - 23 MG/DL    Creatinine 1.03 0.8 - 1.5 MG/DL    Est, Glom Filt Rate >60 >60 ml/min/1.73m2    Calcium 9.1 8.3 - 10.4 MG/DL    Total Bilirubin 0.6 0.2 - 1.1 MG/DL    ALT 22 12 - 65 U/L    AST 20 15 - 37 U/L    Alk Phosphatase 88 50 - 136 U/L    Total Protein 7.2 6.3 - 8.2 g/dL    Albumin 3.3 3.2 - 4.6 g/dL    Globulin 3.9 2.8 - 4.5 g/dL    Albumin/Globulin Ratio 0.8 0.4 - 1.6     Troponin    Collection Time: 02/19/23  7:06 AM   Result Value Ref Range    Troponin, High Sensitivity 9.0 0 - 14 pg/mL   Magnesium    Collection Time: 02/19/23  7:06 AM   Result Value Ref Range    Magnesium 2.3 1.8 - 2.4 mg/dL   TSH with Reflex    Collection Time: 02/19/23  7:06 AM   Result Value Ref Range    TSH w Free Thyroid if Abnormal 2.95 0.358 - 3.740 UIU/ML   Brain Natriuretic Peptide    Collection Time: 02/19/23  7:06 AM   Result Value Ref Range    NT Pro- <450 PG/ML   Troponin    Collection Time: 02/19/23  9:36 AM   Result Value Ref Range    Troponin, High Sensitivity 9.9 0 - 14 pg/mL   APTT    Collection Time: 02/19/23  9:36 AM   Result Value Ref Range    PTT 38.7 (H) 24.5 - 34.2 SEC   Protime-INR    Collection Time: 02/19/23  9:36 AM   Result Value Ref Range    Protime 16.2 (H) 12.6 - 14.3 sec    INR 1.3     CBC    Collection Time: 02/19/23 11:18 AM   Result Value Ref Range    WBC 8.0 4.3 - 11.1 K/uL    RBC 4.08 (L) 4.23 - 5.6 M/uL    Hemoglobin 13.1 (L) 13.6 - 17.2 g/dL    Hematocrit 40.6 (L) 41.1 - 50.3 %    MCV 99.5 82.0 - 102.0 FL    MCH 32.1 26.1 - 32.9 PG    MCHC 32.3 31.4 - 35.0 g/dL    RDW 13.5 11.9 - 14.6 %    Platelets 424 367 - 258 K/uL    MPV 9.4 9.4 - 12.3 FL    nRBC 0.00 0.0 - 0.2 K/uL   Anti-Xa, Unfractionated Heparin    Collection Time: 02/19/23  3:29 PM   Result Value Ref Range    Anti-XA Unfrac Heparin >1.1 (H) 0.3 - 0.7 IU/mL   APTT    Collection Time: 02/19/23  3:29 PM   Result Value Ref Range    .4 (HH) 24.5 - 34.2 SEC   APTT    Collection Time: 02/19/23  7:21 PM   Result Value Ref Range    .4 (H) 24.5 - 34.2 SEC   Anti-Xa, Unfractionated Heparin    Collection Time: 02/19/23  9:31 PM   Result Value Ref Range    Anti-XA Unfrac Heparin >1.1 (H) 0.3 - 0.7 IU/mL   Anti-Xa, Unfractionated Heparin    Collection Time: 02/20/23  3:28 AM   Result Value Ref Range    Anti-XA Unfrac Heparin >1.1 (H) 0.3 - 0.7 IU/mL   Basic Metabolic Panel w/ Reflex to MG    Collection Time: 02/20/23  3:28 AM   Result Value Ref Range    Sodium 140 133 - 143 mmol/L    Potassium 4.1 3.5 - 5.1 mmol/L    Chloride 107 101 - 110 mmol/L    CO2 27 21 - 32 mmol/L    Anion Gap 6 2 - 11 mmol/L    Glucose 100 65 - 100 mg/dL    BUN 22 8 - 23 MG/DL    Creatinine 0.95 0.8 - 1.5 MG/DL    Est, Glom Filt Rate >60 >60 ml/min/1.73m2    Calcium 8.9 8.3 - 10.4 MG/DL   CBC with Auto Differential    Collection Time: 02/20/23  3:28 AM   Result Value Ref Range    WBC 7.3 4.3 - 11.1 K/uL    RBC 3.63 (L) 4.23 - 5.6 M/uL    Hemoglobin 11.7 (L) 13.6 - 17.2 g/dL    Hematocrit 36.4 (L) 41.1 - 50.3 %    .3 82.0 - 102.0 FL    MCH 32.2 26.1 - 32.9 PG    MCHC 32.1 31.4 - 35.0 g/dL    RDW 13.7 11.9 - 14.6 %    Platelets 751 402 - 857 K/uL    MPV 9.1 (L) 9.4 - 12.3 FL    nRBC 0.00 0.0 - 0.2 K/uL    Differential Type AUTOMATED      Seg Neutrophils 62 43 - 78 %    Lymphocytes 22 13 - 44 %    Monocytes 10 4.0 - 12.0 %    Eosinophils % 5 0.5 - 7.8 %    Basophils 1 0.0 - 2.0 %    Immature Granulocytes 0 0.0 - 5.0 %    Segs Absolute 4.5 1.7 - 8.2 K/UL    Absolute Lymph # 1.6 0.5 - 4.6 K/UL    Absolute Mono # 0.7 0.1 - 1.3 K/UL    Absolute Eos # 0.4 0.0 - 0.8 K/UL    Basophils Absolute 0.1 0.0 - 0.2 K/UL    Absolute Immature Granulocyte 0.0 0.0 - 0.5 K/UL   APTT    Collection Time: 02/20/23  3:28 AM   Result Value Ref Range    .3 (H) 24.5 - 34.2 SEC   APTT    Collection Time: 02/20/23  9:26 AM   Result Value Ref Range    .5 (H) 24.5 - 34.2 SEC       I have personally reviewed imaging studies:  Other Studies:  Vascular duplex lower extremity venous bilateral   Final Result   1. No evidence of deep venous thrombosis in either lower extremity. CT CHEST PULMONARY EMBOLISM W CONTRAST   Final Result      XR CHEST PORTABLE   Final Result   Patchy airspace disease of the lung bases, left greater than right that either    represents pneumonia or atelectasis.        Gabe Charles M.D.    2/19/2023 7:49:00 AM          Current Meds:  Current Facility-Administered Medications   Medication Dose Route Frequency    heparin (porcine) injection 6,530 Units  80 Units/kg IntraVENous PRN    heparin (porcine) injection 3,260 Units  40 Units/kg IntraVENous PRN    heparin 25,000 units in dextrose 5% 250 mL (premix) infusion  5-30 Units/kg/hr IntraVENous Continuous    cefTRIAXone (ROCEPHIN) 1,000 mg in sodium chloride 0.9 % 50 mL IVPB (mini-bag)  1,000 mg IntraVENous Q24H    azithromycin (ZITHROMAX) 500 mg in sodium chloride 0.9 % 250 mL IVPB ([id number])  500 mg IntraVENous Q24H    sodium chloride flush 0.9 % injection 5-40 mL  5-40 mL IntraVENous 2 times per day    sodium chloride flush 0.9 % injection 5-40 mL  5-40 mL IntraVENous PRN    0.9 % sodium chloride infusion   IntraVENous PRN    ondansetron (ZOFRAN-ODT) disintegrating tablet 4 mg  4 mg Oral Q8H PRN    Or    ondansetron (ZOFRAN) injection 4 mg  4 mg IntraVENous Q6H PRN    polyethylene glycol (GLYCOLAX) packet 17 g  17 g Oral Daily PRN    acetaminophen (TYLENOL) tablet 650 mg  650 mg Oral Q6H PRN    Or    acetaminophen (TYLENOL) suppository 650 mg  650 mg Rectal Q6H PRN    famotidine (PEPCID) tablet 20 mg  20 mg Oral BID    allopurinol (ZYLOPRIM) tablet 100 mg  100 mg Oral Daily    aspirin chewable tablet 81 mg  81 mg Oral Daily    atorvastatin (LIPITOR) tablet 10 mg  10 mg Oral Daily    losartan (COZAAR) tablet 50 mg  50 mg Oral Daily       Signed:  Scott Dotson PA-C

## 2023-02-20 NOTE — PROGRESS NOTES
ACUTE PHYSICAL THERAPY GOALS:   (Developed with and agreed upon by patient and/or caregiver.)  ALL GOALS MET 2/20/23 :  (1.)Mr. Kit Sotelo will move from supine to sit and sit to supine  with INDEPENDENT. (2.)Mr. Kit Sotelo will transfer from bed to chair and chair to bed with SUPERVISION. (3.)Mr. Kit Sotelo will ambulate with SUPERVISION for 100 feet. 4) Pt able to go up & down 1 step with& supervision. ________     PHYSICAL THERAPY Initial Assessment, Discharge, and AM  (Link to Caseload Tracking: PT Visit Days : 1  Acknowledge Orders  Time In/Out  PT Charge Capture  Rehab Caseload Tracker    Allegra Metz is a 81 y.o. male   PRIMARY DIAGNOSIS: Acute pulmonary embolism, unspecified pulmonary embolism type, unspecified whether acute cor pulmonale present (HCC)  Pneumonia of left upper lobe due to infectious organism [J18.9]  Pulmonary embolism without acute cor pulmonale, unspecified chronicity, unspecified pulmonary embolism type ([facility]) [I26.99]  Acute pulmonary embolism, unspecified pulmonary embolism type, unspecified whether acute cor pulmonale present ([facility] [address] [I26.99]       Reason for Referral: Generalized Muscle Weakness (M62.81)  Observation: Payor: MEDICARE / Plan: MEDICARE PART A AND B / Product Type: *No Product type* /     ASSESSMENT:     REHAB RECOMMENDATIONS:   Recommendation to date pending progress:  Setting:  No further skilled therapy after discharge from hospital    Equipment:    None     ASSESSMENT:  Mr. Kit Sotelo presented as alert & oriented , participated well with therapy with pt being generally deconditioned but not showing any major functional deficits. Pt is functioning at a supervision level currently with no skilled PT follow up needed at this time. This pt was advised to clock more time out of bed, to perform HEP with written guidelines along with walking with family or staff to the ICU entrance 3x a day.      [facility] Mobility Inpatient [place] Form  AM-PAC Basic Mobility - Inpatient   How much help is needed turning from your back to your side while in a flat bed without using bedrails?: None  How much help is needed moving from lying on your back to sitting on the side of a flat bed without using bedrails?: None  How much help is needed moving to and from a bed to a chair?: None  How much help is needed standing up from a chair using your arms?: None  How much help is needed walking in hospital room?: None  How much help is needed climbing 3-5 steps with a railing?: None  AM-PAC Inpatient Mobility Raw Score : 24  AM-PAC Inpatient T-Scale Score : 61.14  Mobility Inpatient CMS 0-100% Score: 0  Mobility Inpatient CMS G-Code Modifier : CH    SUBJECTIVE:   Mr. Jonna Oro states, \" I like to do things myself \"    Social/Functional Lives With: Spouse  Type of Home: House  Home Layout: One level  Home Access: Stairs to enter with rails  Entrance Stairs - Number of Steps: 1  Entrance Stairs - Rails: Right  Bathroom Shower/Tub: Tub/Shower unit  Home Equipment: Rollator  ADL Assistance: Independent  Ambulation Assistance:  (uses a Rollator out of the home or for distances)  Transfer Assistance: Independent    OBJECTIVE:     PAIN: Christina Blight / O2: PRECAUTION / Trudi Palafox / Susi Willoughby:   Pre Treatment:   Pain Assessment: None - Denies Pain      Post Treatment: 0/10 Vitals NT       Oxygen NT RA      IV    RESTRICTIONS/PRECAUTIONS:  Restrictions/Precautions: Up Ad Aleja (with supervision)                 GROSS EVALUATION: Intact Impaired (Comments):   AROM []  Decreased but functional   PROM []    Strength []  Decreased but functional   Balance []  Decreased but functional   Posture [] N/A   Sensation [x]  grossly   Coordination []   Decreased but functional   Tone [x]     Edema []    Activity Tolerance []  Fair-    []      COGNITION/  PERCEPTION: Intact Impaired (Comments):   Orientation [x]     Vision [x]     Hearing [x]     Cognition  [x]       MOBILITY: I Mod I S SBA CGA Min Mod Max Total  NT x2 Comments:   Bed Mobility    Rolling [x] [] [] [] [] [] [] [] [] [] []    Supine to Sit [x] [] [] [] [] [] [] [] [] [] []    Scooting [x] [] [] [] [] [] [] [] [] [] []    Sit to Supine [] [] [] [] [] [] [] [] [] [] []    Transfers    Sit to Stand [] [x] [] [] [] [] [] [] [] [] []    Bed to Chair [] [x] [] [] [] [] [] [] [] [] []    Stand to Sit [] [x] [] [] [] [] [] [] [] [] []     [] [] [] [] [] [] [] [] [] [] []    I=Independent, Mod I=Modified Independent, S=Supervision, SBA=Standby Assistance, CGA=Contact Guard Assistance,   Min=Minimal Assistance, Mod=Moderate Assistance, Max=Maximal Assistance, Total=Total Assistance, NT=Not Tested    GAIT: I Mod I S SBA CGA Min Mod Max Total  NT x2 Comments:   Level of Assistance [] [] [x] [] [] [] [] [] [] [] []    Distance additional 100ft an initial 50ft gait assessment     DME None    Gait Quality Decreased step clearance and Decreased step length    Weightbearing Status Restrictions/Precautions  Restrictions/Precautions: Up Ad Aleja (with supervision)    Stairs Stairs/Curb  Stairs?: Yes  Stairs  # Steps : 1  Rails: None  Assistance: Supervision    I=Independent, Mod I=Modified Independent, S=Supervision, SBA=Standby Assistance, CGA=Contact Guard Assistance,   Min=Minimal Assistance, Mod=Moderate Assistance, Max=Maximal Assistance, Total=Total Assistance, NT=Not Tested    PLAN:   FREQUENCY AND DURATION:  (Eval, Treat & DC)     THERAPY PROGNOSIS:  N/A    PROBLEM LIST:   (Skilled intervention is medically necessary to address:)  N/A INTERVENTIONS PLANNED:   (Benefits and precautions of physical therapy have been discussed with the patient.)  N/A       TREATMENT:   EVALUATION: LOW COMPLEXITY: (Untimed Charge)    TREATMENT:   Therapeutic Activity (27 Minutes):  Therapeutic activity included reviewed with pt PT role, POC & PT expectations for DC, pt performed standing balance activity ( repeated reaching overhead, in front & repeated trunk rotation) pt also performed repeated box stepping to demonstrate changing directions, progressive gait training an additional 100 ft after an initial 50 ft gait assessment, & repeated single step stair ambulation, also reviewed with pt HEP with written guidelines provided along with a walking program  to improve functional Activity tolerance, Balance, Coordination, Mobility, Strength, and ROM. TREATMENT GRID:  N/A    AFTER TREATMENT PRECAUTIONS: Bed/Chair Locked, Call light within reach, Chair, Needs within reach, and RN notified    INTERDISCIPLINARY COLLABORATION:  RN/ PCT and OT/ FLOYD    EDUCATION: Education Given To: Patient  Education Provided: Role of Therapy;Plan of Care;Home Exercise Program;Precautions;Transfer Training;IADL Safety; Fall Prevention Strategies  Education Method: Demonstration;Verbal;Teach Back;Printed Information/Hand-outs  Education Outcome: Verbalized understanding    TIME IN/OUT:  Time In: 5001  Time Out: 833 [facility]  Minutes: 140 Rafa [address]

## 2023-02-20 NOTE — PLAN OF CARE
Problem: Discharge Planning  Goal: Discharge to home or other facility with appropriate resources  2/20/2023 1231 by Emma Ocampo RN  Outcome: Progressing  Flowsheets (Taken 2/20/2023 0740)  Discharge to home or other facility with appropriate resources:   Identify barriers to discharge with patient and caregiver   Arrange for needed discharge resources and transportation as appropriate   Identify discharge learning needs (meds, wound care, etc)  2/20/2023 0327 by Marcella Burks RN  Outcome: Progressing     Problem: Safety - Adult  Goal: Free from fall injury  2/20/2023 1231 by Emma Ocampo RN  Outcome: Progressing  2/20/2023 0327 by Marcella Burks RN  Outcome: Progressing

## 2023-02-20 NOTE — CARE COORDINATION
02/20/23 1515   Service Assessment   Patient Orientation Alert and Oriented   Cognition Alert   History Provided By Patient   Primary Caregiver Self   Support Systems Spouse/Significant Other;Children   PCP Verified by CM Yes   Prior Functional Level Independent in ADLs/IADLs   Current Functional Level Independent in ADLs/IADLs   Can patient return to prior living arrangement Yes   Ability to make needs known: Good   Family able to assist with home care needs: Other (comment)  (unknown. He stated his wife just had surgery.)   Would you like for me to discuss the discharge plan with any other family members/significant others, and if so, who? Yes  (spouse)   Financial Resources  ([place]);[place]are   [employer] Other (Comment)  (He was provided with an [employer].)   Social/Functional History   Lives With Spouse   Type of Home [employer], standard   ADL Assistance Independent   Mode of Transportation Car   Discharge Planning   Type of [facility] Spouse/Significant Other   [facility] To [facility]mission [facility]   Potential Assistance Needed N/A   DME Ordered? No   Potential Assistance Purchasing Medications No   Type of Home Care Services None   Patient expects to be discharged to: [address] [address] Discharge   Services At/After Discharge None     RN KANG met with the patient at the bedside and discussed discharge planning. He lives with his wife and is independent of ADLs. He has a rollator. He was evaluated by therapy services today. He stated he goes to the [facility]. RN CM encouraged him to ask questions. No case management needs were identified at this time. Case Management will continue to follow him for discharge planning needs. The Medicare Outpatient Observation Notice was delivered and explained to the patient. He verbalized understanding of the information and signed the letter.  The patient was given a copy of the letter and the original was placed in the chart.

## 2023-02-21 LAB
APTT PPP: 93.7 SEC (ref 24.5–34.2)
ERYTHROCYTE [DISTWIDTH] IN BLOOD BY AUTOMATED COUNT: 13.7 % (ref 11.9–14.6)
HCT VFR BLD AUTO: 35.2 % (ref 41.1–50.3)
HGB BLD-MCNC: 11.2 G/DL (ref 13.6–17.2)
MCH RBC QN AUTO: 31.5 PG (ref 26.1–32.9)
MCHC RBC AUTO-ENTMCNC: 31.8 G/DL (ref 31.4–35)
MCV RBC AUTO: 99.2 FL (ref 82–102)
NRBC # BLD: 0 K/UL (ref 0–0.2)
PLATELET # BLD AUTO: 214 K/UL (ref 150–450)
PMV BLD AUTO: 9.3 FL (ref 9.4–12.3)
RBC # BLD AUTO: 3.55 M/UL (ref 4.23–5.6)
WBC # BLD AUTO: 6.5 K/UL (ref 4.3–11.1)

## 2023-02-21 PROCEDURE — 6360000002 HC RX W HCPCS: Performed by: NURSE PRACTITIONER

## 2023-02-21 PROCEDURE — 6370000000 HC RX 637 (ALT 250 FOR IP): Performed by: NURSE PRACTITIONER

## 2023-02-21 PROCEDURE — 99223 1ST HOSP IP/OBS HIGH 75: CPT | Performed by: INTERNAL MEDICINE

## 2023-02-21 PROCEDURE — 2580000003 HC RX 258: Performed by: NURSE PRACTITIONER

## 2023-02-21 PROCEDURE — 1100000003 HC PRIVATE W/ TELEMETRY

## 2023-02-21 PROCEDURE — 85027 COMPLETE CBC AUTOMATED: CPT

## 2023-02-21 PROCEDURE — 36415 COLL VENOUS BLD VENIPUNCTURE: CPT

## 2023-02-21 PROCEDURE — 96366 THER/PROPH/DIAG IV INF ADDON: CPT

## 2023-02-21 RX ADMIN — HEPARIN SODIUM 13 UNITS/KG/HR: 10000 INJECTION, SOLUTION INTRAVENOUS at 06:59

## 2023-02-21 RX ADMIN — SODIUM CHLORIDE, PRESERVATIVE FREE 10 ML: 5 INJECTION INTRAVENOUS at 20:33

## 2023-02-21 RX ADMIN — LOSARTAN POTASSIUM 50 MG: 50 TABLET, FILM COATED ORAL at 09:41

## 2023-02-21 RX ADMIN — FAMOTIDINE 20 MG: 20 TABLET, FILM COATED ORAL at 20:31

## 2023-02-21 RX ADMIN — CEFTRIAXONE 1000 MG: 1 INJECTION, POWDER, FOR SOLUTION INTRAMUSCULAR; INTRAVENOUS at 09:58

## 2023-02-21 RX ADMIN — ASPIRIN 81 MG: 81 TABLET, CHEWABLE ORAL at 09:41

## 2023-02-21 RX ADMIN — AZITHROMYCIN DIHYDRATE 500 MG: 500 INJECTION, POWDER, LYOPHILIZED, FOR SOLUTION INTRAVENOUS at 11:23

## 2023-02-21 RX ADMIN — FAMOTIDINE 20 MG: 20 TABLET, FILM COATED ORAL at 09:41

## 2023-02-21 RX ADMIN — ATORVASTATIN CALCIUM 10 MG: 10 TABLET, FILM COATED ORAL at 09:41

## 2023-02-21 RX ADMIN — ALLOPURINOL 100 MG: 100 TABLET ORAL at 09:41

## 2023-02-21 NOTE — PROGRESS NOTES
Hospitalist Progress Note   Admit Date:  2023  6:57 AM   Name:  Yajaira Alexandra   Age:  81 y.o. Sex:  male  :  1935   MRN:  [id number]   Room:  365/01    Presenting Complaint: Tachycardia     Reason(s) for Admission: Pneumonia of left upper lobe due to infectious organism [J18.9]  Pulmonary embolism without acute cor pulmonale, unspecified chronicity, unspecified pulmonary embolism type ([facility] [address] [I26.99]  Acute pulmonary embolism, unspecified pulmonary embolism type, unspecified whether acute cor pulmonale present [facility]) [I26.99]     Hospital Course:     Yajaira Alexandra is a 81 y.o. male with medical history of PE on Eliquis, FLETCHER neoplasm currently in remission, HLD, HTN who presented with elevated heart rate noted on home monitor. CT chested found left upper lobe pneumonia and mural thrombus within the left upper lobe pulmonary artery. He was started on heparin gtt. On RA. Received Rocephin/azithromycin in ED. Is followed at [facility].   had CT chest with Pulmonary at [facility] Dr. Giovanna Hodge, findings of left upper lobe inferior consolidation vs pneumonia vs tumor invasion. Plans to repeat CT chest in 4 weeks. Hematology and Pulmonary consulted. Subjective & 24hr Events (23): On RA. Denies SOB, n/vd/, abd pain. Assessment & Plan:     Acute pulmonary embolism, unspecified pulmonary embolism type, unspecified whether acute cor pulmonale present (HCC)  Continue heparin gtt  Appears to be true eliquis failure  Hematology following, recommends Pradaxa (lovenox would be problematic due to cost, warfarin due to monitoring standpoint and less preferred in malignancy, xarelto has same mechanism as eliquis)      Active Problems:     Essential hypertension  Plan: Stable on losartan       GERD (gastroesophageal reflux disease)  Plan: Cont Pepcid       Malignant 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 ([facility] [address]  Plan: Has infiltrate as mentioned in HPI. Concern for malignancy recurring.  Pt is aware. Pulm consulted. Needs outpatient bronch however pt has declined in the past        HLD (hyperlipidemia)  Plan: Cont statin       PNA (pneumonia)  Plan: Cont Rocephin and Azithro  - If elects conservative therapy, repeat imaging in 4-6 weeks to see if resolved        PT/OT evals and PPD needed/ordered? NA     Diet:  ADULT DIET; Regular  VTE prophylaxis: Already on anticoagulation  Code status: Full Code    Hospital Problems:  Principal Problem:    Acute pulmonary embolism, unspecified pulmonary embolism type, unspecified whether acute cor pulmonale present (HCC)  Active Problems:    Pulmonary embolism without acute cor pulmonale (HCC)    Essential hypertension    GERD (gastroesophageal reflux disease)    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 *      Objective:   Patient Vitals for the past 24 hrs:   Temp Pulse Resp BP SpO2   02/21/23 1140 97.2 °F (36.2 °C) 87 17 129/82 95 %   02/21/23 0728 97.5 °F (36.4 °C) 69 18 108/66 93 %   02/21/23 0306 97.7 °F (36.5 °C) 89 16 (!) 155/91 95 %   02/20/23 2307 97.9 °F (36.6 °C) 73 19 (!) 93/59 97 %   02/20/23 2007 98.1 °F (36.7 °C) 81 16 139/75 93 %   02/20/23 1616 98.1 °F (36.7 °C) 90 17 132/79 94 %       Oxygen Therapy  SpO2: 95 %  Pulse Oximeter Device Mode: Continuous  O2 Device: None (Room air)    Estimated body mass index is 30.02 kg/m² as calculated from the following:    Height as of this encounter: 5' 6\" (1.676 m). Weight as of this encounter: 186 lb (84.4 kg). Intake/Output Summary (Last 24 hours) at 2/21/2023 1442  Last data filed at 2/21/2023 0958  Gross per 24 hour   Intake 180 ml   Output --   Net 180 ml         Physical Exam:     Blood pressure 129/82, pulse 87, temperature 97.2 °F (36.2 °C), temperature source Oral, resp. rate 17, height 5' 6\" (1.676 m), weight 186 lb (84.4 kg), SpO2 95 %. General:    Well nourished. Head:  Normocephalic, atraumatic  Eyes:  Sclerae appear normal.  Pupils equally round. ENT:  Nares appear normal.  Moist oral mucosa  Neck:  No restricted ROM. Trachea midline   CV:   RRR. No m/r/g. No jugular venous distension. Lungs:   CTAB. No wheezing, rhonchi, or rales. Symmetric expansion. Abdomen:   Soft, nontender, nondistended. Extremities: No cyanosis or clubbing. No edema  Skin:     No rashes and normal coloration. Warm and dry. Neuro:  CN II-XII grossly intact. Psych:  Normal mood and affect.       I have personally reviewed labs and tests:  Recent Labs:  Recent Results (from the past 48 hour(s))   Anti-Xa, Unfractionated Heparin    Collection Time: 02/19/23  3:29 PM   Result Value Ref Range    Anti-XA Unfrac Heparin >1.1 (H) 0.3 - 0.7 IU/mL   APTT    Collection Time: 02/19/23  3:29 PM   Result Value Ref Range    .4 (HH) 24.5 - 34.2 SEC   APTT    Collection Time: 02/19/23  7:21 PM   Result Value Ref Range    .4 (H) 24.5 - 34.2 SEC   Anti-Xa, Unfractionated Heparin    Collection Time: 02/19/23  9:31 PM   Result Value Ref Range    Anti-XA Unfrac Heparin >1.1 (H) 0.3 - 0.7 IU/mL   Anti-Xa, Unfractionated Heparin    Collection Time: 02/20/23  3:28 AM   Result Value Ref Range    Anti-XA Unfrac Heparin >1.1 (H) 0.3 - 0.7 IU/mL   Basic Metabolic Panel w/ Reflex to MG    Collection Time: 02/20/23  3:28 AM   Result Value Ref Range    Sodium 140 133 - 143 mmol/L    Potassium 4.1 3.5 - 5.1 mmol/L    Chloride 107 101 - 110 mmol/L    CO2 27 21 - 32 mmol/L    Anion Gap 6 2 - 11 mmol/L    Glucose 100 65 - 100 mg/dL    BUN 22 8 - 23 MG/DL    Creatinine 0.95 0.8 - 1.5 MG/DL    Est, Glom Filt Rate >60 >60 ml/min/1.73m2    Calcium 8.9 8.3 - 10.4 MG/DL   CBC with Auto Differential    Collection Time: 02/20/23  3:28 AM   Result Value Ref Range    WBC 7.3 4.3 - 11.1 K/uL    RBC 3.63 (L) 4.23 - 5.6 M/uL    Hemoglobin 11.7 (L) 13.6 - 17.2 g/dL    Hematocrit 36.4 (L) 41.1 - 50.3 %    .3 82.0 - 102.0 FL    MCH 32.2 26.1 - 32.9 PG    MCHC 32.1 31.4 - 35.0 g/dL    RDW 13.7 11.9 - 14.6 %    Platelets 747 337 - 212 K/uL    MPV 9.1 (L) 9.4 - 12.3 FL    nRBC 0.00 0.0 - 0.2 K/uL    Differential Type AUTOMATED      Seg Neutrophils 62 43 - 78 %    Lymphocytes 22 13 - 44 %    Monocytes 10 4.0 - 12.0 %    Eosinophils % 5 0.5 - 7.8 %    Basophils 1 0.0 - 2.0 %    Immature Granulocytes 0 0.0 - 5.0 %    Segs Absolute 4.5 1.7 - 8.2 K/UL    Absolute Lymph # 1.6 0.5 - 4.6 K/UL    Absolute Mono # 0.7 0.1 - 1.3 K/UL    Absolute Eos # 0.4 0.0 - 0.8 K/UL    Basophils Absolute 0.1 0.0 - 0.2 K/UL    Absolute Immature Granulocyte 0.0 0.0 - 0.5 K/UL   APTT    Collection Time: 02/20/23  3:28 AM   Result Value Ref Range    .3 (H) 24.5 - 34.2 SEC   APTT    Collection Time: 02/20/23  9:26 AM   Result Value Ref Range    .5 (H) 24.5 - 34.2 SEC   APTT    Collection Time: 02/20/23  5:46 PM   Result Value Ref Range    .6 (H) 24.5 - 34.2 SEC   APTT    Collection Time: 02/20/23 11:56 PM   Result Value Ref Range    PTT 93.7 (H) 24.5 - 34.2 SEC   CBC    Collection Time: 02/21/23  5:39 AM   Result Value Ref Range    WBC 6.5 4.3 - 11.1 K/uL    RBC 3.55 (L) 4.23 - 5.6 M/uL    Hemoglobin 11.2 (L) 13.6 - 17.2 g/dL    Hematocrit 35.2 (L) 41.1 - 50.3 %    MCV 99.2 82.0 - 102.0 FL    MCH 31.5 26.1 - 32.9 PG    MCHC 31.8 31.4 - 35.0 g/dL    RDW 13.7 11.9 - 14.6 %    Platelets 220 118 - 991 K/uL    MPV 9.3 (L) 9.4 - 12.3 FL    nRBC 0.00 0.0 - 0.2 K/uL       I have personally reviewed imaging studies:  Other Studies:  Vascular duplex lower extremity venous bilateral   Final Result   1. No evidence of deep venous thrombosis in either lower extremity. CT CHEST PULMONARY EMBOLISM W CONTRAST   Final Result      XR CHEST PORTABLE   Final Result   Patchy airspace disease of the lung bases, left greater than right that either    represents pneumonia or atelectasis.        Marvin Hollis M.D.    2/19/2023 7:49:00 AM Current Meds:  Current Facility-Administered Medications   Medication Dose Route Frequency    heparin (porcine) injection 6,530 Units  80 Units/kg IntraVENous PRN    heparin (porcine) injection 3,260 Units  40 Units/kg IntraVENous PRN    heparin 25,000 units in dextrose 5% 250 mL (premix) infusion  5-30 Units/kg/hr IntraVENous Continuous    cefTRIAXone (ROCEPHIN) 1,000 mg in sodium chloride 0.9 % 50 mL IVPB (mini-bag)  1,000 mg IntraVENous Q24H    sodium chloride flush 0.9 % injection 5-40 mL  5-40 mL IntraVENous 2 times per day    sodium chloride flush 0.9 % injection 5-40 mL  5-40 mL IntraVENous PRN    0.9 % sodium chloride infusion   IntraVENous PRN    ondansetron (ZOFRAN-ODT) disintegrating tablet 4 mg  4 mg Oral Q8H PRN    Or    ondansetron (ZOFRAN) injection 4 mg  4 mg IntraVENous Q6H PRN    polyethylene glycol (GLYCOLAX) packet 17 g  17 g Oral Daily PRN    acetaminophen (TYLENOL) tablet 650 mg  650 mg Oral Q6H PRN    Or    acetaminophen (TYLENOL) suppository 650 mg  650 mg Rectal Q6H PRN    famotidine (PEPCID) tablet 20 mg  20 mg Oral BID    allopurinol (ZYLOPRIM) tablet 100 mg  100 mg Oral Daily    aspirin chewable tablet 81 mg  81 mg Oral Daily    atorvastatin (LIPITOR) tablet 10 mg  10 mg Oral Daily    losartan (COZAAR) tablet 50 mg  50 mg Oral Daily       Signed:  Kerrie Gongora, APRN - CNP    Notes, labs, VS, diagnostic testing reviewed  Case discussed with pt, care team, Dr. Sun Srinivasan

## 2023-02-21 NOTE — PROGRESS NOTES
[address] attempted to visit PT, but PT was asleep. [address] [place] [address] prayed silently for PT, PT's Family, and Staff. Rev. Mila Gross M.Div.

## 2023-02-21 NOTE — CONSULTS
PULMONARY/CRITICAL CARE CONSULT NOTE           2/21/2023    Josephann Epley                        Date of Admission:  2/19/2023    The patient's chart is reviewed and the patient is discussed with the staff. Subjective:     Patient is a 81 y.o.  male seen and evaluated at the request of Dr. Nathan Brown for the evaluation of pulmonary embolism. Saul Brown is a 81 y.o. male with medical history of recent PE  in 10/2022 on Eliquis , FLETCHER neoplasm ,patient if Dr Len Hannah and Dr. 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 He just had a PET/CT 2 weeks ago that showed resolution of the FDG uptake in the FLETCHER and evidence of partial FLETCHER obstruction, pulmonology at [facility] is following and was considering bronchoscopy but ultimately opted against this. He presented with tachycardia to the [facility] ED and was found to have a mural thrombus in the left upper lobe pulmonary artery on CT PE protocol. Today he reports he is doing well. He is on room air.  he is considering switching his care to [address] [place] [address]    Review of Systems: Comprehensive ROS negative except in HPI    Current Outpatient Medications   Medication Instructions    allopurinol (ZYLOPRIM) 100 mg, Oral, DAILY    apixaban (ELIQUIS) 5 mg, Oral, 2 TIMES DAILY    aspirin 81 mg, Oral, DAILY    atorvastatin (LIPITOR) 10 mg, Oral, DAILY    Cetirizine HCl (ZYRTEC ALLERGY) 10 MG CAPS Oral    losartan (COZAAR) 50 MG tablet Oral, DAILY    meclizine (ANTIVERT) 25 mg, Oral, DAILY PRN    METHOCARBAMOL PO Oral      Past Medical History:   Diagnosis Date    Hypercholesterolemia     Hypertension     Vertigo     Vertigo     Past Surgical History:   Procedure Laterality Date    APPENDECTOMY      GI  1964    Colon resection    NEUROLOGICAL SURGERY  1988    neck     Social History     Socioeconomic History    Marital status:      Spouse name: Not on file    Number of children: Not on file    Years of education: Not on file    Highest education level: Not on file   Occupational History    Not on file   Tobacco Use    Smoking status: Never    Smokeless tobacco: Never   Substance and Sexual Activity    Alcohol use: No    Drug use: No    Sexual activity: Not on file   Other Topics Concern    Not on file   Social History Narrative    Not on file     Social Determinants of Health     Financial Resource Strain: Unknown    Difficulty of Paying Living Expenses: Patient refused   Food Insecurity: Unknown    Worried About Running Out of Food in the Last Year: Patient refused    [address] in the Last Year: Patient refused   Transportation Needs: Unknown    Lack of Transportation (Medical): Not on file    Lack of Transportation (Non-Medical): Patient refused   Physical Activity: Not on file   Stress: Not on file   Social Connections: Not on file   Intimate Partner Violence: Not on file   Housing Stability: Unknown    Unable to Pay for Housing in the Last Year: Not on file    Number of [place] in the Last Year: Not on file    Unstable Housing in the Last Year: Patient refused     Family History   Problem Relation Age of Onset    Hypertension Father     Hypertension Mother     Osteoarthritis Mother     Stroke Father     Lupus Mother      Allergies   Allergen Reactions    Amlodipine Other (See Comments)     Other reaction(s): Other- (not listed) - Allergy  Shot bp up      Atorvastatin Other (See Comments)    Bupropion Other (See Comments)    Clonidine Other (See Comments)    Doxazosin Other (See Comments)    Hydrochlorothiazide Other (See Comments)    Lisinopril Other (See Comments)    Metoprolol Other (See Comments)    Simvastatin Other (See Comments)     Objective:   Blood pressure 108/66, pulse 69, temperature 97.5 °F (36.4 °C), temperature source Oral, resp. rate 18, height 5' 6\" (1.676 m), weight 186 lb (84.4 kg), SpO2 93 %.    Intake/Output Summary (Last 24 hours) at 2/21/2023 0820  Last data filed at 2/20/2023 1300  Gross per 24 hour   Intake 600 ml   Output --   Net 600 ml     PHYSICAL EXAM   Constitutional:  the patient is well developed and in no acute distress  EENMT:  Sclera clear, pupils equal, oral mucosa moist  Respiratory: symmetric chest rise. clear  Cardiovascular:  RRR without M,G,R. There is no lower extremity edema. Gastrointestinal: soft and non-tender; with positive bowel sounds. Musculoskeletal: warm without cyanosis. Normal muscle tone. Skin:  no jaundice or rashes, no wounds   Neurologic: symmetric strength, fluent speech  Psychiatric:  calm, appropriate, oriented x 4    Imaging: I performed an independent interpretation of the patient's images. CT Chest:                 Recent Labs     02/19/23  0706 02/19/23  0936 02/19/23  1118 02/20/23  0328 02/21/23  0539   WBC 7.8  --  8.0 7.3 6.5   HGB 13.3*  --  13.1* 11.7* 11.2*   HCT 41.9  --  40.6* 36.4* 35.2*     --  251 216 214   INR  --  1.3  --   --   --      --   --  140  --    K 3.9  --   --  4.1  --      --   --  107  --    CO2 25  --   --  27  --    BUN 20  --   --  22  --    CREATININE 1.03  --   --  0.95  --    MG 2.3  --   --   --   --    BILITOT 0.6  --   --   --   --    AST 20  --   --   --   --    ALT 22  --   --   --   --    ALKPHOS 88  --   --   --   --    TROPHS 9.0 9.9  --   --   --    NTPROBNP 132  --   --   --   --      ECHO: No results found for this or any previous visit. MICRO: No results for input(s): CULTURE in the last 72 hours. Assessment and Plan:  (Medical Decision Making)       Patient is 81years old male with recent diagnosis of lung cancer who has been followed by [facility]. He also has pulmonary embolism in October 2022 and has been on Eliquis since then. He presented with new thrombus in left upper lobe pulmonary artery.   He also has been followed by pulmonary over [facility] and there is a concern for endobronchial lesion and bronchoscopy was considered by [facility] however patient said he would not want to have bronchoscopy unless really needed.  -We can discuss this more with him to see if he would be willing to have it done here or whether he wants to follow-up with [facility]  -He has new PE and anticoagulation therapy was already addressed by Dr. Mike Ornelas and the plan are currently to change his Eliquis to Pradaxa        Full Code    More than 50% of the time documented was spent in face-to-face contact with the patient and in the care of the patient on the floor/unit where the patient is located. Thank you very much for this referral.  We appreciate the opportunity to participate in this patient's care. Will follow along with above stated plan.     Pradeep Dumont MD

## 2023-02-21 NOTE — CONSULTS
[employer] [facility]: Inpatient Hematology / Oncology Consult Note    Reason for Consult:  PE, Eliquis failure? Referring Physician:  Musa Oconnor DO    History of Present Illness:  Mr. Filippo Petersen is a 81 y.o. male admitted on 2/19/2023. The primary encounter diagnosis was Pulmonary embolism without acute cor pulmonale, unspecified chronicity, unspecified pulmonary embolism type ([address] A diagnosis of Pneumonia of left upper lobe due to infectious organism was also pertinent to this visit. He has a history of PE in October 2022 and was on Eliquis prior to admission, he is also a patient of Dr. Eloise Caba and Dr. 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 He just had a PET/CT 2 weeks ago that showed resolution of the FDG uptake in the FLETCHER, but evidence of partial FLETCHER obstruction, pulmonology at [facility] is following and was considering bronchoscopy but ultimately opted against this. He presented with tachycardia to the [facility] ED and was found to have a mural thrombus in the left upper lobe pulmonary artery on CT PE protocol. Hematology is now consulted for recommendations. He is feeling well, not currently on oxygen or short of breath. Tolerating UFH without issue. Review of Systems:  ROS:  Constitutional: Negative for fever, chills, weakness, malaise, fatigue. CV: Negative for chest pain, palpitations, edema. Respiratory: Negative for dyspnea, cough, wheezing. GI: Negative for nausea, abdominal pain, diarrhea. Allergies   Allergen Reactions    Amlodipine Other (See Comments)     Other reaction(s):  Other- (not listed) - Allergy  Shot bp up      Atorvastatin Other (See Comments)    Bupropion Other (See Comments)    Clonidine Other (See Comments)    Doxazosin Other (See Comments)    Hydrochlorothiazide Other (See Comments)    Lisinopril Other (See Comments)    Metoprolol Other (See Comments)    Simvastatin Other (See Comments)     Past Medical History:   Diagnosis Date    Hypercholesterolemia     Hypertension     Vertigo     Vertigo     Past Surgical History:   Procedure Laterality Date    APPENDECTOMY      GI  1964    Colon resection    NEUROLOGICAL SURGERY  1988    neck     Family History   Problem Relation Age of Onset    Hypertension Father     Hypertension Mother     Osteoarthritis Mother     Stroke Father     Lupus Mother      Social History     Socioeconomic History    Marital status:      Spouse name: Not on file    Number of children: Not on file    Years of education: Not on file    Highest education level: Not on file   Occupational History    Not on file   Tobacco Use    Smoking status: Never    Smokeless tobacco: Never   Substance and Sexual Activity    Alcohol use: No    Drug use: No    Sexual activity: Not on file   Other Topics Concern    Not on file   Social History Narrative    Not on file     Social Determinants of Health     Financial Resource Strain: Unknown    Difficulty of Paying Living Expenses: Patient refused   Food Insecurity: Unknown    Worried About Running Out of Food in the Last Year: Patient refused    [address] in the Last Year: Patient refused   Transportation Needs: Unknown    Lack of Transportation (Medical):  Not on file    Lack of Transportation (Non-Medical): Patient refused   Physical Activity: Not on file   Stress: Not on file   Social Connections: Not on file   Intimate Partner Violence: Not on file   Housing Stability: Unknown    Unable to Pay for Housing in the Last Year: Not on file    Number of [place] in the Last Year: Not on file    Unstable Housing in the Last Year: Patient refused     Current Facility-Administered Medications   Medication Dose Route Frequency Provider Last Rate Last Admin    heparin (porcine) injection 6,530 Units  80 Units/kg IntraVENous PRN KALIE De Leon CNP        heparin (porcine) injection 3,260 Units  40 Units/kg IntraVENous PRN Jluis Johnson, APRN - CNP        heparin 25,000 units in dextrose 5% 250 mL (premix) infusion  5-30 Units/kg/hr IntraVENous Continuous Jluis Johnson, APRN - CNP 10.6 mL/hr at 02/20/23 1130 13 Units/kg/hr at 02/20/23 1130    cefTRIAXone (ROCEPHIN) 1,000 mg in sodium chloride 0.9 % 50 mL IVPB (mini-bag)  1,000 mg IntraVENous Q24H Jluis Johsnon, APRN - CNP   Stopped at 02/20/23 0921    azithromycin (ZITHROMAX) 500 mg in sodium chloride 0.9 % 250 mL IVPB ([id number])  500 mg IntraVENous Q24H Jluis Johnson, APRN - CNP   Stopped at 02/20/23 1105    sodium chloride flush 0.9 % injection 5-40 mL  5-40 mL IntraVENous 2 times per day Jluis Johnson, APRN - CNP   10 mL at 02/20/23 2033    sodium chloride flush 0.9 % injection 5-40 mL  5-40 mL IntraVENous PRN Jluis Johnson, APRN - CNP        0.9 % sodium chloride infusion   IntraVENous PRN Jluis Johnson, APRN - CNP 25 mL/hr at 02/20/23 0854 New Bag at 02/20/23 0854    ondansetron (ZOFRAN-ODT) disintegrating tablet 4 mg  4 mg Oral Q8H PRN Jluis Johnson, APRN - CNP        Or    ondansetron [facility]) injection 4 mg  4 mg IntraVENous Q6H PRN Jluis Johnson, APRN - CNP        polyethylene glycol (GLYCOLAX) packet 17 g  17 g Oral Daily PRN Jluis Johnson, APRN - CNP        acetaminophen (TYLENOL) tablet 650 mg  650 mg Oral Q6H PRN Jluis Johnson, APRN - CNP        Or    acetaminophen (TYLENOL) suppository 650 mg  650 mg Rectal Q6H PRN Jluis Johnson, APRN - CNP        famotidine (PEPCID) tablet 20 mg  20 mg Oral BID Jluis Johnson, APRN - CNP   20 mg at 02/20/23 2033    allopurinol (ZYLOPRIM) tablet 100 mg  100 mg Oral Daily Jluis Johnson, APRN - CNP   100 mg at 02/20/23 0982    aspirin chewable tablet 81 mg  81 mg Oral Daily Jluis Johnson, APRN - CNP   81 mg at 02/20/23 0840    atorvastatin (LIPITOR) tablet 10 mg  10 mg Oral Daily Jluis Johnson, APRN - CNP   10 mg at 02/20/23 0840    losartan (COZAAR) tablet 50 mg  50 mg Oral Daily Jluis Johnson, APRN - CNP   50 mg at 02/20/23 0840       OBJECTIVE:  Patient Vitals for the past 8 hrs:   BP Temp Temp src Pulse Resp SpO2 Weight   23 139/75 98.1 °F (36.7 °C) -- 81 -- 93 % --   23 1811 -- -- -- -- -- -- 186 lb (84.4 kg)   23 1616 132/79 98.1 °F (36.7 °C) Oral 90 17 94 % --     Temp (24hrs), Av.7 °F (36.5 °C), Min:97.3 °F (36.3 °C), Max:98.1 °F (36.7 °C)    No intake/output data recorded. Physical Exam:  Constitutional: Well developed, well nourished male in no acute distress, sitting comfortably in the hospital bed. HEENT: Normocephalic and atraumatic. Sclerae anicteric. Neck supple without JVD. No thyromegaly present. Lymph node   No palpable submandibular, cervical, supraclavicular lymph nodes. Skin Warm and dry. No bruising and no rash noted. No erythema. No pallor. Respiratory Lungs are clear to auscultation bilaterally without wheezes, rales or rhonchi, normal air exchange without accessory muscle use. CVS Normal rate, regular rhythm and normal S1 and S2. No murmurs, gallops, or rubs. Abdomen Soft, nontender and nondistended, normoactive bowel sounds. No palpable mass. No hepatosplenomegaly. Neuro Grossly nonfocal with no obvious sensory or motor deficits. MSK Normal range of motion in general.  No edema and no tenderness. Psych Appropriate mood and affect.       Labs:    Recent Results (from the past 24 hour(s))   Anti-Xa, Unfractionated Heparin    Collection Time: 23  9:31 PM   Result Value Ref Range    Anti-XA Unfrac Heparin >1.1 (H) 0.3 - 0.7 IU/mL   Anti-Xa, Unfractionated Heparin    Collection Time: 23  3:28 AM   Result Value Ref Range    Anti-XA Unfrac Heparin >1.1 (H) 0.3 - 0.7 IU/mL   Basic Metabolic Panel w/ Reflex to MG    Collection Time: 23  3:28 AM   Result Value Ref Range    Sodium 140 133 - 143 mmol/L    Potassium 4.1 3.5 - 5.1 mmol/L    Chloride 107 101 - 110 mmol/L    CO2 27 21 - 32 mmol/L    Anion Gap 6 2 - 11 mmol/L    Glucose 100 65 - 100 mg/dL    BUN 22 8 - 23 MG/DL    Creatinine 0.95 0.8 - 1.5 MG/DL    Est, Glom Filt Rate >60 >60 ml/min/1.73m2    Calcium 8.9 8.3 - 10.4 MG/DL   CBC with Auto Differential    Collection Time: 02/20/23  3:28 AM   Result Value Ref Range    WBC 7.3 4.3 - 11.1 K/uL    RBC 3.63 (L) 4.23 - 5.6 M/uL    Hemoglobin 11.7 (L) 13.6 - 17.2 g/dL    Hematocrit 36.4 (L) 41.1 - 50.3 %    .3 82.0 - 102.0 FL    MCH 32.2 26.1 - 32.9 PG    MCHC 32.1 31.4 - 35.0 g/dL    RDW 13.7 11.9 - 14.6 %    Platelets 187 432 - 109 K/uL    MPV 9.1 (L) 9.4 - 12.3 FL    nRBC 0.00 0.0 - 0.2 K/uL    Differential Type AUTOMATED      Seg Neutrophils 62 43 - 78 %    Lymphocytes 22 13 - 44 %    Monocytes 10 4.0 - 12.0 %    Eosinophils % 5 0.5 - 7.8 %    Basophils 1 0.0 - 2.0 %    Immature Granulocytes 0 0.0 - 5.0 %    Segs Absolute 4.5 1.7 - 8.2 K/UL    Absolute Lymph # 1.6 0.5 - 4.6 K/UL    Absolute Mono # 0.7 0.1 - 1.3 K/UL    Absolute Eos # 0.4 0.0 - 0.8 K/UL    Basophils Absolute 0.1 0.0 - 0.2 K/UL    Absolute Immature Granulocyte 0.0 0.0 - 0.5 K/UL   APTT    Collection Time: 02/20/23  3:28 AM   Result Value Ref Range    .3 (H) 24.5 - 34.2 SEC   APTT    Collection Time: 02/20/23  9:26 AM   Result Value Ref Range    .5 (H) 24.5 - 34.2 SEC   APTT    Collection Time: 02/20/23  5:46 PM   Result Value Ref Range    .6 (H) 24.5 - 34.2 SEC       Imaging:  XR CHEST PORTABLE    Result Date: 2/19/2023  EXAMINATION: XR CHEST PORTABLE  DATE OF EXAM: 2/19/2023 7:18 AM HISTORY: Chest Pain COMPARISON: None. FINDINGS: Underinflated examination with some airspace disease/atelectasis of the lung bases, left greater than right that could be pneumonia or atelectasis. Mild cardiomegaly that may just be secondary to underinflation. Bones and upper abdomen are normal.     Patchy airspace disease of the lung bases, left greater than right that either represents pneumonia or atelectasis.   Jean Pierre Myles M.D. 2/19/2023 7:49:00 AM    CT CHEST PULMONARY EMBOLISM W CONTRAST    Result Date: 2/19/2023  HISTORY: Shortness of breath, tachycardia Exam: CT chest, PE protocol Technique: Thin section axial CT images are obtained from the thoracic inlet through the upper abdomen. Coronal reformatted images are obtained based on the axial data. 100 cc Isovue 370 is administered intraveneously without incident. Radiation dose reduction techniques were used for this study. Our CT scanners use one or all of the following: Automated exposure control, adjustment of the mA and/or kV according to patient size, use of iterative reconstruction. Comparison: 10/20/2022 Findings: There is a left upper lobe pneumonia. There is a mural thrombus within the left upper lobe pulmonary artery. There is a trace left pleural effusion. There is no mediastinal, hilar, or axillary lymphadenopathy seen. No suspicious pulmonary nodules. No pericardial effusions. Calcified pleural plaque present at the right lung base. There is bibasilar scarring present. There is a hiatal hernia, stable in appearance. Evaluation of the upper abdomen demonstrates a stable right adrenal nodule with findings compatible with adrenal adenoma. Bone window evaluation demonstrates no aggressive osseous lesions. IMPRESSIONS: 1. Mural thrombus within the left upper lobe pulmonary artery. 2. Left upper lobe pneumonia. 3. Bibasilar scarring. [facility] The critical results contained in this report were communicated to Dr. Behzad Donovan by myself on 2/19/2023 at 8:57 AM. Critical results were communicated as outlined in Section II.C.2.a.i of the ACR Practice Guideline for Communication of Diagnostic Imaging Findings. Vascular duplex lower extremity venous bilateral    Result Date: 2/19/2023  EXAM: Bilateral  lower extremity venous ultrasound. INDICATION:  Pain and swelling. Comparison: None. Doppler ultrasound of both lower extremities was performed. FINDINGS: There is normal flow in the common femoral, superficial femoral, and popliteal veins.  Normal compression and augmentation is demonstrated. The proximal calf veins are also patent. 1.  No evidence of deep venous thrombosis in either lower extremity. 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 *      PLAN / RECOMMENDATIONS:  PE  Noted on CT PE protocol, mural thrombus in FLETCHER pulmonary artery, new since last fall  Previously on Eliquis with excellent compliance  Now on UFH with good tolerance  Although rare, this appears to be a true Eliquis failure, and we should consider alternative anticoagulant therapy. Ideally this would be low molecular weight heparin, but this presents significant logistical and financial challenges. Warfarin is also a challenge from a monitoring standpoint and is less preferred in malignancy associated VTE. Xarelto has the same mechanism as Eliquis and would not be much of a change. The other potential option with less logistical challenges or risk would be Pradaxa, the mechanism of action is different (direct thrombin inhibitor vs Xa inhibitor) and it is also convenient dosing, it has been looked at in malignancy associated VTE and seems to have similar efficacy to DOACs. If this can be obtained I would prefer this as an approach.     History of lung cancer  Recent PET/CT showed no evidence of residual hypermetabolism and certainly no evidence of distant disease, there was some concern about possible endobronchial lesion but this would require direct visualization with FOB - defer to pulmonary regarding urgency of this (he has seen [facility] and was planned for close follow-up with them)      Lab studies and imaging studies were personally reviewed. Pertinent old records were reviewed. Thank you for allowing me to participate in the care of Mr. Alvin Gonzales. He should follow-up with Dr. Sarath Ortiz and Dr. Guerita Leigh for ongoing hematology/oncology care.           [facility]  [address] [place] [address]  Office : [phone]  Fax : [phone]

## 2023-02-22 VITALS
DIASTOLIC BLOOD PRESSURE: 59 MMHG | HEART RATE: 100 BPM | TEMPERATURE: 97.3 F | RESPIRATION RATE: 17 BRPM | WEIGHT: 186 LBS | SYSTOLIC BLOOD PRESSURE: 111 MMHG | BODY MASS INDEX: 29.89 KG/M2 | HEIGHT: 66 IN | OXYGEN SATURATION: 94 %

## 2023-02-22 LAB
ALBUMIN SERPL-MCNC: 2.7 G/DL (ref 3.2–4.6)
ALBUMIN/GLOB SERPL: 0.7 (ref 0.4–1.6)
ALP SERPL-CCNC: 80 U/L (ref 50–136)
ALT SERPL-CCNC: 25 U/L (ref 12–65)
ANION GAP SERPL CALC-SCNC: 6 MMOL/L (ref 2–11)
APTT PPP: 80.2 SEC (ref 24.5–34.2)
AST SERPL-CCNC: 24 U/L (ref 15–37)
BILIRUB SERPL-MCNC: 0.4 MG/DL (ref 0.2–1.1)
BUN SERPL-MCNC: 17 MG/DL (ref 8–23)
CALCIUM SERPL-MCNC: 9.2 MG/DL (ref 8.3–10.4)
CHLORIDE SERPL-SCNC: 107 MMOL/L (ref 101–110)
CO2 SERPL-SCNC: 27 MMOL/L (ref 21–32)
CREAT SERPL-MCNC: 0.93 MG/DL (ref 0.8–1.5)
ERYTHROCYTE [DISTWIDTH] IN BLOOD BY AUTOMATED COUNT: 13.4 % (ref 11.9–14.6)
GLOBULIN SER CALC-MCNC: 3.7 G/DL (ref 2.8–4.5)
GLUCOSE SERPL-MCNC: 97 MG/DL (ref 65–100)
HCT VFR BLD AUTO: 34.3 % (ref 41.1–50.3)
HGB BLD-MCNC: 11 G/DL (ref 13.6–17.2)
MCH RBC QN AUTO: 31.9 PG (ref 26.1–32.9)
MCHC RBC AUTO-ENTMCNC: 32.1 G/DL (ref 31.4–35)
MCV RBC AUTO: 99.4 FL (ref 82–102)
NRBC # BLD: 0 K/UL (ref 0–0.2)
PLATELET # BLD AUTO: 204 K/UL (ref 150–450)
PMV BLD AUTO: 9.4 FL (ref 9.4–12.3)
POTASSIUM SERPL-SCNC: 3.7 MMOL/L (ref 3.5–5.1)
PROT SERPL-MCNC: 6.4 G/DL (ref 6.3–8.2)
RBC # BLD AUTO: 3.45 M/UL (ref 4.23–5.6)
SODIUM SERPL-SCNC: 140 MMOL/L (ref 133–143)
WBC # BLD AUTO: 6.3 K/UL (ref 4.3–11.1)

## 2023-02-22 PROCEDURE — 85027 COMPLETE CBC AUTOMATED: CPT

## 2023-02-22 PROCEDURE — 99232 SBSQ HOSP IP/OBS MODERATE 35: CPT | Performed by: INTERNAL MEDICINE

## 2023-02-22 PROCEDURE — 6370000000 HC RX 637 (ALT 250 FOR IP): Performed by: NURSE PRACTITIONER

## 2023-02-22 PROCEDURE — 80053 COMPREHEN METABOLIC PANEL: CPT

## 2023-02-22 PROCEDURE — 6360000002 HC RX W HCPCS: Performed by: NURSE PRACTITIONER

## 2023-02-22 PROCEDURE — 85730 THROMBOPLASTIN TIME PARTIAL: CPT

## 2023-02-22 PROCEDURE — 36415 COLL VENOUS BLD VENIPUNCTURE: CPT

## 2023-02-22 PROCEDURE — 2580000003 HC RX 258: Performed by: NURSE PRACTITIONER

## 2023-02-22 RX ORDER — FAMOTIDINE 20 MG/1
20 TABLET, FILM COATED ORAL 2 TIMES DAILY
Qty: 60 TABLET | Refills: 0 | Status: SHIPPED | OUTPATIENT
Start: 2023-02-22

## 2023-02-22 RX ORDER — DABIGATRAN ETEXILATE 150 MG/1
150 CAPSULE ORAL 2 TIMES DAILY
Qty: 60 CAPSULE | Refills: 0 | Status: SHIPPED | OUTPATIENT
Start: 2023-02-22

## 2023-02-22 RX ORDER — FAMOTIDINE 20 MG/1
20 TABLET, FILM COATED ORAL 2 TIMES DAILY
Qty: 60 TABLET | Refills: 0 | Status: SHIPPED | OUTPATIENT
Start: 2023-02-22 | End: 2023-02-22 | Stop reason: SDUPTHER

## 2023-02-22 RX ORDER — DABIGATRAN ETEXILATE 150 MG/1
150 CAPSULE ORAL 2 TIMES DAILY
Status: DISCONTINUED | OUTPATIENT
Start: 2023-02-22 | End: 2023-02-22 | Stop reason: HOSPADM

## 2023-02-22 RX ORDER — DABIGATRAN ETEXILATE 150 MG/1
150 CAPSULE ORAL 2 TIMES DAILY
Qty: 60 CAPSULE | Refills: 0 | Status: SHIPPED | OUTPATIENT
Start: 2023-02-22 | End: 2023-02-22 | Stop reason: SDUPTHER

## 2023-02-22 RX ADMIN — ALLOPURINOL 100 MG: 100 TABLET ORAL at 08:22

## 2023-02-22 RX ADMIN — CEFTRIAXONE 1000 MG: 1 INJECTION, POWDER, FOR SOLUTION INTRAMUSCULAR; INTRAVENOUS at 08:17

## 2023-02-22 RX ADMIN — DABIGATRAN ETEXILATE MESYLATE 150 MG: 150 CAPSULE ORAL at 10:12

## 2023-02-22 RX ADMIN — HEPARIN SODIUM 13 UNITS/KG/HR: 10000 INJECTION, SOLUTION INTRAVENOUS at 04:45

## 2023-02-22 RX ADMIN — LOSARTAN POTASSIUM 50 MG: 50 TABLET, FILM COATED ORAL at 08:22

## 2023-02-22 RX ADMIN — ASPIRIN 81 MG: 81 TABLET, CHEWABLE ORAL at 08:22

## 2023-02-22 RX ADMIN — ATORVASTATIN CALCIUM 10 MG: 10 TABLET, FILM COATED ORAL at 08:22

## 2023-02-22 RX ADMIN — FAMOTIDINE 20 MG: 20 TABLET, FILM COATED ORAL at 08:17

## 2023-02-22 RX ADMIN — SODIUM CHLORIDE, PRESERVATIVE FREE 10 ML: 5 INJECTION INTRAVENOUS at 08:18

## 2023-02-22 ASSESSMENT — PAIN SCALES - GENERAL
PAINLEVEL_OUTOF10: 0
PAINLEVEL_OUTOF10: 0

## 2023-02-22 NOTE — DISCHARGE SUMMARY
Hospitalist Discharge Summary   Admit Date:  2023  6:57 AM   DC Note date: 2023  Name:  Lilibeth Parsons   Age:  81 y.o. Sex:  male  :  1935   MRN:  [id number]   Room:  [facility]  PCP:  Sharda Agarwal MD    Presenting Complaint: Tachycardia     Initial Admission Diagnosis: Pneumonia of left upper lobe due to infectious organism [J18.9]  Pulmonary embolism without acute cor pulmonale, unspecified chronicity, unspecified pulmonary embolism type (HCC) [I26.99]  Acute pulmonary embolism, unspecified pulmonary embolism type, unspecified whether acute cor pulmonale present ([address] [I26.99]     Problem List for this Hospitalization (present on admission):    Principal Problem:    Acute pulmonary embolism, unspecified pulmonary embolism type, unspecified whether acute cor pulmonale present ([address]  Active Problems:    Pulmonary embolism without acute cor pulmonale ([address]    Essential hypertension    GERD (gastroesophageal reflux disease)    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 *      Hospital Course:  Lilibeth Parsons is a 81 y.o. male with medical history of PE on Eliquis, FLETCHER neoplasm currently in remission, HLD, HTN who presented with elevated heart rate noted on home monitor. CT chested found left upper lobe pneumonia and mural thrombus within the left upper lobe pulmonary artery. He was started on heparin gtt. On RA. Received Rocephin/azithromycin in ED. Is followed at [facility].   had CT chest with Pulmonary at [facility] Dr. Ren Brown, findings of left upper lobe inferior consolidation vs pneumonia vs tumor invasion. Plans to repeat CT chest in 4 weeks. Hematology and Pulmonary consulted. 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 Hematology recommended pradaxa for life. Denies CP, SOB, n/v/d, abd pain. Disposition: home  Diet: ADULT DIET;  Regular  Code Status: Full Code    Follow Ups:      Time spent in patient discharge and coordination 45 minutes. Follow up labs/diagnostics (ultimately defer to outpatient provider):      Plan was discussed with pt. All questions answered. Patient was stable at time of discharge. Instructions given to call a physician or return if any concerns. Current Discharge Medication List        START taking these medications    Details   dabigatran (PRADAXA) 150 MG capsule Take 1 capsule by mouth 2 times daily  Qty: 60 capsule, Refills: 0      famotidine (PEPCID) 20 MG tablet Take 1 tablet by mouth 2 times daily  Qty: 60 tablet, Refills: 0           CONTINUE these medications which have NOT CHANGED    Details   allopurinol (ZYLOPRIM) 100 MG tablet Take 1 tablet by mouth daily  Qty: 90 tablet, Refills: 3      meclizine (ANTIVERT) 25 MG tablet Take 1 tablet by mouth daily as needed for Dizziness  Qty: 30 tablet, Refills: 0      aspirin 81 MG chewable tablet Take 81 mg by mouth daily      atorvastatin (LIPITOR) 20 MG tablet Take 10 mg by mouth daily      Cetirizine HCl (ZYRTEC ALLERGY) 10 MG CAPS Take by mouth      losartan (COZAAR) 50 MG tablet Take by mouth daily           STOP taking these medications       METHOCARBAMOL PO Comments:   Reason for Stopping:         apixaban (ELIQUIS) 5 MG TABS tablet Comments:   Reason for Stopping:               Some medications may have been reported old/obsolete and marked \"stop taking\" by the system; in reality pt was already off these meds; defer to outpatient or prescribing providers. Procedures done this admission:  * No surgery found *    Consults this admission:  IP CONSULT TO PULMONOLOGY  IP CONSULT TO HEMATOLOGY    Echocardiogram results:  No results found for this or any previous visit. Diagnostic Imaging/Tests:   XR CHEST PORTABLE    Result Date: 2/19/2023  Patchy airspace disease of the lung bases, left greater than right that either represents pneumonia or atelectasis.   Emmanuelle Charles Naomi Gordon M.D. 2/19/2023 7:49:00 AM    Vascular duplex lower extremity venous bilateral    Result Date: 2/19/2023  1. No evidence of deep venous thrombosis in either lower extremity.         Labs: Results:       BMP, Mg, Phos Recent Labs     02/20/23 0328 02/22/23  0539    140   K 4.1 3.7    107   CO2 27 27   ANIONGAP 6 6   BUN 22 17   CREATININE 0.95 0.93   LABGLOM >60 >60   CALCIUM 8.9 9.2   GLUCOSE 100 97      CBC Recent Labs     02/20/23 0328 02/21/23  0539 02/22/23  0539   WBC 7.3 6.5 6.3   RBC 3.63* 3.55* 3.45*   HGB 11.7* 11.2* 11.0*   HCT 36.4* 35.2* 34.3*   .3 99.2 99.4   MCH 32.2 31.5 31.9   MCHC 32.1 31.8 32.1   RDW 13.7 13.7 13.4    214 204   MPV 9.1* 9.3* 9.4   NRBC 0.00 0.00 0.00   SEGS 62  --   --    LYMPHOPCT 22  --   --    EOSRELPCT 5  --   --    MONOPCT 10  --   --    BASOPCT 1  --   --    IMMGRAN 0  --   --    SEGSABS 4.5  --   --    LYMPHSABS 1.6  --   --    EOSABS 0.4  --   --    MONOSABS 0.7  --   --    BASOSABS 0.1  --   --    ABSIMMGRAN 0.0  --   --       LFT Recent Labs     02/22/23  0539   BILITOT 0.4   ALKPHOS 80   AST 24   ALT 25   PROT 6.4   LABALBU 2.7*   GLOB 3.7      Cardiac  Lab Results   Component Value Date/Time    NTPROBNP 132 02/19/2023 07:06 AM    TROPHS 9.9 02/19/2023 09:36 AM    TROPHS 9.0 02/19/2023 07:06 AM    TROPHS 9.2 10/20/2022 11:40 PM      Coags Lab Results   Component Value Date/Time    PROTIME 16.2 02/19/2023 09:36 AM    INR 1.3 02/19/2023 09:36 AM    APTT 80.2 02/22/2023 05:35 AM    APTT 93.7 02/20/2023 11:56 PM    APTT 111.6 02/20/2023 05:46 PM      A1c No results found for: LABA1C, EAG   Lipids No results found for: CHOL, LDLCALC, LABVLDL, HDL, CHOLHDLRATIO, TRIG   Thyroid  Lab Results   Component Value Date/Time    TSHELE 2.95 02/19/2023 07:06 AM    PBU3IJW 2.750 02/13/2023 03:08 PM        Most Recent UA Lab Results   Component Value Date/Time    COLORU YELLOW/STRAW 10/07/2022 12:11 AM    APPEARANCE CLEAR 10/07/2022 12:11 AM    SPECGRAV 1.013 10/07/2022 12:11 AM    LABPH 5.0 10/07/2022 12:11 AM    PROTEINU Negative 10/07/2022 12:11 AM    GLUCOSEU Negative 10/07/2022 12:11 AM    KETUA Negative 10/07/2022 12:11 AM    BILIRUBINUR Negative 10/07/2022 12:11 AM    BLOODU Negative 10/07/2022 12:11 AM    UROBILINOGEN 1.0 10/07/2022 12:11 AM    NITRU Negative 10/07/2022 12:11 AM    LEUKOCYTESUR TRACE 10/07/2022 12:11 AM    WBCUA 0-4 10/07/2022 12:11 AM    RBCUA 0-5 10/07/2022 12:11 AM    EPITHUA 0-5 10/07/2022 12:11 AM    BACTERIA Negative 10/07/2022 12:11 AM    LABCAST 0-2 10/07/2022 12:11 AM        No results for input(s): CULTURE in the last 720 hours.     All Labs from Last 24 Hrs:  Recent Results (from the past 24 hour(s))   APTT    Collection Time: 02/22/23  5:35 AM   Result Value Ref Range    PTT 80.2 (H) 24.5 - 34.2 SEC   CBC    Collection Time: 02/22/23  5:39 AM   Result Value Ref Range    WBC 6.3 4.3 - 11.1 K/uL    RBC 3.45 (L) 4.23 - 5.6 M/uL    Hemoglobin 11.0 (L) 13.6 - 17.2 g/dL    Hematocrit 34.3 (L) 41.1 - 50.3 %    MCV 99.4 82.0 - 102.0 FL    MCH 31.9 26.1 - 32.9 PG    MCHC 32.1 31.4 - 35.0 g/dL    RDW 13.4 11.9 - 14.6 %    Platelets 858 528 - 866 K/uL    MPV 9.4 9.4 - 12.3 FL    nRBC 0.00 0.0 - 0.2 K/uL   Comprehensive Metabolic Panel w/ Reflex to MG    Collection Time: 02/22/23  5:39 AM   Result Value Ref Range    Sodium 140 133 - 143 mmol/L    Potassium 3.7 3.5 - 5.1 mmol/L    Chloride 107 101 - 110 mmol/L    CO2 27 21 - 32 mmol/L    Anion Gap 6 2 - 11 mmol/L    Glucose 97 65 - 100 mg/dL    BUN 17 8 - 23 MG/DL    Creatinine 0.93 0.8 - 1.5 MG/DL    Est, Glom Filt Rate >60 >60 ml/min/1.73m2    Calcium 9.2 8.3 - 10.4 MG/DL    Total Bilirubin 0.4 0.2 - 1.1 MG/DL    ALT 25 12 - 65 U/L    AST 24 15 - 37 U/L    Alk Phosphatase 80 50 - 136 U/L    Total Protein 6.4 6.3 - 8.2 g/dL    Albumin 2.7 (L) 3.2 - 4.6 g/dL    Globulin 3.7 2.8 - 4.5 g/dL    Albumin/Globulin Ratio 0.7 0.4 - 1.6         Allergies   Allergen Reactions    Amlodipine Other (See Comments)     Other reaction(s): Other- (not listed) - Allergy  Shot bp up      Atorvastatin Other (See Comments)    Bupropion Other (See Comments)    Clonidine Other (See Comments)    Doxazosin Other (See Comments)    Hydrochlorothiazide Other (See Comments)    Lisinopril Other (See Comments)    Metoprolol Other (See Comments)    Simvastatin Other (See Comments)     Immunization History   Administered Date(s) Administered    Influenza Virus Vaccine 10/05/2020    Influenza, Triv, inactivated, subunit, adjuvanted, IM (Fluad 65 yrs and older) 11/20/2017, 09/19/2018, 10/24/2019    PPD Test 05/11/2020       Recent Vital Data:  Patient Vitals for the past 24 hrs:   Temp Pulse Resp BP SpO2   02/22/23 0756 97.5 °F (36.4 °C) 88 16 137/84 90 %   02/22/23 0341 97.3 °F (36.3 °C) 72 18 118/83 91 %   02/21/23 2342 97.3 °F (36.3 °C) 65 18 124/78 93 %   02/21/23 2021 97.7 °F (36.5 °C) 72 18 130/81 95 %   02/21/23 1605 97.3 °F (36.3 °C) 80 18 (!) 150/86 95 %       Oxygen Therapy  SpO2: 90 %  Pulse Oximeter Device Mode: Continuous  O2 Device: None (Room air)    Estimated body mass index is 30.02 kg/m² as calculated from the following:    Height as of this encounter: 5' 6\" (1.676 m). Weight as of this encounter: 186 lb (84.4 kg). Intake/Output Summary (Last 24 hours) at 2/22/2023 1205  Last data filed at 2/21/2023 1834  Gross per 24 hour   Intake 360 ml   Output --   Net 360 ml         Physical Exam:    General:    Well nourished. No overt distress  Head:  Normocephalic, atraumatic  Eyes:  Sclerae appear normal.  Pupils equally round. HENT:  Nares appear normal, no drainage. Moist mucous membranes  Neck:  No restricted ROM. Trachea midline  CV:   RRR. No m/r/g. No JVD  Lungs:   CTAB. No wheezing, rhonchi, or rales. Respirations even, unlabored  Abdomen:   Soft, nontender, nondistended. Extremities: Warm and dry. No cyanosis or clubbing. No edema. Skin:     No rashes.   Normal coloration  Neuro:  CN II-XII grossly intact. Psych:  Normal mood and affect.     Signed:  KALIE Chanel - CNP    Notes, labs, VS, diagnostic testing reviewed  Case discussed with pt, care team, Dr. Brenda Romano

## 2023-02-22 NOTE — CARE COORDINATION
02/22/23 1330   Service Assessment   Patient Orientation Alert and Oriented   Cognition Alert   History Provided By Patient   Primary Caregiver Self   Support Systems Spouse/Significant Other   PCP Verified by CM Yes   Prior Functional Level Independent in ADLs/IADLs   Current Functional Level Independent in ADLs/IADLs   Can patient return to prior living arrangement Yes   Ability to make needs known: Good   Family able to assist with home care needs: Yes   Would you like for me to discuss the discharge plan with any other family members/significant others, and if so, who? Yes   Financial Resources Medicare; ([place]);Other (Comment)  (BCBS)   [employer]   (He was provided with a coupon for Pradaxa)   Social/Functional History   Lives With Spouse   Type of [address] Rollator;[place], standard   ADL Assistance Independent   Mode of Transportation Car   Discharge Planning   Type of [place] Spouse/Significant Other   [facility] To [facility]mission [facility]   Potential Assistance Needed N/A   DME Ordered? No   Potential Assistance Purchasing Medications No   Type of Home Care Services None   Patient expects to be discharged to: [address] Discharge   Services At/After Discharge None     RN KANG met with the patient and his visitor at the bedside and discussed discharge planning. He was provided with a coupon for Pradaxa and given information on [email or web address] Avitia RN, CM delivered the [employer]: online [employer] form. He declined signing the letter and requested to take it with him. RN CM encouraged him to ask questions. He verbalized understanding and agreement with the discharge plan. No case management needs were identified.

## 2023-02-22 NOTE — CONSULTS
PULMONARY/CRITICAL CARE CONSULT NOTE           2/22/2023    Karel Ball                        Date of Admission:  2/19/2023    The patient's chart is reviewed and the patient is discussed with the staff. Patient is a 81 y.o.  male seen and evaluated at the request of Dr. Darline Olvera for the evaluation of pulmonary embolism. Karen Chavis is a 81 y.o. male with medical history of recent PE  in 10/2022 on Eliquis , FLETCHER neoplasm ,patient if Dr Pura King and Dr. 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 He just had a PET/CT 2 weeks ago that showed resolution of the FDG uptake in the FLETCHER and evidence of partial FLETCHER obstruction, pulmonology at [facility] is following and was considering bronchoscopy but ultimately opted against this. He presented with tachycardia to the [facility] ED and was found to have a mural thrombus in the left upper lobe pulmonary artery on CT PE protocol. Today he reports he is doing well. He is on room air. he is considering switching his care to [facility]  Subjective:   No distress on RA complains of epigastric discomfort and has known reflux.       Review of Systems: Comprehensive ROS negative except in HPI    Current Outpatient Medications   Medication Instructions    allopurinol (ZYLOPRIM) 100 mg, Oral, DAILY    apixaban (ELIQUIS) 5 mg, Oral, 2 TIMES DAILY    aspirin 81 mg, Oral, DAILY    atorvastatin (LIPITOR) 10 mg, Oral, DAILY    Cetirizine HCl (ZYRTEC ALLERGY) 10 MG CAPS Oral    losartan (COZAAR) 50 MG tablet Oral, DAILY    meclizine (ANTIVERT) 25 mg, Oral, DAILY PRN    METHOCARBAMOL PO Oral      Past Medical History:   Diagnosis Date    Hypercholesterolemia     Hypertension     Vertigo     Vertigo     Past Surgical History:   Procedure Laterality Date    APPENDECTOMY      GI  1964    Colon resection    NEUROLOGICAL SURGERY  1988    neck     Social History     Socioeconomic History Marital status:      Spouse name: Not on file    Number of children: Not on file    Years of education: Not on file    Highest education level: Not on file   Occupational History    Not on file   Tobacco Use    Smoking status: Never    Smokeless tobacco: Never   Substance and Sexual Activity    Alcohol use: No    Drug use: No    Sexual activity: Not on file   Other Topics Concern    Not on file   Social History Narrative    Not on file     Social Determinants of Health     Financial Resource Strain: Unknown    Difficulty of Paying Living Expenses: Patient refused   Food Insecurity: Unknown    Worried About [address] in the Last Year: Patient refused    [address] in the Last Year: Patient refused   Transportation Needs: Unknown    Lack of Transportation (Medical): Not on file    Lack of Transportation (Non-Medical): Patient refused   Physical Activity: Not on file   Stress: Not on file   Social Connections: Not on file   Intimate Partner Violence: Not on file   Housing Stability: Unknown    Unable to Pay for Housing in the Last Year: Not on file    Number of [place] in the Last Year: Not on file    Unstable Housing in the Last Year: Patient refused     Family History   Problem Relation Age of Onset    Hypertension Father     Hypertension Mother     Osteoarthritis Mother     Stroke Father     Lupus Mother      Allergies   Allergen Reactions    Amlodipine Other (See Comments)     Other reaction(s): Other- (not listed) - Allergy  Shot bp up      Atorvastatin Other (See Comments)    Bupropion Other (See Comments)    Clonidine Other (See Comments)    Doxazosin Other (See Comments)    Hydrochlorothiazide Other (See Comments)    Lisinopril Other (See Comments)    Metoprolol Other (See Comments)    Simvastatin Other (See Comments)     Objective:   Blood pressure 137/84, pulse 88, temperature 97.5 °F (36.4 °C), temperature source Oral, resp.  rate 16, height 5' 6\" (1.676 m), weight 186 lb (84.4 kg), SpO2 90 %. Intake/Output Summary (Last 24 hours) at 2/22/2023 0908  Last data filed at 2/21/2023 1834  Gross per 24 hour   Intake 540 ml   Output --   Net 540 ml       PHYSICAL EXAM   Constitutional:  the patient is well developed and in no acute distress  EENMT:  Sclera clear, pupils equal, oral mucosa moist  Respiratory: symmetric chest rise. clear  Cardiovascular:  RRR without M,G,R. There is no lower extremity edema. Gastrointestinal: soft and non-tender; with positive bowel sounds. Musculoskeletal: warm without cyanosis. Normal muscle tone. Skin:  no jaundice or rashes, no wounds   Neurologic: symmetric strength, fluent speech  Psychiatric:  calm, appropriate, oriented x 4    Imaging: I performed an independent interpretation of the patient's images. CT Chest:                 Recent Labs     02/19/23  0936 02/19/23  1118 02/20/23  0328 02/21/23  0539 02/22/23  0539   WBC  --    < > 7.3 6.5 6.3   HGB  --    < > 11.7* 11.2* 11.0*   HCT  --    < > 36.4* 35.2* 34.3*   PLT  --    < > 216 214 204   INR 1.3  --   --   --   --    NA  --   --  140  --  140   K  --   --  4.1  --  3.7   CL  --   --  107  --  107   CO2  --   --  27  --  27   BUN  --   --  22  --  17   CREATININE  --   --  0.95  --  0.93   BILITOT  --   --   --   --  0.4   AST  --   --   --   --  24   ALT  --   --   --   --  25   ALKPHOS  --   --   --   --  80   TROPHS 9.9  --   --   --   --     < > = values in this interval not displayed. ECHO: No results found for this or any previous visit. MICRO: No results for input(s): CULTURE in the last 72 hours.   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 He also has pulmonary embolism in October 2022 and has been on Eliquis since then. He presented with new thrombus in left upper lobe pulmonary artery. He also has been followed by pulmonary over [facility] and there is a concern for endobronchial lesion and bronchoscopy was considered by [facility] however patient said he would not want to have bronchoscopy unless really needed.  -He has new PE and anticoagulation therapy was already addressed by Dr. Marcio Le and the plan are currently to change his Eliquis to Pradaxa  He will need [facility] for the rest of his life , he is on RA. 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 This is not affecting him physically and therefore bronchoscopy is of limited value. Will see as needed and sign off for now. Full Code    More than 50% of the time documented was spent in face-to-face contact with the patient and in the care of the patient on the floor/unit where the patient is located.     Deana Bush MD

## 2023-02-23 ENCOUNTER — APPOINTMENT (OUTPATIENT)
Dept: GENERAL RADIOLOGY | Age: 88
DRG: 179 | End: 2023-02-23
Payer: MEDICARE

## 2023-02-23 ENCOUNTER — APPOINTMENT (OUTPATIENT)
Dept: CT IMAGING | Age: 88
DRG: 179 | End: 2023-02-23
Payer: MEDICARE

## 2023-02-23 ENCOUNTER — HOSPITAL ENCOUNTER (EMERGENCY)
Age: 88
Discharge: ANOTHER HOSPITAL | DRG: 179 | End: 2023-02-24
Attending: EMERGENCY MEDICINE
Payer: MEDICARE

## 2023-02-23 DIAGNOSIS — J18.9 PNEUMONIA OF LEFT LUNG DUE TO INFECTIOUS ORGANISM, UNSPECIFIED PART OF LUNG: Primary | ICD-10-CM

## 2023-02-23 DIAGNOSIS — R26.2 INABILITY TO WALK: ICD-10-CM

## 2023-02-23 DIAGNOSIS — R09.02 HYPOXIA: ICD-10-CM

## 2023-02-23 LAB
ALBUMIN SERPL-MCNC: 4 G/DL (ref 3.2–4.6)
ALBUMIN/GLOB SERPL: 1.2 (ref 0.4–1.6)
ALP SERPL-CCNC: 85 U/L (ref 45–117)
ALT SERPL-CCNC: 43 U/L (ref 13–61)
ANION GAP SERPL CALC-SCNC: 13 MMOL/L (ref 2–11)
AST SERPL-CCNC: 53 U/L (ref 15–37)
BASOPHILS # BLD: 0.1 K/UL (ref 0–0.2)
BASOPHILS NFR BLD: 1 % (ref 0–2)
BILIRUB SERPL-MCNC: 0.5 MG/DL (ref 0.2–1.1)
BUN SERPL-MCNC: 22 MG/DL (ref 7–18)
CALCIUM SERPL-MCNC: 9.5 MG/DL (ref 8.3–10.4)
CHLORIDE SERPL-SCNC: 101 MMOL/L (ref 98–107)
CO2 SERPL-SCNC: 24 MMOL/L (ref 21–32)
CREAT SERPL-MCNC: 1.02 MG/DL (ref 0.8–1.5)
DIFFERENTIAL METHOD BLD: ABNORMAL
EOSINOPHIL # BLD: 0 K/UL (ref 0–0.8)
EOSINOPHIL NFR BLD: 0 % (ref 0.5–7.8)
ERYTHROCYTE [DISTWIDTH] IN BLOOD BY AUTOMATED COUNT: 14 % (ref 11.9–14.6)
GLOBULIN SER CALC-MCNC: 3.4 G/DL (ref 2.8–4.5)
GLUCOSE SERPL-MCNC: 109 MG/DL (ref 65–100)
HCT VFR BLD AUTO: 35.4 % (ref 41.1–50.3)
HGB BLD-MCNC: 11.6 G/DL (ref 13.6–17.2)
IMM GRANULOCYTES # BLD AUTO: 0.1 K/UL (ref 0–0.5)
IMM GRANULOCYTES NFR BLD AUTO: 1 % (ref 0–5)
LACTATE SERPL-SCNC: 1.2 MMOL/L (ref 0.4–2)
LIPASE SERPL-CCNC: 8 U/L (ref 13–60)
LYMPHOCYTES # BLD: 0.7 K/UL (ref 0.5–4.6)
LYMPHOCYTES NFR BLD: 8 % (ref 13–44)
MAGNESIUM SERPL-MCNC: 2.1 MG/DL (ref 1.2–2.6)
MCH RBC QN AUTO: 32.8 PG (ref 26.1–32.9)
MCHC RBC AUTO-ENTMCNC: 32.8 G/DL (ref 31.4–35)
MCV RBC AUTO: 100 FL (ref 82–102)
MONOCYTES # BLD: 1 K/UL (ref 0.1–1.3)
MONOCYTES NFR BLD: 12 % (ref 4–12)
NEUTS SEG # BLD: 6.9 K/UL (ref 1.7–8.2)
NEUTS SEG NFR BLD: 79 % (ref 43–78)
NRBC # BLD: 0 K/UL (ref 0–0.2)
PLATELET # BLD AUTO: 169 K/UL (ref 150–450)
PMV BLD AUTO: 9 FL (ref 9.4–12.3)
POTASSIUM SERPL-SCNC: 4.2 MMOL/L (ref 3.5–5.1)
PROCALCITONIN SERPL-MCNC: 0.1 NG/ML (ref 0–0.49)
PROT SERPL-MCNC: 7.4 G/DL (ref 6.4–8.2)
RBC # BLD AUTO: 3.54 M/UL (ref 4.23–5.6)
SODIUM SERPL-SCNC: 138 MMOL/L (ref 133–143)
TROPONIN T SERPL HS-MCNC: 21.3 NG/L (ref 0–22)
WBC # BLD AUTO: 8.7 K/UL (ref 4.3–11.1)

## 2023-02-23 PROCEDURE — 71045 X-RAY EXAM CHEST 1 VIEW: CPT

## 2023-02-23 PROCEDURE — 6360000002 HC RX W HCPCS: Performed by: EMERGENCY MEDICINE

## 2023-02-23 PROCEDURE — 2580000003 HC RX 258: Performed by: EMERGENCY MEDICINE

## 2023-02-23 PROCEDURE — 84484 ASSAY OF TROPONIN QUANT: CPT

## 2023-02-23 PROCEDURE — 83605 ASSAY OF LACTIC ACID: CPT

## 2023-02-23 PROCEDURE — 80053 COMPREHEN METABOLIC PANEL: CPT

## 2023-02-23 PROCEDURE — 70450 CT HEAD/BRAIN W/O DYE: CPT

## 2023-02-23 PROCEDURE — 83690 ASSAY OF LIPASE: CPT

## 2023-02-23 PROCEDURE — 87502 INFLUENZA DNA AMP PROBE: CPT

## 2023-02-23 PROCEDURE — 87040 BLOOD CULTURE FOR BACTERIA: CPT

## 2023-02-23 PROCEDURE — 84145 PROCALCITONIN (PCT): CPT

## 2023-02-23 PROCEDURE — 87635 SARS-COV-2 COVID-19 AMP PRB: CPT

## 2023-02-23 PROCEDURE — 6370000000 HC RX 637 (ALT 250 FOR IP): Performed by: EMERGENCY MEDICINE

## 2023-02-23 PROCEDURE — 85025 COMPLETE CBC W/AUTO DIFF WBC: CPT

## 2023-02-23 PROCEDURE — 83735 ASSAY OF MAGNESIUM: CPT

## 2023-02-23 PROCEDURE — 72125 CT NECK SPINE W/O DYE: CPT

## 2023-02-23 RX ORDER — ACETAMINOPHEN 500 MG
1000 TABLET ORAL
Status: COMPLETED | OUTPATIENT
Start: 2023-02-23 | End: 2023-02-23

## 2023-02-23 RX ORDER — SODIUM CHLORIDE, SODIUM LACTATE, POTASSIUM CHLORIDE, CALCIUM CHLORIDE 600; 310; 30; 20 MG/100ML; MG/100ML; MG/100ML; MG/100ML
INJECTION, SOLUTION INTRAVENOUS CONTINUOUS
Status: DISCONTINUED | OUTPATIENT
Start: 2023-02-23 | End: 2023-02-24 | Stop reason: HOSPADM

## 2023-02-23 RX ADMIN — PIPERACILLIN AND TAZOBACTAM 4500 MG: 4; .5 INJECTION, POWDER, FOR SOLUTION INTRAVENOUS at 23:42

## 2023-02-23 RX ADMIN — ACETAMINOPHEN 1000 MG: 500 TABLET, FILM COATED ORAL at 23:38

## 2023-02-23 ASSESSMENT — PAIN SCALES - GENERAL: PAINLEVEL_OUTOF10: 6

## 2023-02-23 ASSESSMENT — PAIN - FUNCTIONAL ASSESSMENT: PAIN_FUNCTIONAL_ASSESSMENT: 0-10

## 2023-02-23 ASSESSMENT — PAIN DESCRIPTION - LOCATION: LOCATION: CHEST

## 2023-02-23 ASSESSMENT — PAIN DESCRIPTION - FREQUENCY: FREQUENCY: INTERMITTENT

## 2023-02-23 ASSESSMENT — PAIN DESCRIPTION - ORIENTATION: ORIENTATION: RIGHT;LEFT

## 2023-02-24 ENCOUNTER — HOSPITAL ENCOUNTER (INPATIENT)
Age: 88
LOS: 1 days | Discharge: HOME | DRG: 179 | End: 2023-02-25
Attending: FAMILY MEDICINE | Admitting: INTERNAL MEDICINE
Payer: MEDICARE

## 2023-02-24 VITALS
TEMPERATURE: 100.4 F | RESPIRATION RATE: 20 BRPM | SYSTOLIC BLOOD PRESSURE: 137 MMHG | OXYGEN SATURATION: 93 % | HEIGHT: 66 IN | HEART RATE: 74 BPM | DIASTOLIC BLOOD PRESSURE: 92 MMHG | BODY MASS INDEX: 28.93 KG/M2 | WEIGHT: 180 LBS

## 2023-02-24 PROBLEM — R09.02 HYPOXIA: Status: ACTIVE | Noted: 2023-02-24

## 2023-02-24 LAB
APPEARANCE UR: CLEAR
BACTERIA SPEC CULT: NORMAL
BACTERIA URNS QL MICRO: NORMAL /HPF
BILIRUB UR QL: NEGATIVE
CASTS URNS QL MICRO: 0 /LPF
COLOR UR: YELLOW
CRYSTALS URNS QL MICRO: 0 /LPF
EKG ATRIAL RATE: 91 BPM
EKG DIAGNOSIS: NORMAL
EKG P AXIS: 40 DEGREES
EKG P-R INTERVAL: 157 MS
EKG Q-T INTERVAL: 343 MS
EKG QRS DURATION: 97 MS
EKG QTC CALCULATION (BAZETT): 425 MS
EKG R AXIS: 51 DEGREES
EKG T AXIS: 5 DEGREES
EKG VENTRICULAR RATE: 92 BPM
EPI CELLS #/AREA URNS HPF: NORMAL /HPF
FLUAV RNA SPEC QL NAA+PROBE: NOT DETECTED
FLUBV RNA SPEC QL NAA+PROBE: NOT DETECTED
GLUCOSE UR STRIP.AUTO-MCNC: NEGATIVE MG/DL
HGB UR QL STRIP: ABNORMAL
KETONES UR QL STRIP.AUTO: NEGATIVE MG/DL
LACTATE BLD-SCNC: 1.01 MMOL/L (ref 0.5–1.9)
LACTATE SERPL-SCNC: 1.01 MMOL/L (ref 0.4–2)
LEUKOCYTE ESTERASE UR QL STRIP.AUTO: NEGATIVE
MUCOUS THREADS URNS QL MICRO: NORMAL /LPF
NITRITE UR QL STRIP.AUTO: NEGATIVE
OTHER OBSERVATIONS: NORMAL
PH UR STRIP: 5.5 (ref 5–9)
PROT UR STRIP-MCNC: 30 MG/DL
RBC #/AREA URNS HPF: NORMAL /HPF
SARS-COV-2 RDRP RESP QL NAA+PROBE: DETECTED
SERVICE CMNT-IMP: NORMAL
SOURCE: ABNORMAL
SP GR UR REFRACTOMETRY: 1.02 (ref 1–1.02)
UROBILINOGEN UR QL STRIP.AUTO: 0.2 EU/DL (ref 0.2–1)
WBC URNS QL MICRO: 0 /HPF

## 2023-02-24 PROCEDURE — 6360000002 HC RX W HCPCS: Performed by: EMERGENCY MEDICINE

## 2023-02-24 PROCEDURE — 6370000000 HC RX 637 (ALT 250 FOR IP): Performed by: INTERNAL MEDICINE

## 2023-02-24 PROCEDURE — 2580000003 HC RX 258: Performed by: EMERGENCY MEDICINE

## 2023-02-24 PROCEDURE — 1100000000 HC RM PRIVATE

## 2023-02-24 PROCEDURE — 83605 ASSAY OF LACTIC ACID: CPT

## 2023-02-24 PROCEDURE — 87641 MR-STAPH DNA AMP PROBE: CPT

## 2023-02-24 PROCEDURE — 6360000002 HC RX W HCPCS: Performed by: INTERNAL MEDICINE

## 2023-02-24 PROCEDURE — 81001 URINALYSIS AUTO W/SCOPE: CPT

## 2023-02-24 PROCEDURE — 2580000003 HC RX 258: Performed by: INTERNAL MEDICINE

## 2023-02-24 RX ORDER — DEXAMETHASONE SODIUM PHOSPHATE 10 MG/ML
6 INJECTION, SOLUTION INTRAMUSCULAR; INTRAVENOUS
Status: COMPLETED | OUTPATIENT
Start: 2023-02-24 | End: 2023-02-24

## 2023-02-24 RX ORDER — DABIGATRAN ETEXILATE 150 MG/1
150 CAPSULE ORAL 2 TIMES DAILY
Status: DISCONTINUED | OUTPATIENT
Start: 2023-02-24 | End: 2023-02-25 | Stop reason: HOSPADM

## 2023-02-24 RX ORDER — ALLOPURINOL 100 MG/1
100 TABLET ORAL DAILY
Status: DISCONTINUED | OUTPATIENT
Start: 2023-02-24 | End: 2023-02-25 | Stop reason: HOSPADM

## 2023-02-24 RX ORDER — FAMOTIDINE 20 MG/1
20 TABLET, FILM COATED ORAL 2 TIMES DAILY
Status: DISCONTINUED | OUTPATIENT
Start: 2023-02-24 | End: 2023-02-25 | Stop reason: HOSPADM

## 2023-02-24 RX ORDER — SODIUM CHLORIDE 0.9 % (FLUSH) 0.9 %
5-40 SYRINGE (ML) INJECTION PRN
Status: DISCONTINUED | OUTPATIENT
Start: 2023-02-24 | End: 2023-02-25 | Stop reason: HOSPADM

## 2023-02-24 RX ORDER — ACETAMINOPHEN 325 MG/1
650 TABLET ORAL EVERY 6 HOURS PRN
Status: DISCONTINUED | OUTPATIENT
Start: 2023-02-24 | End: 2023-02-25 | Stop reason: HOSPADM

## 2023-02-24 RX ORDER — ACETAMINOPHEN 650 MG/1
650 SUPPOSITORY RECTAL EVERY 6 HOURS PRN
Status: DISCONTINUED | OUTPATIENT
Start: 2023-02-24 | End: 2023-02-25 | Stop reason: HOSPADM

## 2023-02-24 RX ORDER — SODIUM CHLORIDE, SODIUM LACTATE, POTASSIUM CHLORIDE, AND CALCIUM CHLORIDE .6; .31; .03; .02 G/100ML; G/100ML; G/100ML; G/100ML
500 INJECTION, SOLUTION INTRAVENOUS
Status: COMPLETED | OUTPATIENT
Start: 2023-02-24 | End: 2023-02-24

## 2023-02-24 RX ORDER — ONDANSETRON 2 MG/ML
4 INJECTION INTRAMUSCULAR; INTRAVENOUS EVERY 6 HOURS PRN
Status: DISCONTINUED | OUTPATIENT
Start: 2023-02-24 | End: 2023-02-25 | Stop reason: HOSPADM

## 2023-02-24 RX ORDER — LEVOFLOXACIN 500 MG/1
500 TABLET, FILM COATED ORAL DAILY
Status: DISCONTINUED | OUTPATIENT
Start: 2023-02-24 | End: 2023-02-25 | Stop reason: HOSPADM

## 2023-02-24 RX ORDER — SODIUM CHLORIDE 9 MG/ML
INJECTION, SOLUTION INTRAVENOUS PRN
Status: DISCONTINUED | OUTPATIENT
Start: 2023-02-24 | End: 2023-02-25 | Stop reason: HOSPADM

## 2023-02-24 RX ORDER — ASPIRIN 81 MG/1
81 TABLET, CHEWABLE ORAL DAILY
Status: DISCONTINUED | OUTPATIENT
Start: 2023-02-24 | End: 2023-02-25 | Stop reason: HOSPADM

## 2023-02-24 RX ORDER — ATORVASTATIN CALCIUM 10 MG/1
10 TABLET, FILM COATED ORAL DAILY
Status: DISCONTINUED | OUTPATIENT
Start: 2023-02-24 | End: 2023-02-25 | Stop reason: HOSPADM

## 2023-02-24 RX ORDER — ATORVASTATIN CALCIUM 10 MG/1
10 TABLET, FILM COATED ORAL DAILY
Status: DISCONTINUED | OUTPATIENT
Start: 2023-02-24 | End: 2023-02-24

## 2023-02-24 RX ORDER — ALBUTEROL SULFATE 90 UG/1
2 AEROSOL, METERED RESPIRATORY (INHALATION) EVERY 6 HOURS PRN
Status: DISCONTINUED | OUTPATIENT
Start: 2023-02-24 | End: 2023-02-25 | Stop reason: HOSPADM

## 2023-02-24 RX ORDER — ENOXAPARIN SODIUM 100 MG/ML
40 INJECTION SUBCUTANEOUS DAILY
Status: DISCONTINUED | OUTPATIENT
Start: 2023-02-24 | End: 2023-02-24

## 2023-02-24 RX ORDER — ONDANSETRON 4 MG/1
4 TABLET, ORALLY DISINTEGRATING ORAL EVERY 8 HOURS PRN
Status: DISCONTINUED | OUTPATIENT
Start: 2023-02-24 | End: 2023-02-25 | Stop reason: HOSPADM

## 2023-02-24 RX ORDER — SODIUM CHLORIDE 0.9 % (FLUSH) 0.9 %
5-40 SYRINGE (ML) INJECTION EVERY 12 HOURS SCHEDULED
Status: DISCONTINUED | OUTPATIENT
Start: 2023-02-24 | End: 2023-02-25 | Stop reason: HOSPADM

## 2023-02-24 RX ORDER — POLYETHYLENE GLYCOL 3350 17 G/17G
17 POWDER, FOR SOLUTION ORAL DAILY PRN
Status: DISCONTINUED | OUTPATIENT
Start: 2023-02-24 | End: 2023-02-25 | Stop reason: HOSPADM

## 2023-02-24 RX ORDER — GUAIFENESIN/DEXTROMETHORPHAN 100-10MG/5
10 SYRUP ORAL EVERY 4 HOURS PRN
Status: DISCONTINUED | OUTPATIENT
Start: 2023-02-24 | End: 2023-02-25 | Stop reason: HOSPADM

## 2023-02-24 RX ORDER — LOSARTAN POTASSIUM 50 MG/1
50 TABLET ORAL DAILY
Status: DISCONTINUED | OUTPATIENT
Start: 2023-02-24 | End: 2023-02-24

## 2023-02-24 RX ADMIN — ASPIRIN 81 MG 81 MG: 81 TABLET ORAL at 09:21

## 2023-02-24 RX ADMIN — FAMOTIDINE 20 MG: 20 TABLET ORAL at 21:10

## 2023-02-24 RX ADMIN — LEVOFLOXACIN 500 MG: 500 TABLET, FILM COATED ORAL at 16:22

## 2023-02-24 RX ADMIN — FAMOTIDINE 20 MG: 20 TABLET ORAL at 09:21

## 2023-02-24 RX ADMIN — SODIUM CHLORIDE 2000 MG: 9 INJECTION, SOLUTION INTRAVENOUS at 09:39

## 2023-02-24 RX ADMIN — SODIUM CHLORIDE, POTASSIUM CHLORIDE, SODIUM LACTATE AND CALCIUM CHLORIDE 500 ML: 600; 310; 30; 20 INJECTION, SOLUTION INTRAVENOUS at 02:00

## 2023-02-24 RX ADMIN — SODIUM CHLORIDE, PRESERVATIVE FREE 5 ML: 5 INJECTION INTRAVENOUS at 09:26

## 2023-02-24 RX ADMIN — ALLOPURINOL 100 MG: 100 TABLET ORAL at 09:24

## 2023-02-24 RX ADMIN — SODIUM CHLORIDE, POTASSIUM CHLORIDE, SODIUM LACTATE AND CALCIUM CHLORIDE 500 ML: 600; 310; 30; 20 INJECTION, SOLUTION INTRAVENOUS at 00:36

## 2023-02-24 RX ADMIN — ATORVASTATIN CALCIUM 10 MG: 10 TABLET, FILM COATED ORAL at 16:23

## 2023-02-24 RX ADMIN — DABIGATRAN ETEXILATE MESYLATE 150 MG: 150 CAPSULE ORAL at 21:10

## 2023-02-24 RX ADMIN — SODIUM CHLORIDE, POTASSIUM CHLORIDE, SODIUM LACTATE AND CALCIUM CHLORIDE: 600; 310; 30; 20 INJECTION, SOLUTION INTRAVENOUS at 01:50

## 2023-02-24 RX ADMIN — SODIUM CHLORIDE, PRESERVATIVE FREE 10 ML: 5 INJECTION INTRAVENOUS at 21:10

## 2023-02-24 RX ADMIN — CEFEPIME 2000 MG: 2 INJECTION, POWDER, FOR SOLUTION INTRAVENOUS at 09:18

## 2023-02-24 RX ADMIN — DEXAMETHASONE SODIUM PHOSPHATE 6 MG: 10 INJECTION, SOLUTION INTRAMUSCULAR; INTRAVENOUS at 03:32

## 2023-02-24 ASSESSMENT — ENCOUNTER SYMPTOMS
ABDOMINAL PAIN: 0
FACIAL SWELLING: 0
SHORTNESS OF BREATH: 1
COUGH: 1
VOMITING: 0
DIARRHEA: 0

## 2023-02-24 ASSESSMENT — PAIN - FUNCTIONAL ASSESSMENT: PAIN_FUNCTIONAL_ASSESSMENT: NONE - DENIES PAIN

## 2023-02-24 NOTE — CARE COORDINATION
02/24/23 0935   Service Assessment   Patient Orientation Alert and Oriented   Cognition Alert   History Provided By Patient   Primary Caregiver Self   Support Systems Spouse/Significant Other   PCP Verified by CM Yes   Prior Functional Level Assistance with the following:;Housework   Current Functional Level Assistance with the following:   Can patient return to prior living arrangement Unknown at present   Ability to make needs known: Good   Family able to assist with home care needs: No   Would you like for me to discuss the discharge plan with any other family members/significant others, and if so, who? Yes   Financial Resources Medicare   Social/Functional History   Lives With Spouse   Type of [address] One level   ADL Assistance Independent   Homemaking Assistance Needs assistance   Homemaking Responsibilities Yes   Ambulation Assistance Needs assistance   Transfer Assistance Independent   Active  Yes   Mode of Transportation Car   Patient uses a rollator to ambulate. No home 02. Drives. History of HH and rehab. Drives. Confirmed patient has a pcp. Patient would like [facility] at discharge because his wife recently had surgery and he needs to recover quickly to assist her. CM following.

## 2023-02-24 NOTE — ED TRIAGE NOTES
Pt presents for SOB, weakness, multiple falls today. Pt was recently in the hospital with a PE and [address] home. Aox4, color appropriate, dyspnea on exertion. Per ems was able to stand with assist x2 at home.

## 2023-02-24 NOTE — PROGRESS NOTES
TRANSFER - IN REPORT:    Verbal report received from Sapna Schulte RN on Vincent Gain  being received from [facility] ED or routine progression of patient care      Report consisted of patient's Situation, Background, Assessment and   Recommendations(SBAR). Information from the following report(s) ED Encounter Summary was reviewed with the receiving nurse. Opportunity for questions and clarification was provided. Assessment completed upon patient's arrival to unit and care assumed. Pt transferred on room air.

## 2023-02-24 NOTE — ED PROVIDER NOTES
Emergency Department Provider Note                   PCP:                Sp Rainey MD               Age: 81 y.o. Sex: male       ICD-10-CM    1. Pneumonia of left lung due to infectious organism, unspecified part of lung  J18.9       2. Hypoxia  R09.02       3. Inability to walk  R26.2           DISPOSITION Decision To Transfer 02/24/2023 12:04:43 AM       Medical Decision Making  No focal deficits. Worsening infiltrate left lung with fever 100.4 and hypoxia. Sats 90% upon arrival.  Placed on 2 L. Discussed with hospitalist for admission. Given antibiotics. Cultures sent. COVID positive. Given decadron. Amount and/or Complexity of Data Reviewed  External Data Reviewed: labs, radiology, ECG and notes. Labs: ordered. Radiology: ordered and independent interpretation performed. ECG/medicine tests: ordered and independent interpretation performed. Risk  OTC drugs. Prescription drug management. Patient was admitted I have communication with admitting physician.          Orders Placed This Encounter   Procedures    Blood Culture 1    Blood Culture 2    Influenza A/B, Molecular    COVID-19, Rapid    XR CHEST PORTABLE    CT Head W/O Contrast    CT CSpine W/O Contrast    CBC with Auto Differential    Comprehensive Metabolic Panel    Magnesium    Troponin T    Lactate, Sepsis    Urinalysis w rflx microscopic    Procalcitonin    Lactic Acid    Lipase    Cardiac Monitor - ED Only    Continuous Pulse Oximetry    EKG 12 Lead    Saline lock IV        Medications   lactated ringers IV soln infusion (has no administration in time range)   dexamethasone (PF) (DECADRON) injection 6 mg (has no administration in time range)   piperacillin-tazobactam (ZOSYN) 4,500 mg in sodium chloride 0.9 % 100 mL IVPB (mini-bag) (4,500 mg IntraVENous New Bag 2/23/23 2342)   acetaminophen (TYLENOL) tablet 1,000 mg (1,000 mg Oral Given 2/23/23 2338)       New Prescriptions    No medications on file Lana Jeong is a 81 y.o. male who presents to the Emergency Department with chief complaint of    Chief Complaint   Patient presents with    Extremity Weakness      81year-old male presents with generalized weakness. He was discharged from the hospital on  after a 4-day admission for pulmonary embolism and possible pneumonia. He was not discharged on antibiotics because it was thought to possibly be tumor invasion. He was changed from Eliquis to Pradaxa. He states that since arriving home, he has been unable to walk. He has fallen 3 times. He has not struck his head. No loss of consciousness. No injuries from the fall. Reports worsening shortness of breath and arrives with a fever of 100.4. Denies having a known fever at home. No chills or sweats. He reports a cough productive of clear sputum. He is not on home oxygen. Hospitalist Discharge Summary  Admit Date:     2023  6:57 AM   DC Note date: 2023  Name:              Lana Jeong   Age:                 81 y.o.   Sex:                 male  :               1935   MRN:               [id number]   Room:              [facility]  PCP:                Victoria Murphy MD     Presenting Complaint: Tachycardia     Initial Admission Diagnosis: Pneumonia of left upper lobe due to infectious organism [J18.9]  Pulmonary embolism without acute cor pulmonale, unspecified chronicity, unspecified pulmonary embolism type ([facility]) [I26.99]  Acute pulmonary embolism, unspecified pulmonary embolism type, unspecified whether acute cor pulmonale present ([address] [I26.99]      Problem List for this Hospitalization (present on admission):     Principal Problem:    Acute pulmonary embolism, unspecified pulmonary embolism type, unspecified whether acute cor pulmonale present ([address]  Active Problems:    Pulmonary embolism without acute cor pulmonale ([address]    Essential hypertension    GERD (gastroesophageal reflux disease)    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 *        Hospital Course:  Kiko Nixon is a 81 y.o. male with medical history of PE on Eliquis, FLETCHER neoplasm currently in remission, HLD, HTN who presented with elevated heart rate noted on home monitor. CT chested found left upper lobe pneumonia and mural thrombus within the left upper lobe pulmonary artery. He was started on heparin gtt. On RA. Received Rocephin/azithromycin in ED. Is followed at [facility].  2/14 had CT chest with Pulmonary at [facility] Dr. Kellie Aguilar, findings of left upper lobe inferior consolidation vs pneumonia vs tumor invasion. Plans to repeat CT chest in 4 weeks. Hematology and Pulmonary consulted. 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 Hematology recommended pradaxa for life. Denies CP, SOB, n/v/d, abd pain. Disposition: home  Diet: ADULT DIET; Regular  Code Status: Full Code     Follow Ups:        Time spent in patient discharge and coordination 45 minutes. Follow up labs/diagnostics (ultimately defer to outpatient provider):        Plan was discussed with pt. All questions answered. Patient was stable at time of discharge. Instructions given to call a physician or return if any concerns. Review of Systems   Constitutional:  Positive for fatigue and fever. HENT:  Negative for facial swelling. Eyes:  Negative for visual disturbance. Respiratory:  Positive for cough and shortness of breath. Cardiovascular:  Negative for chest pain. Gastrointestinal:  Negative for abdominal pain, diarrhea and vomiting. Musculoskeletal:  Negative for joint swelling. Skin:  Negative for rash. Neurological:  Negative for speech difficulty. Psychiatric/Behavioral:  Negative for confusion. All other systems reviewed and are negative.     Past Medical History:   Diagnosis Date    Hypercholesterolemia     Hypertension Vertigo     Vertigo        Past Surgical History:   Procedure Laterality Date    APPENDECTOMY      GI  1964    Colon resection    NEUROLOGICAL SURGERY  1988    neck        Family History   Problem Relation Age of Onset    Hypertension Father     Hypertension Mother     Osteoarthritis Mother     Stroke Father     Lupus Mother         Social History     Socioeconomic History    Marital status:    Tobacco Use    Smoking status: Never    Smokeless tobacco: Never   Substance and Sexual Activity    Alcohol use: No    Drug use: No     Social Determinants of Health     Financial Resource Strain: Unknown    Difficulty of Paying Living Expenses: Patient refused   Food Insecurity: Unknown    Worried About Running Out of Food in the Last Year: Patient refused    Ran Out of Food in the Last Year: Patient refused   Transportation Needs: Unknown    Lack of Transportation (Non-Medical): Patient refused   Housing Stability: Unknown    Unstable Housing in the Last Year: Patient refused        Allergies: Amlodipine, Atorvastatin, Bupropion, Clonidine, Doxazosin, Hydrochlorothiazide, Lisinopril, Metoprolol, and Simvastatin    Previous Medications    ALLOPURINOL (ZYLOPRIM) 100 MG TABLET    Take 1 tablet by mouth daily    ASPIRIN 81 MG CHEWABLE TABLET    Take 81 mg by mouth daily    ATORVASTATIN (LIPITOR) 20 MG TABLET    Take 10 mg by mouth daily    CETIRIZINE HCL (ZYRTEC ALLERGY) 10 MG CAPS    Take by mouth    DABIGATRAN (PRADAXA) 150 MG CAPSULE    Take 1 capsule by mouth 2 times daily    FAMOTIDINE (PEPCID) 20 MG TABLET    Take 1 tablet by mouth 2 times daily    LOSARTAN (COZAAR) 50 MG TABLET    Take by mouth daily    MECLIZINE (ANTIVERT) 25 MG TABLET    Take 1 tablet by mouth daily as needed for Dizziness        Vitals signs and nursing note reviewed.    Patient Vitals for the past 4 hrs:   Temp Pulse Resp BP SpO2   02/23/23 2342 -- 85 19 119/74 96 %   02/23/23 2312 -- 94 21 -- 96 %   02/23/23 2252 -- 87 26 124/73 95 % 02/23/23 2222 -- 96 22 132/80 --   02/23/23 2202 -- (!) 106 18 (!) 141/81 96 %   02/23/23 2144 100.4 °F (38 °C) (!) 101 20 (!) 156/92 90 %          Physical Exam  Vitals and nursing note reviewed. Constitutional:       Appearance: Normal appearance. HENT:      Head: Normocephalic and atraumatic. Nose: Nose normal.      Mouth/Throat:      Mouth: Mucous membranes are moist.   Eyes:      Extraocular Movements: Extraocular movements intact. Pupils: Pupils are equal, round, and reactive to light. Cardiovascular:      Rate and Rhythm: Normal rate and regular rhythm. Pulmonary:      Effort: Pulmonary effort is normal. No respiratory distress. Breath sounds: Normal breath sounds. Abdominal:      General: Abdomen is flat. There is no distension. Musculoskeletal:         General: No deformity. Normal range of motion. Cervical back: Normal range of motion and neck supple. Skin:     General: Skin is warm and dry. Neurological:      General: No focal deficit present. Mental Status: He is alert. Mental status is at baseline. Sensory: No sensory deficit. Motor: No weakness. Psychiatric:         Mood and Affect: Mood normal.        Procedures    ED EKG Interpretation  EKG was interpreted in the absence of a cardiologist.    Rate: 92  EKG Interpretation: EKG Interpretation: sinus rhythm, limited by artifact  ST Segments: Nonspecific ST segments - NO STEMI    Is this patient to be included in the SEP-1 core measure due to severe sepsis or septic shock?  No Exclusion criteria - the patient is NOT to be included for SEP-1 Core Measure due to: 2+ SIRS criteria are not met     Results for orders placed or performed during the hospital encounter of 02/23/23   COVID-19, Rapid    Specimen: Nasopharyngeal   Result Value Ref Range    Source Nasopharyngeal      SARS-CoV-2, Rapid Detected (A) NOTD     XR CHEST PORTABLE    Narrative    EXAM:  XR CHEST PORTABLE     DATE: 2/23/2023 10:02 PM    HISTORY:   Shortness of Breath weakness    COMPARISON:  2/19/2023. FINDINGS and     Impression    1. Mild progression of heterogeneous airspace opacity left mid to upper lung. Slightly more conspicuous opacity right midlung (infection, aspiration, edema,   atelectasis)  2. Heart size is normal.   3.  At least moderate narrowing of the right AC joint. Kaushik Saravia M.D.   2/23/2023 10:22:00 PM   CT Head W/O Contrast    Narrative    EXAMINATION: CT HEAD WO CONTRAST, CT CERVICAL SPINE WO CONTRAST    DATE: 2/23/2023 10:15 PM     INDICATION: Fall, head and neck pain, anticoagulated     COMPARISON: None available. TECHNIQUE: Noncontrast CT of the head with coronal reformats. Noncontrast   cervical spine CT with coronal and sagittal reformats. CT dose lowering   techniques were used, to include: automated exposure control, adjustment for   patient size, and or use of iterative reconstruction. Head CT findings:     Intracranial contents:    No acute intracranial hemorrhage, evidence of acute territorial infarct, mass,   mass effect or hydrocephalus. Cerebral volume is normal for age. Gray-white   differentiation is preserved. Bones and extracranial soft tissues:     No fracture or focal osseous lesion in the calvarium or skull base. Paranasal   sinuses are clear where visualized. Mastoid air cells and middle ear cavities   are clear bilaterally. No acute findings in the orbits. Cervical spine CT findings:    No cervical spine fracture. No focal osseous lesions. Moderate and large disc-osteophyte bulges throughout the cervical spine and   diffuse markedly severe bilateral facet arthrosis. Large osteophytes between the   lateral masses of C1 and C2 at the craniocervical junction. Diffuse high-grade   spinal canal and foraminal stenosis. Mild levoscoliosis. Bilateral carotid atherosclerosis. Masslike density in the left upper lobe partially visualized         Impression    1.  No acute intracranial abnormality. 2. No fracture of the calvarium, skull base or cervical spine. 3. Moderate chronic age-related intracranial findings as above. 4. Markedly extensive degenerative changes of the cervical spine. 5. Masslike density in the partially visualized left upper lobe. This examination was interpreted by Dariela Arango M.D. Dariela Arango M.D.   2/23/2023 11:04:00 PM   CT CSpine W/O Contrast    Narrative    EXAMINATION: CT HEAD WO CONTRAST, CT CERVICAL SPINE WO CONTRAST    DATE: 2/23/2023 10:15 PM     INDICATION: Fall, head and neck pain, anticoagulated     COMPARISON: None available. TECHNIQUE: Noncontrast CT of the head with coronal reformats. Noncontrast   cervical spine CT with coronal and sagittal reformats. CT dose lowering   techniques were used, to include: automated exposure control, adjustment for   patient size, and or use of iterative reconstruction. Head CT findings:     Intracranial contents:    No acute intracranial hemorrhage, evidence of acute territorial infarct, mass,   mass effect or hydrocephalus. Cerebral volume is normal for age. Gray-white   differentiation is preserved. Bones and extracranial soft tissues:     No fracture or focal osseous lesion in the calvarium or skull base. Paranasal   sinuses are clear where visualized. Mastoid air cells and middle ear cavities   are clear bilaterally. No acute findings in the orbits. Cervical spine CT findings:    No cervical spine fracture. No focal osseous lesions. Moderate and large disc-osteophyte bulges throughout the cervical spine and   diffuse markedly severe bilateral facet arthrosis. Large osteophytes between the   lateral masses of C1 and C2 at the craniocervical junction. Diffuse high-grade   spinal canal and foraminal stenosis. Mild levoscoliosis. Bilateral carotid atherosclerosis. Masslike density in the left upper lobe partially visualized         Impression    1.  No acute intracranial abnormality. 2. No fracture of the calvarium, skull base or cervical spine. 3. Moderate chronic age-related intracranial findings as above. 4. Markedly extensive degenerative changes of the cervical spine. 5. Masslike density in the partially visualized left upper lobe. This examination was interpreted by ROMARIO Harrell M.D.   2/23/2023 11:04:00 PM   CBC with Auto Differential   Result Value Ref Range    WBC 8.7 4.3 - 11.1 K/uL    RBC 3.54 (L) 4.23 - 5.60 M/uL    Hemoglobin 11.6 (L) 13.6 - 17.2 g/dL    Hematocrit 35.4 (L) 41.1 - 50.3 %    .0 82.0 - 102.0 FL    MCH 32.8 26.1 - 32.9 PG    MCHC 32.8 31.4 - 35.0 g/dL    RDW 14.0 11.9 - 14.6 %    Platelets 913 039 - 277 K/uL    MPV 9.0 (L) 9.4 - 12.3 FL    nRBC 0.00 0.0 - 0.2 K/uL    Differential Type AUTOMATED      Seg Neutrophils 79 (H) 43 - 78 %    Lymphocytes 8 (L) 13 - 44 %    Monocytes 12 4.0 - 12.0 %    Eosinophils % 0 (L) 0.5 - 7.8 %    Basophils 1 0.0 - 2.0 %    Immature Granulocytes 1 0.0 - 5.0 %    Segs Absolute 6.9 1.7 - 8.2 K/UL    Absolute Lymph # 0.7 0.5 - 4.6 K/UL    Absolute Mono # 1.0 0.1 - 1.3 K/UL    Absolute Eos # 0.0 0.0 - 0.8 K/UL    Basophils Absolute 0.1 0.0 - 0.2 K/UL    Absolute Immature Granulocyte 0.1 0.0 - 0.5 K/UL   Comprehensive Metabolic Panel   Result Value Ref Range    Sodium 138 133 - 143 mmol/L    Potassium 4.2 3.5 - 5.1 mmol/L    Chloride 101 98 - 107 mmol/L    CO2 24 21 - 32 mmol/L    Anion Gap 13 (H) 2 - 11 mmol/L    Glucose 109 (H) 65 - 100 mg/dL    BUN 22 (H) 7.0 - 18.0 MG/DL    Creatinine 1.02 0.8 - 1.5 MG/DL    Est, Glom Filt Rate >60 >60 ml/min/1.73m2    Calcium 9.5 8.3 - 10.4 MG/DL    Total Bilirubin 0.5 0.2 - 1.1 MG/DL    ALT 43 13.0 - 61.0 U/L    AST 53 (H) 15 - 37 U/L    Alk Phosphatase 85 45.0 - 117.0 U/L    Total Protein 7.4 6.4 - 8.2 g/dL    Albumin 4.0 3.2 - 4.6 g/dL    Globulin 3.4 2.8 - 4.5 g/dL    Albumin/Globulin Ratio 1.2 0.4 - 1.6     Magnesium Result Value Ref Range    Magnesium 2.1 1.2 - 2.6 mg/dL   Troponin T   Result Value Ref Range    Troponin T 21.3 0 - 22 ng/L   Procalcitonin   Result Value Ref Range    Procalcitonin 0.10 0.00 - 0.49 ng/mL   Lactic Acid   Result Value Ref Range    Lactic Acid 1.20 0.4 - 2.0 mmol/L   Lipase   Result Value Ref Range    Lipase 8 (L) 13 - 60 U/L        CT Head W/O Contrast   Final Result      1. No acute intracranial abnormality. 2. No fracture of the calvarium, skull base or cervical spine. 3. Moderate chronic age-related intracranial findings as above. 4. Markedly extensive degenerative changes of the cervical spine. 5. Masslike density in the partially visualized left upper lobe. This examination was interpreted by ROMARIO Agarwal M.D.    2/23/2023 11:04:00 PM      CT CSpine W/O Contrast   Final Result      1. No acute intracranial abnormality. 2. No fracture of the calvarium, skull base or cervical spine. 3. Moderate chronic age-related intracranial findings as above. 4. Markedly extensive degenerative changes of the cervical spine. 5. Masslike density in the partially visualized left upper lobe. This examination was interpreted by ROMARIO Agarwal M.D.    2/23/2023 11:04:00 PM      XR CHEST PORTABLE   Final Result   1. Mild progression of heterogeneous airspace opacity left mid to upper lung. Slightly more conspicuous opacity right midlung (infection, aspiration, edema,    atelectasis)   2. Heart size is normal.    3.  At least moderate narrowing of the right AC joint. Deisy Morales M.D.    2/23/2023 10:22:00 PM                            Voice dictation software was used during the making of this note. This software is not perfect and grammatical and other typographical errors may be present. This note has not been completely proofread for errors.      Robson Morse MD  02/24/23 2513

## 2023-02-24 NOTE — H&P
Hospitalist History and Physical   Admit Date:  2023  6:45 AM   Name:  Allegra Metz   Age:  81 y.o. Sex:  male  :  1935   MRN:  [id number]   Room:  [facility]/    Presenting Complaint: No chief complaint on file. Reason(s) for Admission: Hypoxia [R09.02]     History of Present Illness:   Allegra Metz is a 81 y.o. male with medical history of history of lung CVA, PE, HTN, GERD, dyslipidemia recently discharged on 2023 from [address] [place] [address] -- Hospitals in [place] seeking medical attention due to fever, shortness of breath and was diagnosed with COVID  --patient was seen by both pulmonary and oncology during last hospitalization    Discharge summary from 2022 reviewed -medical history of PE on Eliquis, FLETCHER neoplasm currently in remission, HLD, HTN who presented with elevated heart rate noted on home monitor. CT chested found left upper lobe pneumonia and mural thrombus within the left upper lobe pulmonary artery. He was started on heparin gtt. On RA. Received Rocephin/azithromycin in ED. Is followed at [facility].   had CT chest with Pulmonary at [facility] Dr. Danelle Sicard, findings of left upper lobe inferior consolidation vs pneumonia vs tumor invasion. Plans to repeat CT chest in 4 weeks. Hematology and Pulmonary consulted. 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 Hematology recommended pradaxa for life  -    Per ED documentation patient had a temp of 100.4, hypoxia and needed 2 L supplemental oxygen. On my eval this morning patient has no fever, normal WBC. On room oxygen ambulating in the room. Denies of any cough.   Symptomatically states that he is feeling a lot better and back to his baseline    Patient's primary oncologist is in  [place]    CT head no acute intracranial abnormality  CT C-spine-extensive degenerative changes of the C-spine      Assessment & Plan:       -COVID-19 infection  Clinically doing better no hypoxia not on any supplemental oxygen  No fever at this time, respirations 17, blood pressure 120s. Sats 95%  wBC 8.7      ? PNA /tumor     Does not appear to have a postobstructive pneumonia. Patient has no fever, normal WBC not having any productive cough  Given rehospitalization initially started on vancomycin and cefepime but clinically patient is looking a lot better. Will taper down antibiotic to levofloxacin. Can discharge with 5-day total course. Even during last hospitalization patient was initially started on Rocephin and azithromycin but pulmonary evaluated and did not recommend any antibiotics on discharge      -History of PE-  Recent admission for PE and diagnosed with Eliquis failure and now on Pradaxa    Note patient was seen by pulmonary, oncology during last hospitalization 2/21/23  --Chest x-ray mild progression of heterogeneous airspace opacity left mid to upper lung there is slightly more conspicuous opacity of the right midlung. 2/19 CT PE study -  1. Mural thrombus within the left upper lobe pulmonary artery. 2. Left upper lobe pneumonia. 3. Bibasilar scarring.       -History of lung cancer  Followed by oncologist and [place]. No acute issues      -Hypertension blood pressure in the normal range. Hold Norvasc    -GERD on dyslipidemia on statin Pepcid    -Disposition  Monitor overnight. If no acute distress patient can be discharged home          PT/OT evals and PPD needed/ordered? No    Diet: ADULT DIET; Regular  VTE prophylaxis:  Patient on Pradaxa from before for PE  Code status: Full Code    Hospital Problems:  Principal Problem:    Hypoxia  Resolved Problems:    * No resolved hospital problems.  *       Past History:     Past Medical History:   Diagnosis Date    Hypercholesterolemia     Hypertension     Vertigo     Vertigo       Past Surgical History:   Procedure Laterality Date    APPENDECTOMY      GI  1964    Colon resection    NEUROLOGICAL SURGERY  1988    neck        Social History     Tobacco Use    Smoking status: Never    Smokeless tobacco: Never   Substance Use Topics    Alcohol use: No      Social History     Substance and Sexual Activity   Drug Use No       Family History   Problem Relation Age of Onset    Hypertension Father     Hypertension Mother     Osteoarthritis Mother     Stroke Father     Lupus Mother         Immunization History   Administered Date(s) Administered    Influenza Virus Vaccine 10/05/2020    Influenza, Triv, inactivated, subunit, adjuvanted, IM (Fluad 65 yrs and older) 11/20/2017, 09/19/2018, 10/24/2019    PPD Test 05/11/2020     Allergies   Allergen Reactions    Amlodipine Other (See Comments)     Other reaction(s): Other- (not listed) - Allergy  Shot bp up      Atorvastatin Other (See Comments)    Bupropion Other (See Comments)    Clonidine Other (See Comments)    Doxazosin Other (See Comments)    Hydrochlorothiazide Other (See Comments)    Lisinopril Other (See Comments)    Metoprolol Other (See Comments)    Simvastatin Other (See Comments)     Prior to Admit Medications:  Current Outpatient Medications   Medication Instructions    allopurinol (ZYLOPRIM) 100 mg, Oral, DAILY    aspirin 81 mg, Oral, DAILY    atorvastatin (LIPITOR) 10 mg, Oral, DAILY    Cetirizine HCl (ZYRTEC ALLERGY) 10 MG CAPS Oral    dabigatran (PRADAXA) 150 mg, Oral, 2 TIMES DAILY    famotidine (PEPCID) 20 mg, Oral, 2 TIMES DAILY    losartan (COZAAR) 50 MG tablet Oral, DAILY    meclizine (ANTIVERT) 25 mg, Oral, DAILY PRN         Objective:   Patient Vitals for the past 24 hrs:   Temp Pulse Resp BP SpO2   02/24/23 0748 97.9 °F (36.6 °C) 66 18 115/76 93 %       Oxygen Therapy  SpO2: 93 %  O2 Device: None (Room air)    Estimated body mass index is 29.05 kg/m² as calculated from the following:    Height as of 2/23/23: 5' 6\" (1.676 m). Weight as of 2/23/23: 180 lb (81.6 kg).   No intake or output data in the 24 hours ending 02/24/23 0811      Physical Exam:     Blood pressure 115/76, pulse 66, temperature 97.9 °F (36.6 °C), temperature source Oral, resp. rate 18, SpO2 93 %. General:    Well nourished. Currently x3. No distress    Head:  Normocephalic, atraumatic  Eyes:  Sclerae appear normal.  Pupils equally round. ENT:  Nares appear normal.  Moist oral mucosa  Neck:  No restricted ROM. Trachea midline   CV:   RRR. No m/r/g. No jugular venous distension. Lungs:   CTAB. No wheezing, rhonchi, or rales. Symmetric expansion. Able to take deep breaths  Abdomen:   Soft, nontender, nondistended. Extremities: No cyanosis or clubbing. No edema  No calf tenderness  Skin:     No rashes and normal coloration. Warm and dry. Neuro:  CN II-XII grossly intact. Sensation intact. Psych:  Normal mood and affect.       I have personally reviewed labs and tests:  Recent Labs:  Recent Results (from the past 24 hour(s))   CBC with Auto Differential    Collection Time: 02/23/23  9:58 PM   Result Value Ref Range    WBC 8.7 4.3 - 11.1 K/uL    RBC 3.54 (L) 4.23 - 5.60 M/uL    Hemoglobin 11.6 (L) 13.6 - 17.2 g/dL    Hematocrit 35.4 (L) 41.1 - 50.3 %    .0 82.0 - 102.0 FL    MCH 32.8 26.1 - 32.9 PG    MCHC 32.8 31.4 - 35.0 g/dL    RDW 14.0 11.9 - 14.6 %    Platelets 509 116 - 376 K/uL    MPV 9.0 (L) 9.4 - 12.3 FL    nRBC 0.00 0.0 - 0.2 K/uL    Differential Type AUTOMATED      Seg Neutrophils 79 (H) 43 - 78 %    Lymphocytes 8 (L) 13 - 44 %    Monocytes 12 4.0 - 12.0 %    Eosinophils % 0 (L) 0.5 - 7.8 %    Basophils 1 0.0 - 2.0 %    Immature Granulocytes 1 0.0 - 5.0 %    Segs Absolute 6.9 1.7 - 8.2 K/UL    Absolute Lymph # 0.7 0.5 - 4.6 K/UL    Absolute Mono # 1.0 0.1 - 1.3 K/UL    Absolute Eos # 0.0 0.0 - 0.8 K/UL    Basophils Absolute 0.1 0.0 - 0.2 K/UL    Absolute Immature Granulocyte 0.1 0.0 - 0.5 K/UL   Comprehensive Metabolic Panel    Collection Time: 02/23/23  9:58 PM   Result Value Ref Range    Sodium 138 133 - 143 mmol/L    Potassium 4.2 3.5 - 5.1 mmol/L    Chloride 101 98 - 107 mmol/L    CO2 24 21 - 32 mmol/L    Anion Gap 13 (H) 2 - 11 mmol/L Glucose 109 (H) 65 - 100 mg/dL    BUN 22 (H) 7.0 - 18.0 MG/DL    Creatinine 1.02 0.8 - 1.5 MG/DL    Est, Glom Filt Rate >60 >60 ml/min/1.73m2    Calcium 9.5 8.3 - 10.4 MG/DL    Total Bilirubin 0.5 0.2 - 1.1 MG/DL    ALT 43 13.0 - 61.0 U/L    AST 53 (H) 15 - 37 U/L    Alk Phosphatase 85 45.0 - 117.0 U/L    Total Protein 7.4 6.4 - 8.2 g/dL    Albumin 4.0 3.2 - 4.6 g/dL    Globulin 3.4 2.8 - 4.5 g/dL    Albumin/Globulin Ratio 1.2 0.4 - 1.6     Magnesium    Collection Time: 02/23/23  9:58 PM   Result Value Ref Range    Magnesium 2.1 1.2 - 2.6 mg/dL   Troponin T    Collection Time: 02/23/23  9:58 PM   Result Value Ref Range    Troponin T 21.3 0 - 22 ng/L   Lactic Acid    Collection Time: 02/23/23  9:58 PM   Result Value Ref Range    Lactic Acid 1.20 0.4 - 2.0 mmol/L   Lipase    Collection Time: 02/23/23  9:58 PM   Result Value Ref Range    Lipase 8 (L) 13 - 60 U/L   EKG 12 Lead    Collection Time: 02/23/23  9:58 PM   Result Value Ref Range    Ventricular Rate 92 BPM    Atrial Rate 91 BPM    P-R Interval 157 ms    QRS Duration 97 ms    Q-T Interval 343 ms    QTc Calculation (Bazett) 425 ms    P Axis 40 degrees    R Axis 51 degrees    T Axis 5 degrees    Diagnosis       Poor data quality may adversely effect interpretation   Procalcitonin    Collection Time: 02/23/23 10:10 PM   Result Value Ref Range    Procalcitonin 0.10 0.00 - 0.49 ng/mL   Influenza A/B, Molecular    Collection Time: 02/23/23 11:47 PM    Specimen: Nasal   Result Value Ref Range    Influenza A, VIANEY Not detected      Influenza B, VIANEY Not detected     COVID-19, Rapid    Collection Time: 02/23/23 11:47 PM    Specimen: Nasopharyngeal   Result Value Ref Range    Source Nasopharyngeal      SARS-CoV-2, Rapid Detected (A) NOTD     Lactic Acid    Collection Time: 02/24/23  1:57 AM   Result Value Ref Range    Lactic Acid 1.01 0.4 - 2.0 mmol/L   POCT lactic acid (lactate)    Collection Time: 02/24/23  2:08 AM   Result Value Ref Range    POC Lactic Acid 1.01 0.5 - 1.9 mmol/L   Urinalysis w rflx microscopic    Collection Time: 02/24/23  6:14 AM   Result Value Ref Range    Color, UA YELLOW      Appearance CLEAR      Specific Gravity, UA 1.025 (H) 1.001 - 1.023      pH, Urine 5.5 5.0 - 9.0      Protein, UA 30 (A) NEG mg/dL    Glucose, UA Negative mg/dL    Ketones, Urine Negative NEG mg/dL    Bilirubin Urine Negative NEG      Blood, Urine Trace Intact (A) NEG      Urobilinogen, Urine 0.2 0.2 - 1.0 EU/dL    Nitrite, Urine Negative NEG      Leukocyte Esterase, Urine Negative NEG     Urinalysis, Micro    Collection Time: 02/24/23  6:14 AM   Result Value Ref Range    WBC, UA 0 0 /hpf    RBC, UA 0-3 0 /hpf    Epithelial Cells UA 0-3 0 /hpf    BACTERIA, URINE TRACE 0 /hpf    Casts 0 0 /lpf    Crystals 0 0 /LPF    Mucus, UA TRACE 0 /lpf    Other observations RESULTS VERIFIED MANUALLY         I have personally reviewed imaging studies:  CT Head W/O Contrast    Result Date: 2/23/2023  EXAMINATION: CT HEAD WO CONTRAST, CT CERVICAL SPINE WO CONTRAST DATE: 2/23/2023 10:15 PM  INDICATION: Fall, head and neck pain, anticoagulated  COMPARISON: None available. TECHNIQUE: Noncontrast CT of the head with coronal reformats. Noncontrast cervical spine CT with coronal and sagittal reformats. CT dose lowering techniques were used, to include: automated exposure control, adjustment for patient size, and or use of iterative reconstruction. Head CT findings:  Intracranial contents: No acute intracranial hemorrhage, evidence of acute territorial infarct, mass, mass effect or hydrocephalus. Cerebral volume is normal for age. Gray-white differentiation is preserved. Bones and extracranial soft tissues:  No fracture or focal osseous lesion in the calvarium or skull base. Paranasal sinuses are clear where visualized. Mastoid air cells and middle ear cavities are clear bilaterally. No acute findings in the orbits. Cervical spine CT findings: No cervical spine fracture. No focal osseous lesions.  Moderate and large disc-osteophyte bulges throughout the cervical spine and diffuse markedly severe bilateral facet arthrosis. Large osteophytes between the  lateral masses of C1 and C2 at the craniocervical junction. Diffuse high-grade spinal canal and foraminal stenosis. Mild levoscoliosis. Bilateral carotid atherosclerosis. Masslike density in the left upper lobe partially visualized      1. No acute intracranial abnormality. 2. No fracture of the calvarium, skull base or cervical spine. 3. Moderate chronic age-related intracranial findings as above. 4. Markedly extensive degenerative changes of the cervical spine. 5. Masslike density in the partially visualized left upper lobe. This examination was interpreted by ROMARIO Garcia M.D. 2/23/2023 11:04:00 PM    CT CSpine W/O Contrast    Result Date: 2/23/2023  EXAMINATION: CT HEAD WO CONTRAST, CT CERVICAL SPINE WO CONTRAST DATE: 2/23/2023 10:15 PM  INDICATION: Fall, head and neck pain, anticoagulated  COMPARISON: None available. TECHNIQUE: Noncontrast CT of the head with coronal reformats. Noncontrast cervical spine CT with coronal and sagittal reformats. CT dose lowering techniques were used, to include: automated exposure control, adjustment for patient size, and or use of iterative reconstruction. Head CT findings:  Intracranial contents: No acute intracranial hemorrhage, evidence of acute territorial infarct, mass, mass effect or hydrocephalus. Cerebral volume is normal for age. Gray-white differentiation is preserved. Bones and extracranial soft tissues:  No fracture or focal osseous lesion in the calvarium or skull base. Paranasal sinuses are clear where visualized. Mastoid air cells and middle ear cavities are clear bilaterally. No acute findings in the orbits. Cervical spine CT findings: No cervical spine fracture. No focal osseous lesions.  Moderate and large disc-osteophyte bulges throughout the cervical spine and diffuse markedly severe bilateral facet arthrosis. Large osteophytes between the  lateral masses of C1 and C2 at the craniocervical junction. Diffuse high-grade spinal canal and foraminal stenosis. Mild levoscoliosis. Bilateral carotid atherosclerosis. Masslike density in the left upper lobe partially visualized      1. No acute intracranial abnormality. 2. No fracture of the calvarium, skull base or cervical spine. 3. Moderate chronic age-related intracranial findings as above. 4. Markedly extensive degenerative changes of the cervical spine. 5. Masslike density in the partially visualized left upper lobe. This examination was interpreted by Severo Leyva M.D. Severo Leyva M.D. 2/23/2023 11:04:00 PM    XR CHEST PORTABLE    Result Date: 2/23/2023  EXAM:  XR CHEST PORTABLE   DATE: 2/23/2023 10:02 PM HISTORY:   Shortness of Breath weakness COMPARISON:  2/19/2023. FINDINGS and     1. Mild progression of heterogeneous airspace opacity left mid to upper lung. Slightly more conspicuous opacity right midlung (infection, aspiration, edema, atelectasis) 2. Heart size is normal. 3.  At least moderate narrowing of the right AC joint. Herlinda Marinelli M.D. 2/23/2023 10:22:00 PM      Echocardiogram:  No results found for this or any previous visit. Orders Placed This Encounter   Medications    sodium chloride flush 0.9 % injection 5-40 mL    sodium chloride flush 0.9 % injection 5-40 mL    0.9 % sodium chloride infusion    enoxaparin (LOVENOX) injection 40 mg     Order Specific Question:   Indication of Use     Answer:   Prophylaxis-DVT/PE    OR Linked Order Group     ondansetron (ZOFRAN-ODT) disintegrating tablet 4 mg     ondansetron (ZOFRAN) injection 4 mg    polyethylene glycol (GLYCOLAX) packet 17 g    OR Linked Order Group     acetaminophen (TYLENOL) tablet 650 mg     acetaminophen (TYLENOL) suppository 650 mg         Signed:  Traci Chi MD    Part of this note may have been written by using a voice dictation software. The note has been proof read but may still contain some grammatical/other typographical errors.

## 2023-02-24 NOTE — ED NOTES
TRANSFER - OUT REPORT:    Verbal report given to CHIRAG LAWRENCE RN on Jackson Lima  being transferred to [address] [place] [address] for routine progression of patient care       Report consisted of patient's Situation, Background, Assessment and   Recommendations(SBAR). Information from the following report(s) Nurse Handoff Report, ED Encounter Summary, ED SBAR, Adult Overview, MAR, Recent Results, and Neuro Assessment was reviewed with the receiving nurse. Lines:   Peripheral IV 02/23/23 Right; Anterior Forearm (Active)       Peripheral IV 02/23/23 Left Forearm (Active)   Site Assessment Clean, dry & intact 02/23/23 2217   Line Status Blood return noted; Flushed 02/23/23 2217   Phlebitis Assessment No symptoms 02/23/23 2217   Infiltration Assessment 0 02/23/23 2217   Alcohol Cap Used No 02/23/23 2217   Dressing Status New dressing applied;Clean, dry & intact 02/23/23 2217   Dressing Type Transparent 02/23/23 2217   Dressing Intervention New 02/23/23 2217        Opportunity for questions and clarification was provided.       Patient transported with:  Monitor  O2 @ 2lpm      Maru Soliz RN  02/24/23 [id number]

## 2023-02-25 VITALS
SYSTOLIC BLOOD PRESSURE: 105 MMHG | OXYGEN SATURATION: 93 % | TEMPERATURE: 97.2 F | HEART RATE: 73 BPM | DIASTOLIC BLOOD PRESSURE: 75 MMHG | RESPIRATION RATE: 19 BRPM

## 2023-02-25 LAB
ANION GAP SERPL CALC-SCNC: 6 MMOL/L (ref 2–11)
BASOPHILS # BLD: 0 K/UL (ref 0–0.2)
BASOPHILS NFR BLD: 0 % (ref 0–2)
BUN SERPL-MCNC: 36 MG/DL (ref 8–23)
CALCIUM SERPL-MCNC: 9.2 MG/DL (ref 8.3–10.4)
CHLORIDE SERPL-SCNC: 106 MMOL/L (ref 101–110)
CO2 SERPL-SCNC: 24 MMOL/L (ref 21–32)
CREAT SERPL-MCNC: 1.1 MG/DL (ref 0.8–1.5)
DIFFERENTIAL METHOD BLD: ABNORMAL
EOSINOPHIL # BLD: 0 K/UL (ref 0–0.8)
EOSINOPHIL NFR BLD: 0 % (ref 0.5–7.8)
ERYTHROCYTE [DISTWIDTH] IN BLOOD BY AUTOMATED COUNT: 13.7 % (ref 11.9–14.6)
GLUCOSE SERPL-MCNC: 120 MG/DL (ref 65–100)
HCT VFR BLD AUTO: 31.7 % (ref 41.1–50.3)
HGB BLD-MCNC: 10.3 G/DL (ref 13.6–17.2)
IMM GRANULOCYTES # BLD AUTO: 0 K/UL (ref 0–0.5)
IMM GRANULOCYTES NFR BLD AUTO: 0 % (ref 0–5)
LYMPHOCYTES # BLD: 0.9 K/UL (ref 0.5–4.6)
LYMPHOCYTES NFR BLD: 8 % (ref 13–44)
MCH RBC QN AUTO: 32.6 PG (ref 26.1–32.9)
MCHC RBC AUTO-ENTMCNC: 32.5 G/DL (ref 31.4–35)
MCV RBC AUTO: 100.3 FL (ref 82–102)
MONOCYTES # BLD: 0.9 K/UL (ref 0.1–1.3)
MONOCYTES NFR BLD: 8 % (ref 4–12)
NEUTS SEG # BLD: 8.7 K/UL (ref 1.7–8.2)
NEUTS SEG NFR BLD: 84 % (ref 43–78)
NRBC # BLD: 0 K/UL (ref 0–0.2)
PLATELET # BLD AUTO: 185 K/UL (ref 150–450)
PMV BLD AUTO: 9.7 FL (ref 9.4–12.3)
POTASSIUM SERPL-SCNC: 4.3 MMOL/L (ref 3.5–5.1)
RBC # BLD AUTO: 3.16 M/UL (ref 4.23–5.6)
SODIUM SERPL-SCNC: 136 MMOL/L (ref 133–143)
WBC # BLD AUTO: 10.5 K/UL (ref 4.3–11.1)

## 2023-02-25 PROCEDURE — 2580000003 HC RX 258: Performed by: INTERNAL MEDICINE

## 2023-02-25 PROCEDURE — 85025 COMPLETE CBC W/AUTO DIFF WBC: CPT

## 2023-02-25 PROCEDURE — 80048 BASIC METABOLIC PNL TOTAL CA: CPT

## 2023-02-25 PROCEDURE — 6370000000 HC RX 637 (ALT 250 FOR IP): Performed by: INTERNAL MEDICINE

## 2023-02-25 PROCEDURE — 36415 COLL VENOUS BLD VENIPUNCTURE: CPT

## 2023-02-25 RX ORDER — LOSARTAN POTASSIUM 50 MG/1
25 TABLET ORAL DAILY
Qty: 30 TABLET | Refills: 3 | Status: SHIPPED | OUTPATIENT
Start: 2023-02-25

## 2023-02-25 RX ORDER — LEVOFLOXACIN 500 MG/1
500 TABLET, FILM COATED ORAL DAILY
Qty: 3 TABLET | Refills: 0 | Status: SHIPPED | OUTPATIENT
Start: 2023-02-26 | End: 2023-03-01

## 2023-02-25 RX ADMIN — DABIGATRAN ETEXILATE MESYLATE 150 MG: 150 CAPSULE ORAL at 08:13

## 2023-02-25 RX ADMIN — ATORVASTATIN CALCIUM 10 MG: 10 TABLET, FILM COATED ORAL at 08:13

## 2023-02-25 RX ADMIN — ASPIRIN 81 MG 81 MG: 81 TABLET ORAL at 08:13

## 2023-02-25 RX ADMIN — SODIUM CHLORIDE, PRESERVATIVE FREE 5 ML: 5 INJECTION INTRAVENOUS at 08:13

## 2023-02-25 RX ADMIN — LEVOFLOXACIN 500 MG: 500 TABLET, FILM COATED ORAL at 08:13

## 2023-02-25 RX ADMIN — ALLOPURINOL 100 MG: 100 TABLET ORAL at 08:13

## 2023-02-25 RX ADMIN — FAMOTIDINE 20 MG: 20 TABLET ORAL at 08:13

## 2023-02-25 NOTE — DISCHARGE SUMMARY
Hospitalist Discharge Summary   Admit Date:  2023  6:45 AM   DC Note date: 2023  Name:  Leida Nevarez   Age:  81 y.o. Sex:  male  :  1935   MRN:  [id number]   Room:  [facility]  PCP:  Paco Canseco MD    Presenting Complaint: No chief complaint on file. Initial Admission Diagnosis: Hypoxia [R09.02]     Problem List for this Hospitalization (present on admission):    Principal Problem:    Hypoxia  Resolved Problems:    * No resolved hospital problems. *      Hospital Course:  Leida Nevarez is a 81 y.o. male with medical history of  lung CVA, PE, HTN, GERD, dyslipidemia recently discharged on 2023 from [address] [place] [address] -- hospital seeking medical attention due to fever, shortness of breath and was diagnosed with COVID  --patient was seen by both pulmonary and oncology during last hospitalization     Discharge summary from 2022 reviewed -medical history of PE on Eliquis, FLETCHER neoplasm currently in remission, HLD, HTN who presented with elevated heart rate noted on home monitor. CT chested found left upper lobe pneumonia and mural thrombus within the left upper lobe pulmonary artery. He was started on heparin gtt. On RA. Received Rocephin/azithromycin in ED. Is followed at [facility].   had CT chest with Pulmonary at [facility] Dr. Disla, findings of left upper lobe inferior consolidation vs pneumonia vs tumor invasion. Plans to repeat CT chest in 4 weeks. Hematology and Pulmonary consulted. 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 Hematology recommended pradaxa for life  -     Per ED documentation patient had a temp of 100.4, hypoxia and needed 2 L supplemental oxygen. Patient has no fever, normal WBC. On room oxygen ambulating in the room. Patient's primary oncologist is in  [facility]     CT head no acute intracranial abnormality  CT C-spine-extensive degenerative changes of the C-spine. Patient is hemodynamically stable for discharge.   Disposition: home   Diet: ADULT DIET; Regular  Code Status: Full Code    Follow Ups:      Time spent in patient discharge and coordination 35 minutes. Follow up labs/diagnostics (ultimately defer to outpatient provider): Follow-up with PCP in 3 to 5 days    Plan was discussed with patient. All questions answered. Patient was stable at time of discharge. Instructions given to call a physician or return if any concerns. Medication List        START taking these medications      levoFLOXacin 500 MG tablet  Commonly known as: LEVAQUIN  Take 1 tablet by mouth daily for 3 doses  Start taking on: February 26, 2023            CHANGE how you take these medications      losartan 50 MG tablet  Commonly known as: COZAAR  Take 0.5 tablets by mouth daily  What changed: how much to take            CONTINUE taking these medications      allopurinol 100 MG tablet  Commonly known as: ZYLOPRIM  Take 1 tablet by mouth daily     aspirin 81 MG chewable tablet     atorvastatin 20 MG tablet  Commonly known as: LIPITOR     dabigatran 150 MG capsule  Commonly known as: PRADAXA  Take 1 capsule by mouth 2 times daily     famotidine 20 MG tablet  Commonly known as: PEPCID  Take 1 tablet by mouth 2 times daily     meclizine 25 MG tablet  Commonly known as: ANTIVERT  Take 1 tablet by mouth daily as needed for Dizziness     ZyrTEC Allergy 10 MG Caps  Generic drug: Cetirizine HCl               Where to Get Your Medications        These medications were sent to [id number] [address] [facility] [phone]  [place] [address]      Phone: [phone]   levoFLOXacin 500 MG tablet  losartan 50 MG tablet            Some medications may have been reported old/obsolete and marked \"stop taking\" by the system; in reality pt was already off these meds; defer to outpatient or prescribing providers.     Procedures done this admission:  * No surgery found *    Consults this admission:  IP CONSULT TO PHARMACY  IP CONSULT HOME HEALTH    Echocardiogram results:  No results found for this or any previous visit. Diagnostic Imaging/Tests:   CT Head W/O Contrast    Result Date: 2/23/2023  1. No acute intracranial abnormality. 2. No fracture of the calvarium, skull base or cervical spine. 3. Moderate chronic age-related intracranial findings as above. 4. Markedly extensive degenerative changes of the cervical spine. 5. Masslike density in the partially visualized left upper lobe. This examination was interpreted by ROMARIO Lawrence M.D. 2/23/2023 11:04:00 PM    CT CSpine W/O Contrast    Result Date: 2/23/2023  1. No acute intracranial abnormality. 2. No fracture of the calvarium, skull base or cervical spine. 3. Moderate chronic age-related intracranial findings as above. 4. Markedly extensive degenerative changes of the cervical spine. 5. Masslike density in the partially visualized left upper lobe. This examination was interpreted by ROMARIO Lawrence M.D. 2/23/2023 11:04:00 PM    XR CHEST PORTABLE    Result Date: 2/23/2023  1. Mild progression of heterogeneous airspace opacity left mid to upper lung. Slightly more conspicuous opacity right midlung (infection, aspiration, edema, atelectasis) 2. Heart size is normal. 3.  At least moderate narrowing of the right AC joint. Jose Alejandro Rosas M.D. 2/23/2023 10:22:00 PM    XR CHEST PORTABLE    Result Date: 2/19/2023  Patchy airspace disease of the lung bases, left greater than right that either represents pneumonia or atelectasis. Kendrick Boo M.D. 2/19/2023 7:49:00 AM    Vascular duplex lower extremity venous bilateral    Result Date: 2/19/2023  1. No evidence of deep venous thrombosis in either lower extremity.         Labs: Results:       BMP, Mg, Phos Recent Labs     02/23/23  2158 02/25/23  0313    136   K 4.2 4.3    106   CO2 24 24   ANIONGAP 13* 6   BUN 22* 36*   CREATININE 1.02 1.10   LABGLOM >60 >60   CALCIUM 9.5 9.2   GLUCOSE 109* 120*   MG 2.1  --       CBC Recent Labs     02/23/23 2158 02/25/23 0313   WBC 8.7 10.5   RBC 3.54* 3.16*   HGB 11.6* 10.3*   HCT 35.4* 31.7*   .0 100.3   MCH 32.8 32.6   MCHC 32.8 32.5   RDW 14.0 13.7    185   MPV 9.0* 9.7   NRBC 0.00 0.00   SEGS 79* 84*   LYMPHOPCT 8* 8*   EOSRELPCT 0* 0*   MONOPCT 12 8   BASOPCT 1 0   IMMGRAN 1 0   SEGSABS 6.9 8.7*   LYMPHSABS 0.7 0.9   EOSABS 0.0 0.0   MONOSABS 1.0 0.9   BASOSABS 0.1 0.0   ABSIMMGRAN 0.1 0.0      LFT Recent Labs     02/23/23 2158   BILITOT 0.5   ALKPHOS 85   AST 53*   ALT 43   PROT 7.4   LABALBU 4.0   GLOB 3.4      Cardiac  Lab Results   Component Value Date/Time    NTPROBNP 132 02/19/2023 07:06 AM    TROPHS 9.9 02/19/2023 09:36 AM    TROPHS 9.0 02/19/2023 07:06 AM    TROPHS 9.2 10/20/2022 11:40 PM      Coags Lab Results   Component Value Date/Time    PROTIME 16.2 02/19/2023 09:36 AM    INR 1.3 02/19/2023 09:36 AM    APTT 80.2 02/22/2023 05:35 AM    APTT 93.7 02/20/2023 11:56 PM    APTT 111.6 02/20/2023 05:46 PM      A1c No results found for: LABA1C, EAG   Lipids No results found for: CHOL, LDLCALC, LABVLDL, HDL, CHOLHDLRATIO, TRIG   Thyroid  Lab Results   Component Value Date/Time    TSHELE 2.95 02/19/2023 07:06 AM    HGG7YCM 2.750 02/13/2023 03:08 PM        Most Recent UA Lab Results   Component Value Date/Time    COLORU YELLOW 02/24/2023 06:14 AM    APPEARANCE CLEAR 02/24/2023 06:14 AM    SPECGRAV 1.025 02/24/2023 06:14 AM    LABPH 5.5 02/24/2023 06:14 AM    PROTEINU 30 02/24/2023 06:14 AM    GLUCOSEU Negative 02/24/2023 06:14 AM    KETUA Negative 02/24/2023 06:14 AM    BILIRUBINUR Negative 02/24/2023 06:14 AM    BLOODU Trace Intact 02/24/2023 06:14 AM    UROBILINOGEN 0.2 02/24/2023 06:14 AM    NITRU Negative 02/24/2023 06:14 AM    LEUKOCYTESUR Negative 02/24/2023 06:14 AM    WBCUA 0 02/24/2023 06:14 AM    RBCUA 0-3 02/24/2023 06:14 AM    EPITHUA 0-3 02/24/2023 06:14 AM    BACTERIA TRACE 02/24/2023 06:14 AM    LABCAST 0 02/24/2023 06:14 AM    MUCUS TRACE 02/24/2023 06:14 AM        Recent Labs     02/24/23  0949 02/23/23 2219 02/23/23 2210   CULTURE SA target not detected. A MRSA NEGATIVE, SA NEGATIVE test result does not preclude MRSA or SA nasal colonization. NO GROWTH AFTER 22 HOURS NO GROWTH AFTER 22 HOURS       All Labs from Last 24 Hrs:  Recent Results (from the past 24 hour(s))   Basic Metabolic Panel w/ Reflex to MG    Collection Time: 02/25/23  3:13 AM   Result Value Ref Range    Sodium 136 133 - 143 mmol/L    Potassium 4.3 3.5 - 5.1 mmol/L    Chloride 106 101 - 110 mmol/L    CO2 24 21 - 32 mmol/L    Anion Gap 6 2 - 11 mmol/L    Glucose 120 (H) 65 - 100 mg/dL    BUN 36 (H) 8 - 23 MG/DL    Creatinine 1.10 0.8 - 1.5 MG/DL    Est, Glom Filt Rate >60 >60 ml/min/1.73m2    Calcium 9.2 8.3 - 10.4 MG/DL   CBC with Auto Differential    Collection Time: 02/25/23  3:13 AM   Result Value Ref Range    WBC 10.5 4.3 - 11.1 K/uL    RBC 3.16 (L) 4.23 - 5.6 M/uL    Hemoglobin 10.3 (L) 13.6 - 17.2 g/dL    Hematocrit 31.7 (L) 41.1 - 50.3 %    .3 82 - 102 FL    MCH 32.6 26.1 - 32.9 PG    MCHC 32.5 31.4 - 35.0 g/dL    RDW 13.7 11.9 - 14.6 %    Platelets 719 532 - 678 K/uL    MPV 9.7 9.4 - 12.3 FL    nRBC 0.00 0.0 - 0.2 K/uL    Differential Type AUTOMATED      Seg Neutrophils 84 (H) 43 - 78 %    Lymphocytes 8 (L) 13 - 44 %    Monocytes 8 4.0 - 12.0 %    Eosinophils % 0 (L) 0.5 - 7.8 %    Basophils 0 0.0 - 2.0 %    Immature Granulocytes 0 0.0 - 5.0 %    Segs Absolute 8.7 (H) 1.7 - 8.2 K/UL    Absolute Lymph # 0.9 0.5 - 4.6 K/UL    Absolute Mono # 0.9 0.1 - 1.3 K/UL    Absolute Eos # 0.0 0.0 - 0.8 K/UL    Basophils Absolute 0.0 0.0 - 0.2 K/UL    Absolute Immature Granulocyte 0.0 0.0 - 0.5 K/UL       Allergies   Allergen Reactions    Amlodipine Other (See Comments)     Other reaction(s):  Other- (not listed) - Allergy  Shot bp up      Atorvastatin Other (See Comments) Bupropion Other (See Comments)    Clonidine Other (See Comments)    Doxazosin Other (See Comments)    Hydrochlorothiazide Other (See Comments)    Lisinopril Other (See Comments)    Metoprolol Other (See Comments)    Simvastatin Other (See Comments)     Immunization History   Administered Date(s) Administered    Influenza Virus Vaccine 10/05/2020    Influenza, Triv, inactivated, subunit, adjuvanted, IM (Fluad 65 yrs and older) 11/20/2017, 09/19/2018, 10/24/2019    PPD Test 05/11/2020       Recent Vital Data:  Patient Vitals for the past 24 hrs:   Temp Pulse Resp BP SpO2   02/25/23 1044 97.2 °F (36.2 °C) 73 -- 105/75 93 %   02/25/23 0709 98.2 °F (36.8 °C) 66 19 114/71 96 %   02/25/23 0416 97.5 °F (36.4 °C) 72 18 117/81 96 %   02/25/23 0003 97.6 °F (36.4 °C) 84 16 116/81 95 %   02/24/23 1938 97.9 °F (36.6 °C) 87 16 125/82 92 %   02/24/23 1445 97.5 °F (36.4 °C) 84 17 126/71 95 %       Oxygen Therapy  SpO2: 93 %  O2 Device: None (Room air)    Estimated body mass index is 29.05 kg/m² as calculated from the following:    Height as of 2/23/23: 5' 6\" (1.676 m). Weight as of 2/23/23: 180 lb (81.6 kg). Intake/Output Summary (Last 24 hours) at 2/25/2023 1234  Last data filed at 2/25/2023 0498  Gross per 24 hour   Intake 720 ml   Output --   Net 720 ml         Physical Exam:    General:    Well nourished. No overt distress  Head:  Normocephalic, atraumatic  Eyes:  Sclerae appear normal.  Pupils equally round. HENT:  Nares appear normal, no drainage. Moist mucous membranes  Neck:  No restricted ROM. Trachea midline  CV:   RRR. No m/r/g. No JVD  Lungs:   CTAB. No wheezing, rhonchi, or rales. Respirations even, unlabored  Abdomen:   Soft, nontender, nondistended. Extremities: Warm and dry. No cyanosis or clubbing. No edema. Skin:     No rashes. Normal coloration  Neuro:  CN II-XII grossly intact. Psych:  Normal mood and affect.     Signed:  Marcus Angela MD    Part of this note may have been written by using a voice dictation software. The note has been proof read but may still contain some grammatical/other typographical errors.

## 2023-02-25 NOTE — PROGRESS NOTES
Pt given discharge paperwork. All information reviewed with pt who verbalizes understanding. PIVs removed. Tele box removed. Pending family to  for transport home.

## 2023-02-26 NOTE — CARE COORDINATION
Patient has discharge orders for today. Pervious CM has made a referral to [facility]. Referral has been accepted and selected. Family will transport patient home. Patient has met all treatment goals / milestones. CM will continue to follow and remain available for any needs, concerns or questions that may arise. 02/24/23 0935   Service Assessment   Patient Orientation Alert and Oriented   Cognition Alert   History Provided By Patient   Primary Caregiver Self   Support Systems Spouse/Significant Other   PCP Verified by CM Yes   Prior Functional Level Assistance with the following:;Housework   Current Functional Level Assistance with the following:   Can patient return to prior living arrangement Unknown at present   Ability to make needs known: Good   Family able to assist with home care needs: No   Would you like for me to discuss the discharge plan with any other family members/significant others, and if so, who?  Yes   Financial Resources Medicare   Social/Functional History   Lives With Spouse   Type of [address] One level   ADL Assistance Independent   Homemaking Assistance Needs assistance   Homemaking Responsibilities Yes   Ambulation Assistance Needs assistance   Transfer Assistance Independent   Active  Yes   Mode of Transportation Car

## 2023-02-27 ENCOUNTER — CARE COORDINATION (OUTPATIENT)
Dept: CARE COORDINATION | Facility: CLINIC | Age: 88
End: 2023-02-27

## 2023-03-01 ENCOUNTER — TELEPHONE (OUTPATIENT)
Dept: INTERNAL MEDICINE CLINIC | Facility: CLINIC | Age: 88
End: 2023-03-01

## 2023-03-06 ENCOUNTER — CARE COORDINATION (OUTPATIENT)
Dept: CARE COORDINATION | Facility: CLINIC | Age: 88
End: 2023-03-06

## 2023-03-06 NOTE — CARE COORDINATION
Care Transitions Outreach Attempt    Call within 2 business days of discharge: Yes   Attempted to reach patient for transitions of care follow up. Unable to reach patient. Will attempt to contact patient next business day. Patient: Pancho Ascencio Patient : 1935 MRN: [id number]    Last Discharge [address]       Date Complaint Diagnosis Description Type Department Provider    23   Admission (Discharged) Ester Elias MD              Was this an external facility discharge?  No Discharge Facility: [address] [place] [address]    Noted following upcoming appointments from discharge chart review:   [facility] follow up appointment(s):   Future Appointments   Date Time Provider Abundio Correa   2023 11:40 AM Sheldon Betancourt MD [facility]     Non-[facility] follow up appointment(s): n/a

## 2023-03-07 ENCOUNTER — CARE COORDINATION (OUTPATIENT)
Dept: CARE COORDINATION | Facility: CLINIC | Age: 88
End: 2023-03-07

## 2023-03-07 NOTE — CARE COORDINATION
[facility] Care Transitions Follow Up Call    Patient Current Location:  Home: [address] [place] [address] Dr Naomi Delong [id number]    [facility] Care Coordinator contacted the patient by telephone to follow up after admission on 2025. Verified name and  with patient as identifiers. Patient: Rodríguez Jones  Patient : 1935   MRN: [id number]  Reason for Admission: Hypoxia  Discharge Date: 23 RARS: Readmission Risk Score: 18.4      Needs to be reviewed by the provider   Additional needs identified to be addressed with provider: No  none             Method of communication with provider: none. Spoke with patient states fine. Patient denies any increased shortness of breath. Patient denies any concerns during this phone call. This Care Coordinator will graduate this patient from this program.    Addressed changes since last contact:  none  Discussed follow-up appointments. If no appointment was previously scheduled, appointment scheduling offered: Yes. Is follow up appointment scheduled within 7 days of discharge? Yes. Follow Up  Future Appointments   Date Time Provider Abundio Correa   2023 11:40 AM [facility]     [facility] follow up appointment(s): n/a    LP[facility] Care Coordinator reviewed medical action plan with patient and discussed any barriers to care and/or understanding of plan of care after discharge. Discussed appropriate site of care based on symptoms and resources available to patient including: PCP  Specialist  When to call 911. The patient agrees to contact the PCP office for questions related to their healthcare. Advance Care Planning:   decision maker updated.      Patients top risk factors for readmission: medical condition-Hypoxia  Interventions to address risk factors: Assessment and support for treatment adherence and medication management-     Offered patient enrollment in the Remote Patient Monitoring (RPM) program for in-home monitoring: NA.     Care Transitions Subsequent and Final Call    Subsequent and Final Calls  Do you have any ongoing symptoms?: No  Have your medications changed?: No  Do you have any questions related to your medications?: No  Do you currently have any active services?: No  Do you have any needs or concerns that I can assist you with?: No  Identified Barriers: None  Care Transitions Interventions  Other Interventions:            Goals Addressed                   This Visit's Progress     Medication Management   On track     I will take my medication as directed. Barriers: none  Plan for overcoming my barriers: Spouse states patient will take all medication as directed. Confidence: 9/10  Anticipated Goal Completion Date: within 30 days               LPN Care Coordinator provided contact information for future needs. No further follow-up call indicated based on severity of symptoms and risk factors.       Hector Bell LPN

## 2023-03-08 NOTE — PROGRESS NOTES
Physician Progress Note      PATIENT:               HILARY LOGAN  CSN #:                  [id number]  :                       1935  ADMIT DATE:       2023 6:45 AM  DISCH DATE:        2023 2:25 PM  RESPONDING  PROVIDER #:        Amarilis Sood MD          QUERY TEXT:    Pt admitted with COVID .  Noted documentation of PNA in notes . If possible,   please document in progress notes and discharge summary:    The medical record reflects the following:  Risk Factors: COVID, ?PNA/TUMOR, HTN, Hx of PE . Lung cancer  Clinical Indicators: Hypoxia, SOB, Wbc wnl, no productive cough, started on   vanc/cefepime, Antibiotics tapered down. Pulm did not recommend antibiotics. .   Ct found left upper lobe PNA and mural thrombus. Heparin drip. ---ilsa   doc--upper lobe consolidation vs PNA vs tumor invassion  Treatment: IV abx's which were tapered down. CT scan, cxray, pulm consult,   fluids, essential home meds.  Options provided:  -- PNA not clinically significant  -- PNA  is clinically significant  -- Other - I will add my own diagnosis  -- Disagree - Not applicable / Not valid  -- Disagree - Clinically unable to determine / Unknown  -- Refer to Clinical Documentation Reviewer    PROVIDER RESPONSE TEXT:    This patient has clinically significant PNA .    Query created by: Reid Childers on 3/2/2023 8:13 AM      Electronically signed by:  Amarilis Sood MD 3/8/2023 7:23 AM

## 2023-03-16 ENCOUNTER — TELEPHONE (OUTPATIENT)
Dept: INTERNAL MEDICINE CLINIC | Facility: CLINIC | Age: 88
End: 2023-03-16

## 2023-04-07 ENCOUNTER — OFFICE VISIT (OUTPATIENT)
Dept: INTERNAL MEDICINE CLINIC | Facility: CLINIC | Age: 88
End: 2023-04-07

## 2023-04-07 VITALS
SYSTOLIC BLOOD PRESSURE: 110 MMHG | HEART RATE: 74 BPM | BODY MASS INDEX: 29.96 KG/M2 | DIASTOLIC BLOOD PRESSURE: 68 MMHG | OXYGEN SATURATION: 97 % | WEIGHT: 185.6 LBS

## 2023-04-07 DIAGNOSIS — I10 ESSENTIAL HYPERTENSION: ICD-10-CM

## 2023-04-07 DIAGNOSIS — E78.5 HYPERLIPIDEMIA, UNSPECIFIED HYPERLIPIDEMIA TYPE: ICD-10-CM

## 2023-04-07 DIAGNOSIS — Z00.00 MEDICARE ANNUAL WELLNESS VISIT, SUBSEQUENT: Primary | ICD-10-CM

## 2023-04-07 DIAGNOSIS — D64.9 ANEMIA, UNSPECIFIED TYPE: ICD-10-CM

## 2023-04-07 DIAGNOSIS — Z71.89 ADVANCE CARE PLANNING: ICD-10-CM

## 2023-04-07 LAB
ALBUMIN SERPL-MCNC: 3.8 G/DL (ref 3.2–4.6)
ALBUMIN/GLOB SERPL: 1.2 (ref 0.4–1.6)
ALP SERPL-CCNC: 73 U/L (ref 50–136)
ALT SERPL-CCNC: 32 U/L (ref 12–65)
ANION GAP SERPL CALC-SCNC: 3 MMOL/L (ref 2–11)
AST SERPL-CCNC: 22 U/L (ref 15–37)
BASOPHILS # BLD: 0.1 K/UL (ref 0–0.2)
BASOPHILS NFR BLD: 1 % (ref 0–2)
BILIRUB SERPL-MCNC: 0.5 MG/DL (ref 0.2–1.1)
BUN SERPL-MCNC: 21 MG/DL (ref 8–23)
CALCIUM SERPL-MCNC: 9.7 MG/DL (ref 8.3–10.4)
CHLORIDE SERPL-SCNC: 106 MMOL/L (ref 101–110)
CHOLEST SERPL-MCNC: 155 MG/DL
CO2 SERPL-SCNC: 30 MMOL/L (ref 21–32)
CREAT SERPL-MCNC: 1 MG/DL (ref 0.8–1.5)
DIFFERENTIAL METHOD BLD: ABNORMAL
EOSINOPHIL # BLD: 0.4 K/UL (ref 0–0.8)
EOSINOPHIL NFR BLD: 5 % (ref 0.5–7.8)
ERYTHROCYTE [DISTWIDTH] IN BLOOD BY AUTOMATED COUNT: 14.3 % (ref 11.9–14.6)
FERRITIN SERPL-MCNC: 52 NG/ML (ref 8–388)
GLOBULIN SER CALC-MCNC: 3.1 G/DL (ref 2.8–4.5)
GLUCOSE SERPL-MCNC: 88 MG/DL (ref 65–100)
HCT VFR BLD AUTO: 38.2 % (ref 41.1–50.3)
HDLC SERPL-MCNC: 59 MG/DL (ref 40–60)
HDLC SERPL: 2.6
HGB BLD-MCNC: 12 G/DL (ref 13.6–17.2)
IMM GRANULOCYTES # BLD AUTO: 0 K/UL (ref 0–0.5)
IMM GRANULOCYTES NFR BLD AUTO: 0 % (ref 0–5)
IRON SERPL-MCNC: 84 UG/DL (ref 35–150)
LDLC SERPL CALC-MCNC: 74.8 MG/DL
LYMPHOCYTES # BLD: 1.4 K/UL (ref 0.5–4.6)
LYMPHOCYTES NFR BLD: 19 % (ref 13–44)
MCH RBC QN AUTO: 32.8 PG (ref 26.1–32.9)
MCHC RBC AUTO-ENTMCNC: 31.4 G/DL (ref 31.4–35)
MCV RBC AUTO: 104.4 FL (ref 82–102)
MONOCYTES # BLD: 0.6 K/UL (ref 0.1–1.3)
MONOCYTES NFR BLD: 8 % (ref 4–12)
NEUTS SEG # BLD: 4.9 K/UL (ref 1.7–8.2)
NEUTS SEG NFR BLD: 67 % (ref 43–78)
NRBC # BLD: 0 K/UL (ref 0–0.2)
PLATELET # BLD AUTO: 211 K/UL (ref 150–450)
PMV BLD AUTO: 9.6 FL (ref 9.4–12.3)
POTASSIUM SERPL-SCNC: 4.3 MMOL/L (ref 3.5–5.1)
PROT SERPL-MCNC: 6.9 G/DL (ref 6.3–8.2)
RBC # BLD AUTO: 3.66 M/UL (ref 4.23–5.6)
SODIUM SERPL-SCNC: 139 MMOL/L (ref 133–143)
TRIGL SERPL-MCNC: 106 MG/DL (ref 35–150)
VLDLC SERPL CALC-MCNC: 21.2 MG/DL (ref 6–23)
WBC # BLD AUTO: 7.4 K/UL (ref 4.3–11.1)

## 2023-04-07 ASSESSMENT — PATIENT HEALTH QUESTIONNAIRE - PHQ9
SUM OF ALL RESPONSES TO PHQ QUESTIONS 1-9: 2
1. LITTLE INTEREST OR PLEASURE IN DOING THINGS: 2
SUM OF ALL RESPONSES TO PHQ QUESTIONS 1-9: 2

## 2023-04-07 ASSESSMENT — ENCOUNTER SYMPTOMS
COUGH: 0
SHORTNESS OF BREATH: 0
ABDOMINAL PAIN: 0

## 2023-04-07 ASSESSMENT — LIFESTYLE VARIABLES
HOW OFTEN DO YOU HAVE A DRINK CONTAINING ALCOHOL: NEVER
HOW MANY STANDARD DRINKS CONTAINING ALCOHOL DO YOU HAVE ON A TYPICAL DAY: PATIENT DOES NOT DRINK

## 2023-04-07 NOTE — PROGRESS NOTES
deficit present. Mental Status: He is oriented to person, place, and time. Psychiatric:         Mood and Affect: Mood normal.         Behavior: Behavior normal.       Medical problems and test results were reviewed with the patient today. No results found for this or any previous visit (from the past 672 hour(s)). ASSESSMENT and PLAN    Margo Paulino was seen today for medicare awv. Diagnoses and all orders for this visit:    Medicare annual wellness visit, subsequent    Essential hypertension  -     Comprehensive Metabolic Panel; Future    Anemia, unspecified type  -     CBC with Auto Differential; Future  -     Ferritin; Future  -     Iron; Future    Hyperlipidemia, unspecified hyperlipidemia type  -     Lipid Panel; Future        No follow-ups on file. Medicare Annual Wellness Visit    Truong Cedillofe is here for Medicare AW    Assessment & Plan   Medicare annual wellness visit, subsequent  Essential hypertension  -     Comprehensive Metabolic Panel; Future  Anemia, unspecified type  -     CBC with Auto Differential; Future  -     Ferritin; Future  -     Iron; Future  Hyperlipidemia, unspecified hyperlipidemia type  -     Lipid Panel; Future      Recommendations for Preventive Services Due: see orders and patient instructions/AVS.  Recommended screening schedule for the next 5-10 years is provided to the patient in written form: see Patient Instructions/AVS.     No follow-ups on file. Subjective     Patient's complete Health Risk Assessment and screening values have been reviewed and are found in Flowsheets. The following problems were reviewed today and where indicated follow up appointments were made and/or referrals ordered.     Positive Risk Factor Screenings with Interventions:    Fall Risk:  Do you feel unsteady or are you worried about falling? : no  2 or more falls in past year?: (!) yes  Fall with injury in past year?: no     Interventions:    Patient declines any further evaluation

## 2023-04-07 NOTE — PATIENT INSTRUCTIONS
putting the weaker leg in first. Get out of a tub or shower with your strong side first.  Repair loose toilet seats and consider installing a raised toilet seat to make getting on and off the toilet easier. Keep your bathroom door unlocked while you are in the shower. Where can you learn more? Go to http://www.chapman.com/ and enter G117 to learn more about \"Preventing Falls: Care Instructions. \"  Current as of: November 9, 2022               Content Version: 13.6  © 5049-6789 Healthwise, [employer]. Care instructions adapted under license by [facility] ([facility]). If you have questions about a medical condition or this instruction, always ask your healthcare professional. [address] any warranty or liability for your use of this information. Learning About Being Active as an Older Adult  Why is being active important as you get older? Being active is one of the best things you can do for your health. And it's never too late to start. Being active--or getting active, if you aren't already--has definite benefits. It can:  Give you more energy,  Keep your mind sharp. Improve balance to reduce your risk of falls. Help you manage chronic illness with fewer medicines. No matter how old you are, how fit you are, or what health problems you have, there is a form of activity that will work for you. And the more physical activity you can do, the better your overall health will be. What kinds of activity can help you stay healthy? Being more active will make your daily activities easier. Physical activity includes planned exercise and things you do in daily life. There are four types of activity:  Aerobic. Doing aerobic activity makes your heart and lungs strong. Includes walking, dancing, and gardening. Aim for at least 2½ hours spread throughout the week. It improves your energy and can help you sleep better. Muscle-strengthening.   This type of activity can help

## 2023-04-07 NOTE — ACP (ADVANCE CARE PLANNING)
Advance Care Planning     Advance Care Planning (ACP) Physician/NP/PA Conversation    Date of Conversation: 4/7/2023  Conducted with: Patient with Decision Making Capacity    Healthcare Decision Maker:        Click here to complete [address] including selection of the Healthcare Decision Maker Relationship (ie \"Primary\")  Today we documented Decision Maker(s) consistent with Legal Next of Kin hierarchy. Care Preferences:    Hospitalization: \"If your health worsens and it becomes clear that your chance of recovery is unlikely, what would be your preference regarding hospitalization? \"  The patient would prefer comfort-focused treatment without hospitalization. Ventilation: \"If you were unable to breath on your own and your chance of recovery was unlikely, what would be your preference about the use of a ventilator (breathing machine) if it was available to you? \"  The patient would NOT desire the use of a ventilator. Resuscitation: \"In the event your heart stopped as a result of an underlying serious health condition, would you want attempts made to restart your heart, or would you prefer a natural death? \"  No, do NOT attempt to resuscitate.     treatment goals, benefit/burden of treatment options, artificial nutrition, ventilation preferences, hospitalization preferences, resuscitation preferences, end of life care preferences (vegetative state/imminent death), and hospice care    Conversation Outcomes / Follow-Up Plan:  ACP complete - no further action today  Reviewed DNR/DNI and patient elects Full Code (Attempt Resuscitation)    Length of Voluntary ACP Conversation in minutes:  16 minutes    Marla Barfield MD

## 2023-05-16 NOTE — PROGRESS NOTES
[facility] CARDIOLOGY  [address] [facility], [address]  PHONE: [phone]      23    NAME:  Milton Zamarripa  : 1935  MRN: [id number]         SUBJECTIVE:   Milton Zamarripa is a 81 y.o. male seen for a consultation visit regarding the following:     Chief Complaint   Patient presents with    Consultation    Hypertension            HPI:  Consultation is requested by Mando Machuca MD for evaluation of Consultation and Hypertension   . Consult for risk factors  Patient with lung cancer, recurrent pulmonary embolism with Eliquis failure now on lifelong Pradaxa per hematology, well controlled hypertension and dyslipidemia. Radiation therapy to the [facility], I see no baseline echo in either system    He's had fatigue and falling, fell yesterday after he got tangled up in some things in his office. Severe vertigo. His wife is a retired RN, they've seen BP occasionally drop to mid 90's. Eats poorly, wife says he could live on toast and cereal, he feels his weight is stable. Complains of mental fog, gait imbalance, tinnitus, vertigo (chronic), denies anginal quality discomfort or peripheral edema. PAST CARDIAC HISTORY:  [facility] lung cancer - radiation therapy-[facility]  2023       Recurrent pulmonary embolism with PNA- [facility]. Occurred on Eliquis, switched to pradaxa per hematology      Key CAD CHF Meds            dabigatran (PRADAXA) 150 MG capsule (Taking)    Sig - Route: Take 1 capsule by mouth 2 times daily - Oral    atorvastatin (LIPITOR) 20 MG tablet (Taking)    Class: Historical Med          Key Antihyperglycemic Medications       Patient is on no antihyperglycemic meds. Past Medical History, Past Surgical History, Family history, Social History, and Medications were all reviewed with the patient today and updated as necessary. Prior to Admission medications    Medication Sig Start Date End Date Taking?  Authorizing Provider

## 2023-05-17 ENCOUNTER — INITIAL CONSULT (OUTPATIENT)
Age: 88
End: 2023-05-17
Payer: MEDICARE

## 2023-05-17 VITALS
SYSTOLIC BLOOD PRESSURE: 106 MMHG | BODY MASS INDEX: 29.73 KG/M2 | DIASTOLIC BLOOD PRESSURE: 68 MMHG | WEIGHT: 185 LBS | HEIGHT: 66 IN | HEART RATE: 80 BPM

## 2023-05-17 DIAGNOSIS — I95.89 IATROGENIC HYPOTENSION: ICD-10-CM

## 2023-05-17 DIAGNOSIS — I10 ESSENTIAL HYPERTENSION: Primary | ICD-10-CM

## 2023-05-17 DIAGNOSIS — R06.02 SHORTNESS OF BREATH: ICD-10-CM

## 2023-05-17 DIAGNOSIS — E78.2 MIXED HYPERLIPIDEMIA: ICD-10-CM

## 2023-05-17 DIAGNOSIS — C34.12 MALIGNANT NEOPLASM OF UPPER LOBE OF LEFT LUNG (HCC): ICD-10-CM

## 2023-05-17 DIAGNOSIS — I27.82 OTHER CHRONIC PULMONARY EMBOLISM WITHOUT ACUTE COR PULMONALE (HCC): ICD-10-CM

## 2023-05-17 PROCEDURE — G8417 CALC BMI ABV UP PARAM F/U: HCPCS | Performed by: INTERNAL MEDICINE

## 2023-05-17 PROCEDURE — 1036F TOBACCO NON-USER: CPT | Performed by: INTERNAL MEDICINE

## 2023-05-17 PROCEDURE — 1123F ACP DISCUSS/DSCN MKR DOCD: CPT | Performed by: INTERNAL MEDICINE

## 2023-05-17 PROCEDURE — G8427 DOCREV CUR MEDS BY ELIG CLIN: HCPCS | Performed by: INTERNAL MEDICINE

## 2023-05-17 PROCEDURE — 99204 OFFICE O/P NEW MOD 45 MIN: CPT | Performed by: INTERNAL MEDICINE

## 2023-05-17 RX ORDER — OMEGA-3S/DHA/EPA/FISH OIL/D3 300MG-1000
400 CAPSULE ORAL
COMMUNITY

## 2023-05-17 RX ORDER — OMEPRAZOLE 20 MG/1
20 CAPSULE, DELAYED RELEASE ORAL DAILY
COMMUNITY

## 2023-05-17 RX ORDER — METHOCARBAMOL 750 MG/1
750 TABLET, FILM COATED ORAL 4 TIMES DAILY
COMMUNITY

## 2023-05-17 ASSESSMENT — ENCOUNTER SYMPTOMS: SHORTNESS OF BREATH: 1

## 2023-05-17 NOTE — PATIENT INSTRUCTIONS
Patient Education        Learning About the Mediterranean Diet  What is the [address] [facility]? The Mediterranean diet is a style of eating rather than a diet plan. It features foods eaten in [place], [place], [place] and [place], and other countries along the [facility]. It emphasizes eating foods like fish, fruits, vegetables, beans, high-fiber breads and whole grains, nuts, and olive oil. This style of eating includes limited red meat, cheese, and sweets. Why choose the Mediterranean diet? A Mediterranean-style diet may improve heart health. It contains more fat than other heart-healthy diets. But the fats are mainly from nuts, unsaturated oils (such as fish oils and olive oil), and certain nut or seed oils (such as canola, soybean, or flaxseed oil). These fats may help protect the heart and blood vessels. How can you get started on the Mediterranean diet? Here are some things you can do to switch to a more Mediterranean way of eating. What to eat  Eat a variety of fruits and vegetables each day, such as grapes, blueberries, tomatoes, broccoli, peppers, figs, olives, spinach, eggplant, beans, lentils, and chickpeas. Eat a variety of whole-grain foods each day, such as oats, brown rice, and whole wheat bread, pasta, and couscous. Eat fish at least 2 times a week. Try tuna, salmon, mackerel, lake trout, herring, or sardines. Eat moderate amounts of low-fat dairy products, such as milk, cheese, or yogurt. Eat moderate amounts of poultry and eggs. Choose healthy (unsaturated) fats, such as nuts, olive oil, and certain nut or seed oils like canola, soybean, and flaxseed. Limit unhealthy (saturated) fats, such as butter, palm oil, and coconut oil. And limit fats found in animal products, such as meat and dairy products made with whole milk. Try to eat red meat only a few times a month in very small amounts. Limit sweets and desserts to only a few times a week.  This includes sugar-sweetened

## 2023-06-20 ENCOUNTER — OFFICE VISIT (OUTPATIENT)
Dept: INTERNAL MEDICINE CLINIC | Facility: CLINIC | Age: 88
End: 2023-06-20
Payer: MEDICARE

## 2023-06-20 VITALS
BODY MASS INDEX: 29.7 KG/M2 | DIASTOLIC BLOOD PRESSURE: 76 MMHG | HEART RATE: 89 BPM | SYSTOLIC BLOOD PRESSURE: 120 MMHG | WEIGHT: 184 LBS | OXYGEN SATURATION: 96 %

## 2023-06-20 DIAGNOSIS — R39.89 SUSPECTED UTI: ICD-10-CM

## 2023-06-20 DIAGNOSIS — I10 ESSENTIAL HYPERTENSION: Primary | ICD-10-CM

## 2023-06-20 DIAGNOSIS — N39.43 BENIGN PROSTATIC HYPERPLASIA WITH POST-VOID DRIBBLING: ICD-10-CM

## 2023-06-20 DIAGNOSIS — N40.1 BENIGN PROSTATIC HYPERPLASIA WITH POST-VOID DRIBBLING: ICD-10-CM

## 2023-06-20 PROBLEM — N41.9 PROSTATITIS: Status: ACTIVE | Noted: 2020-05-11

## 2023-06-20 PROBLEM — M79.89 SOFT TISSUE MASS: Status: ACTIVE | Noted: 2020-08-28

## 2023-06-20 PROBLEM — N30.01 ACUTE CYSTITIS WITH HEMATURIA: Status: ACTIVE | Noted: 2020-05-11

## 2023-06-20 PROBLEM — D61.818 PANCYTOPENIA (HCC): Status: ACTIVE | Noted: 2021-11-04

## 2023-06-20 PROBLEM — R91.8 LUNG MASS: Status: ACTIVE | Noted: 2022-03-08

## 2023-06-20 PROBLEM — R42 VERTIGO: Status: ACTIVE | Noted: 2020-05-11

## 2023-06-20 PROBLEM — R11.2 INTRACTABLE VOMITING WITH NAUSEA: Status: ACTIVE | Noted: 2020-05-11

## 2023-06-20 PROBLEM — R19.7 DIARRHEA: Status: ACTIVE | Noted: 2021-11-03

## 2023-06-20 PROBLEM — N41.0 ACUTE PROSTATITIS: Status: ACTIVE | Noted: 2020-05-12

## 2023-06-20 PROBLEM — U07.1 PNEUMONIA DUE TO COVID-19 VIRUS: Status: ACTIVE | Noted: 2021-11-03

## 2023-06-20 PROBLEM — J12.82 PNEUMONIA DUE TO COVID-19 VIRUS: Status: ACTIVE | Noted: 2021-11-03

## 2023-06-20 PROCEDURE — 1123F ACP DISCUSS/DSCN MKR DOCD: CPT | Performed by: INTERNAL MEDICINE

## 2023-06-20 PROCEDURE — G8427 DOCREV CUR MEDS BY ELIG CLIN: HCPCS | Performed by: INTERNAL MEDICINE

## 2023-06-20 PROCEDURE — 99214 OFFICE O/P EST MOD 30 MIN: CPT | Performed by: INTERNAL MEDICINE

## 2023-06-20 PROCEDURE — 1036F TOBACCO NON-USER: CPT | Performed by: INTERNAL MEDICINE

## 2023-06-20 PROCEDURE — G8417 CALC BMI ABV UP PARAM F/U: HCPCS | Performed by: INTERNAL MEDICINE

## 2023-06-20 RX ORDER — FINASTERIDE 5 MG/1
5 TABLET, FILM COATED ORAL DAILY
Qty: 90 TABLET | Refills: 3 | Status: SHIPPED | OUTPATIENT
Start: 2023-06-20

## 2023-06-20 ASSESSMENT — ENCOUNTER SYMPTOMS
ABDOMINAL PAIN: 0
COUGH: 0
SHORTNESS OF BREATH: 0

## 2023-06-20 ASSESSMENT — PATIENT HEALTH QUESTIONNAIRE - PHQ9
2. FEELING DOWN, DEPRESSED OR HOPELESS: 0
SUM OF ALL RESPONSES TO PHQ QUESTIONS 1-9: 0
SUM OF ALL RESPONSES TO PHQ QUESTIONS 1-9: 0
SUM OF ALL RESPONSES TO PHQ9 QUESTIONS 1 & 2: 0
SUM OF ALL RESPONSES TO PHQ QUESTIONS 1-9: 0
SUM OF ALL RESPONSES TO PHQ QUESTIONS 1-9: 0
1. LITTLE INTEREST OR PLEASURE IN DOING THINGS: 0

## 2023-06-20 NOTE — PROGRESS NOTES
SUBJECTIVE:   Keysha Das is a 81 y.o. male seen for a visit regarding   Chief Complaint   Patient presents with    Hypertension    Hyperlipidemia        HPI  [facility]      Lung Cancer s/p radiation - saw Jessi Vazquez, on CT scans follow up  Pulmonary Embolism, Former smoker - quit 48 years ago, sees Pulmonary Dr. Sheri Zelaya, on Pradaxa  History of Gout - on Allopurinol 100mg(stopped 6/20/23) will consider restarting as needed; last attacks 10 years ago  Mild Anemia on labs - no bleeding, H/o Gastric ulcer  Postvoid dribbling - on trial of Finasteride  Hyperlipidemia - on Atorvastatin(stopped 6/20/2023) due to fatigue symptoms  HTN - no headache  Chronic Dizziness, Tinnitus - advised to only use Methocarbomal and Meclizine as needed only    Past Medical History, Past Surgical History, Family history, Social History, and Medications were all reviewed with the patient today and updated as necessary. Current Outpatient Medications   Medication Sig Dispense Refill    finasteride (PROSCAR) 5 MG tablet Take 1 tablet by mouth daily 90 tablet 3    vitamin D3 (CHOLECALCIFEROL) 10 MCG (400 UNIT) TABS tablet 1 tablet      dabigatran (PRADAXA) 150 MG capsule Take 1 capsule by mouth 2 times daily 60 capsule 0    aspirin 81 MG chewable tablet Take 1 tablet by mouth daily       No current facility-administered medications for this visit. Allergies   Allergen Reactions    Amlodipine Other (See Comments)     Other reaction(s):  Other- (not listed) - Allergy  Shot bp up      Atorvastatin Other (See Comments)    Bupropion Other (See Comments)    Clonidine Other (See Comments)    Doxazosin Other (See Comments)    Hydrochlorothiazide Other (See Comments)    Lisinopril Other (See Comments)    Metoprolol Other (See Comments)    Simvastatin Other (See Comments)     Patient Active Problem List   Diagnosis    DDD (degenerative disc disease), cervical    Neck pain    Pulmonary embolism without

## 2023-06-21 LAB
APPEARANCE UR: CLEAR
BACTERIA URNS QL MICRO: NEGATIVE /HPF
BILIRUB UR QL: NEGATIVE
CASTS URNS QL MICRO: NORMAL /LPF (ref 0–2)
COLOR UR: NORMAL
EPI CELLS #/AREA URNS HPF: NORMAL /HPF (ref 0–5)
GLUCOSE UR STRIP.AUTO-MCNC: NEGATIVE MG/DL
HGB UR QL STRIP: NEGATIVE
KETONES UR QL STRIP.AUTO: NEGATIVE MG/DL
LEUKOCYTE ESTERASE UR QL STRIP.AUTO: NEGATIVE
MUCOUS THREADS URNS QL MICRO: 0 /LPF
NITRITE UR QL STRIP.AUTO: NEGATIVE
PH UR STRIP: 5.5 (ref 5–9)
PROT UR STRIP-MCNC: NEGATIVE MG/DL
RBC #/AREA URNS HPF: NORMAL /HPF (ref 0–5)
SP GR UR REFRACTOMETRY: 1.01 (ref 1–1.02)
URINE CULTURE IF INDICATED: NORMAL
UROBILINOGEN UR QL STRIP.AUTO: 0.2 EU/DL (ref 0.2–1)
WBC URNS QL MICRO: NORMAL /HPF (ref 0–4)

## 2023-06-23 LAB
BACTERIA SPEC CULT: NORMAL
SERVICE CMNT-IMP: NORMAL

## 2023-06-30 ENCOUNTER — OFFICE VISIT (OUTPATIENT)
Age: 88
End: 2023-06-30
Payer: MEDICARE

## 2023-06-30 VITALS
SYSTOLIC BLOOD PRESSURE: 120 MMHG | BODY MASS INDEX: 29.67 KG/M2 | HEART RATE: 87 BPM | HEIGHT: 66 IN | DIASTOLIC BLOOD PRESSURE: 80 MMHG | WEIGHT: 184.6 LBS

## 2023-06-30 DIAGNOSIS — C34.12 MALIGNANT NEOPLASM OF UPPER LOBE OF LEFT LUNG (HCC): ICD-10-CM

## 2023-06-30 DIAGNOSIS — I27.82 OTHER CHRONIC PULMONARY EMBOLISM WITHOUT ACUTE COR PULMONALE (HCC): ICD-10-CM

## 2023-06-30 DIAGNOSIS — I95.89 IATROGENIC HYPOTENSION: Primary | ICD-10-CM

## 2023-06-30 PROCEDURE — 1123F ACP DISCUSS/DSCN MKR DOCD: CPT | Performed by: INTERNAL MEDICINE

## 2023-06-30 PROCEDURE — G8427 DOCREV CUR MEDS BY ELIG CLIN: HCPCS | Performed by: INTERNAL MEDICINE

## 2023-06-30 PROCEDURE — G8417 CALC BMI ABV UP PARAM F/U: HCPCS | Performed by: INTERNAL MEDICINE

## 2023-06-30 PROCEDURE — 99214 OFFICE O/P EST MOD 30 MIN: CPT | Performed by: INTERNAL MEDICINE

## 2023-06-30 PROCEDURE — 1036F TOBACCO NON-USER: CPT | Performed by: INTERNAL MEDICINE

## 2023-06-30 ASSESSMENT — ENCOUNTER SYMPTOMS: SHORTNESS OF BREATH: 0

## 2023-07-02 ENCOUNTER — APPOINTMENT (OUTPATIENT)
Dept: CT IMAGING | Age: 88
End: 2023-07-02
Payer: OTHER GOVERNMENT

## 2023-07-02 ENCOUNTER — HOSPITAL ENCOUNTER (EMERGENCY)
Age: 88
Discharge: HOME | End: 2023-07-03
Attending: EMERGENCY MEDICINE
Payer: OTHER GOVERNMENT

## 2023-07-02 DIAGNOSIS — R51.9 LEFT FACIAL PRESSURE AND PAIN: Primary | ICD-10-CM

## 2023-07-02 DIAGNOSIS — K11.20 SIALOADENITIS: ICD-10-CM

## 2023-07-02 LAB
ALBUMIN SERPL-MCNC: 3.5 G/DL (ref 3.2–4.6)
ALBUMIN/GLOB SERPL: 0.9 (ref 0.4–1.6)
ALP SERPL-CCNC: 91 U/L (ref 50–136)
ALT SERPL-CCNC: 27 U/L (ref 12–65)
ANION GAP SERPL CALC-SCNC: 8 MMOL/L (ref 2–11)
AST SERPL-CCNC: 24 U/L (ref 15–37)
BILIRUB SERPL-MCNC: 0.5 MG/DL (ref 0.2–1.1)
BUN SERPL-MCNC: 24 MG/DL (ref 8–23)
CALCIUM SERPL-MCNC: 9.7 MG/DL (ref 8.3–10.4)
CHLORIDE SERPL-SCNC: 105 MMOL/L (ref 101–110)
CO2 SERPL-SCNC: 28 MMOL/L (ref 21–32)
CREAT SERPL-MCNC: 1.15 MG/DL (ref 0.8–1.5)
ERYTHROCYTE [DISTWIDTH] IN BLOOD BY AUTOMATED COUNT: 13.2 % (ref 11.9–14.6)
GLOBULIN SER CALC-MCNC: 4.1 G/DL (ref 2.8–4.5)
GLUCOSE SERPL-MCNC: 99 MG/DL (ref 65–100)
HCT VFR BLD AUTO: 42.1 % (ref 41.1–50.3)
HGB BLD-MCNC: 13.5 G/DL (ref 13.6–17.2)
MCH RBC QN AUTO: 32 PG (ref 26.1–32.9)
MCHC RBC AUTO-ENTMCNC: 32.1 G/DL (ref 31.4–35)
MCV RBC AUTO: 99.8 FL (ref 82–102)
NRBC # BLD: 0 K/UL (ref 0–0.2)
PLATELET # BLD AUTO: 196 K/UL (ref 150–450)
PMV BLD AUTO: 9 FL (ref 9.4–12.3)
POTASSIUM SERPL-SCNC: 4.5 MMOL/L (ref 3.5–5.1)
PROT SERPL-MCNC: 7.6 G/DL (ref 6.3–8.2)
RBC # BLD AUTO: 4.22 M/UL (ref 4.23–5.6)
SODIUM SERPL-SCNC: 141 MMOL/L (ref 133–143)
WBC # BLD AUTO: 8.3 K/UL (ref 4.3–11.1)

## 2023-07-02 PROCEDURE — 99285 EMERGENCY DEPT VISIT HI MDM: CPT

## 2023-07-02 PROCEDURE — 85027 COMPLETE CBC AUTOMATED: CPT

## 2023-07-02 PROCEDURE — 6360000004 HC RX CONTRAST MEDICATION: Performed by: EMERGENCY MEDICINE

## 2023-07-02 PROCEDURE — 80053 COMPREHEN METABOLIC PANEL: CPT

## 2023-07-02 PROCEDURE — 2580000003 HC RX 258: Performed by: EMERGENCY MEDICINE

## 2023-07-02 PROCEDURE — 70487 CT MAXILLOFACIAL W/DYE: CPT

## 2023-07-02 RX ORDER — 0.9 % SODIUM CHLORIDE 0.9 %
100 INTRAVENOUS SOLUTION INTRAVENOUS
Status: COMPLETED | OUTPATIENT
Start: 2023-07-02 | End: 2023-07-03

## 2023-07-02 RX ORDER — SODIUM CHLORIDE 0.9 % (FLUSH) 0.9 %
10 SYRINGE (ML) INJECTION
Status: COMPLETED | OUTPATIENT
Start: 2023-07-02 | End: 2023-07-02

## 2023-07-02 RX ADMIN — IOPAMIDOL 100 ML: 755 INJECTION, SOLUTION INTRAVENOUS at 23:22

## 2023-07-02 RX ADMIN — SODIUM CHLORIDE 100 ML: 9 INJECTION, SOLUTION INTRAVENOUS at 23:22

## 2023-07-02 RX ADMIN — SODIUM CHLORIDE, PRESERVATIVE FREE 10 ML: 5 INJECTION INTRAVENOUS at 23:22

## 2023-07-02 ASSESSMENT — ENCOUNTER SYMPTOMS
FACIAL SWELLING: 1
SHORTNESS OF BREATH: 0
DIARRHEA: 0
VOMITING: 0
COUGH: 0
CHEST TIGHTNESS: 0
NAUSEA: 0
EYE DISCHARGE: 0
EYE ITCHING: 0

## 2023-07-02 ASSESSMENT — PAIN SCALES - GENERAL
PAINLEVEL_OUTOF10: 7
PAINLEVEL_OUTOF10: 10

## 2023-07-02 ASSESSMENT — PAIN - FUNCTIONAL ASSESSMENT
PAIN_FUNCTIONAL_ASSESSMENT: 0-10
PAIN_FUNCTIONAL_ASSESSMENT: 0-10

## 2023-07-03 VITALS
BODY MASS INDEX: 29.57 KG/M2 | DIASTOLIC BLOOD PRESSURE: 76 MMHG | HEART RATE: 90 BPM | HEIGHT: 66 IN | WEIGHT: 184 LBS | RESPIRATION RATE: 18 BRPM | OXYGEN SATURATION: 100 % | SYSTOLIC BLOOD PRESSURE: 140 MMHG | TEMPERATURE: 97.2 F

## 2023-07-03 PROCEDURE — 6370000000 HC RX 637 (ALT 250 FOR IP): Performed by: EMERGENCY MEDICINE

## 2023-07-03 RX ORDER — OXYCODONE HYDROCHLORIDE 5 MG/1
5 TABLET ORAL EVERY 4 HOURS PRN
Qty: 19 TABLET | Refills: 0 | Status: SHIPPED | OUTPATIENT
Start: 2023-07-03 | End: 2023-07-06

## 2023-07-03 RX ORDER — OXYCODONE HYDROCHLORIDE 5 MG/1
10 TABLET ORAL
Status: COMPLETED | OUTPATIENT
Start: 2023-07-03 | End: 2023-07-03

## 2023-07-03 RX ADMIN — OXYCODONE HYDROCHLORIDE 10 MG: 5 TABLET ORAL at 00:53

## 2023-07-03 ASSESSMENT — PAIN SCALES - GENERAL: PAINLEVEL_OUTOF10: 7

## 2023-07-03 NOTE — ED NOTES
This nurse c/c with Amanda and Dr Crystal Pereira related to patients complaints, verbal orders read back for cbc and cmet / Garrel Guardian to place in room when one is open     Shameka List, RN  07/02/23 [id number] [facility], RN  07/02/23 7641

## 2023-07-03 NOTE — ED NOTES
Spoke with  related to patient wanting to be seen faster this nurse reviewed chart, spoke to triage asked Amanda to reassess and updated a provider      Renetta Novak RN  07/02/23 3604

## 2023-07-03 NOTE — DISCHARGE INSTRUCTIONS
Complete clindamycin as prescribed  Use pain medication as needed  Alternate Motrin and Tylenol as needed  Recheck with your doctor soon as possible    Return to ER for any worsening symptoms or new problems which may arise

## 2023-07-03 NOTE — ED PROVIDER NOTES
Emergency Department Provider Note       PCP: Ileana Patterson MD   Age: 81 y.o. Sex: male     DISPOSITION Decision To Discharge 07/03/2023 12:25:36 AM       ICD-10-CM    1. Left facial pressure and pain  R51.9 oxyCODONE (ROXICODONE) 5 MG immediate release tablet      2. Sialoadenitis  K11.20 oxyCODONE (ROXICODONE) 5 MG immediate release tablet          Medical Decision Making     Complexity of Problems Addressed:  1 or more chronic illnesses with a severe exacerbation or progression. Data Reviewed and Analyzed:  Category 1:   I independently ordered and reviewed each unique test.  I reviewed external records: ED visit note from an outside group. I reviewed external records: provider visit note from PCP. I reviewed external records: provider visit note from outside specialist.   The patients assessment required an independent historian: Spouse at bedside. The reason they were needed is important historical information not provided by the patient. Category 2:   I interpreted the CT Scan maxillofacial CT negative for abscess. Inflammatory changes along the left mandible concerning for possible infection. Also inflammatory changes in the left maxillary salivary gland raise question of possible sial adenitis no evidence of sial O lithiasis category 3: Discussion of management or test interpretation.   81-year-old male with pain and swelling in his left maxillary region  Was seen several days ago and started on clindamycin for possible dental infection  Afebrile here lab work unremarkable  CT performed which revealed some inflammatory changes in the tissue around the jaw as well as the salivary gland  Unclear whether this is more reflecting a dental infection or possible inflammatory changes in the salivary gland itself  We will continue clindamycin  We will start some pain medication  Advise close follow-up with his primary care provider and dentist as well  Also advised lemon drops to stimulate

## 2023-07-03 NOTE — ED TRIAGE NOTES
Pt c/o swelling in left jaw x 3 days, was seen by DWAIN who dx swollen salivary gland and started clindamycin, currently reports no relief.

## 2023-08-02 ENCOUNTER — OFFICE VISIT (OUTPATIENT)
Dept: INTERNAL MEDICINE CLINIC | Facility: CLINIC | Age: 88
End: 2023-08-02
Payer: MEDICARE

## 2023-08-02 VITALS
OXYGEN SATURATION: 93 % | RESPIRATION RATE: 16 BRPM | BODY MASS INDEX: 29.25 KG/M2 | HEART RATE: 81 BPM | WEIGHT: 182 LBS | HEIGHT: 66 IN | TEMPERATURE: 97.1 F | DIASTOLIC BLOOD PRESSURE: 89 MMHG | SYSTOLIC BLOOD PRESSURE: 148 MMHG

## 2023-08-02 DIAGNOSIS — M25.562 CHRONIC PAIN OF LEFT KNEE: Primary | ICD-10-CM

## 2023-08-02 DIAGNOSIS — G89.29 CHRONIC PAIN OF LEFT KNEE: Primary | ICD-10-CM

## 2023-08-02 PROCEDURE — 1123F ACP DISCUSS/DSCN MKR DOCD: CPT | Performed by: INTERNAL MEDICINE

## 2023-08-02 PROCEDURE — G8417 CALC BMI ABV UP PARAM F/U: HCPCS | Performed by: INTERNAL MEDICINE

## 2023-08-02 PROCEDURE — 1036F TOBACCO NON-USER: CPT | Performed by: INTERNAL MEDICINE

## 2023-08-02 PROCEDURE — 99213 OFFICE O/P EST LOW 20 MIN: CPT | Performed by: INTERNAL MEDICINE

## 2023-08-02 PROCEDURE — G8427 DOCREV CUR MEDS BY ELIG CLIN: HCPCS | Performed by: INTERNAL MEDICINE

## 2023-08-02 RX ORDER — TRAMADOL HYDROCHLORIDE 50 MG/1
50 TABLET ORAL DAILY PRN
Qty: 30 TABLET | Refills: 0 | Status: SHIPPED | OUTPATIENT
Start: 2023-08-02 | End: 2023-09-01

## 2023-08-02 ASSESSMENT — ENCOUNTER SYMPTOMS
ABDOMINAL PAIN: 0
COUGH: 0
SHORTNESS OF BREATH: 0

## 2023-08-02 NOTE — PROGRESS NOTES
SUBJECTIVE:   Anette Andrea is a 81 y.o. male seen for a visit regarding   Chief Complaint   Patient presents with    Knee Pain     Left knee-On set- a few years ago        HPI  Sees [place], here with wife      Left knee osteoarthritis - seeing Anjelpeng Deana; tried Tylenol/voltaren;  with minimal help; has tried tramadol and oxycodone in the past     Past Medical History, Past Surgical History, Family history, Social History, and Medications were all reviewed with the patient today and updated as necessary. Current Outpatient Medications   Medication Sig Dispense Refill    traMADol (ULTRAM) 50 MG tablet Take 1 tablet by mouth daily as needed for Pain for up to 30 days. Max Daily Amount: 50 mg 30 tablet 0    finasteride (PROSCAR) 5 MG tablet Take 1 tablet by mouth daily 90 tablet 3    vitamin D3 (CHOLECALCIFEROL) 10 MCG (400 UNIT) TABS tablet 1 tablet      dabigatran (PRADAXA) 150 MG capsule Take 1 capsule by mouth 2 times daily 60 capsule 0    aspirin 81 MG chewable tablet Take 1 tablet by mouth daily       No current facility-administered medications for this visit. Allergies   Allergen Reactions    Amlodipine Other (See Comments)     Other reaction(s):  Other- (not listed) - Allergy  Shot bp up      Atorvastatin Other (See Comments)    Bupropion Other (See Comments)    Clonidine Other (See Comments)    Doxazosin Other (See Comments)    Hydrochlorothiazide Other (See Comments)    Lisinopril Other (See Comments)    Metoprolol Other (See Comments)    Simvastatin Other (See Comments)     Patient Active Problem List   Diagnosis    DDD (degenerative disc disease), cervical    Neck pain    Pulmonary embolism without acute cor pulmonale (HCC)    Essential hypertension    GERD (gastroesophageal reflux disease)    Malignant 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 (HCC)    Nontraumatic tear of left rotator cuff    Acute hypoxemic respiratory failure (HCC)    HLD (hyperlipidemia)    PNA (pneumonia)    Acute

## 2023-08-13 ENCOUNTER — APPOINTMENT (OUTPATIENT)
Dept: CT IMAGING | Age: 88
End: 2023-08-13
Payer: MEDICARE

## 2023-08-13 ENCOUNTER — HOSPITAL ENCOUNTER (OUTPATIENT)
Age: 88
Setting detail: OBSERVATION
Discharge: HOME | End: 2023-08-14
Attending: EMERGENCY MEDICINE | Admitting: STUDENT IN AN ORGANIZED HEALTH CARE EDUCATION/TRAINING PROGRAM
Payer: MEDICARE

## 2023-08-13 DIAGNOSIS — R29.90 STROKE-LIKE SYMPTOMS: Primary | ICD-10-CM

## 2023-08-13 DIAGNOSIS — G45.9 TIA (TRANSIENT ISCHEMIC ATTACK): ICD-10-CM

## 2023-08-13 PROBLEM — Z86.711 HISTORY OF PULMONARY EMBOLISM: Status: ACTIVE | Noted: 2023-08-13

## 2023-08-13 LAB
ALBUMIN SERPL-MCNC: 3.5 G/DL (ref 3.2–4.6)
ALBUMIN/GLOB SERPL: 0.8 (ref 0.4–1.6)
ALP SERPL-CCNC: 80 U/L (ref 50–136)
ALT SERPL-CCNC: 23 U/L (ref 12–65)
ANION GAP SERPL CALC-SCNC: 2 MMOL/L (ref 2–11)
AST SERPL-CCNC: 32 U/L (ref 15–37)
BASOPHILS # BLD: 0.1 K/UL (ref 0–0.2)
BASOPHILS NFR BLD: 1 % (ref 0–2)
BILIRUB SERPL-MCNC: 0.6 MG/DL (ref 0.2–1.1)
BUN SERPL-MCNC: 24 MG/DL (ref 8–23)
CALCIUM SERPL-MCNC: 8.9 MG/DL (ref 8.3–10.4)
CHLORIDE SERPL-SCNC: 110 MMOL/L (ref 101–110)
CHOLEST SERPL-MCNC: 221 MG/DL
CO2 SERPL-SCNC: 28 MMOL/L (ref 21–32)
CREAT SERPL-MCNC: 1.08 MG/DL (ref 0.8–1.5)
DIFFERENTIAL METHOD BLD: NORMAL
EOSINOPHIL # BLD: 0.5 K/UL (ref 0–0.8)
EOSINOPHIL NFR BLD: 6 % (ref 0.5–7.8)
ERYTHROCYTE [DISTWIDTH] IN BLOOD BY AUTOMATED COUNT: 13.3 % (ref 11.9–14.6)
EST. AVERAGE GLUCOSE BLD GHB EST-MCNC: 128 MG/DL
GLOBULIN SER CALC-MCNC: 4.2 G/DL (ref 2.8–4.5)
GLUCOSE BLD STRIP.AUTO-MCNC: 100 MG/DL (ref 65–100)
GLUCOSE SERPL-MCNC: 87 MG/DL (ref 65–100)
HBA1C MFR BLD: 6.1 % (ref 4.8–5.6)
HCT VFR BLD AUTO: 43 % (ref 41.1–50.3)
HDLC SERPL-MCNC: 46 MG/DL (ref 40–60)
HDLC SERPL: 4.8
HGB BLD-MCNC: 13.8 G/DL (ref 13.6–17.2)
IMM GRANULOCYTES # BLD AUTO: 0 K/UL (ref 0–0.5)
IMM GRANULOCYTES NFR BLD AUTO: 1 % (ref 0–5)
INR BLD: 1.4 (ref 0.9–1.2)
INR PPP: 1.3
LDLC SERPL CALC-MCNC: 140.8 MG/DL
LYMPHOCYTES # BLD: 1.6 K/UL (ref 0.5–4.6)
LYMPHOCYTES NFR BLD: 21 % (ref 13–44)
MCH RBC QN AUTO: 32.4 PG (ref 26.1–32.9)
MCHC RBC AUTO-ENTMCNC: 32.1 G/DL (ref 31.4–35)
MCV RBC AUTO: 100.9 FL (ref 82–102)
MONOCYTES # BLD: 0.7 K/UL (ref 0.1–1.3)
MONOCYTES NFR BLD: 10 % (ref 4–12)
NEUTS SEG # BLD: 4.6 K/UL (ref 1.7–8.2)
NEUTS SEG NFR BLD: 62 % (ref 43–78)
NRBC # BLD: 0 K/UL (ref 0–0.2)
PLATELET # BLD AUTO: 200 K/UL (ref 150–450)
PMV BLD AUTO: 9.6 FL (ref 9.4–12.3)
POTASSIUM SERPL-SCNC: 4.8 MMOL/L (ref 3.5–5.1)
PROT SERPL-MCNC: 7.7 G/DL (ref 6.3–8.2)
PROTHROMBIN TIME: 16.5 SEC (ref 12.6–14.3)
PT BLD: 17 SECS (ref 9.6–11.6)
RBC # BLD AUTO: 4.26 M/UL (ref 4.23–5.6)
SERVICE CMNT-IMP: NORMAL
SODIUM SERPL-SCNC: 140 MMOL/L (ref 133–143)
TRIGL SERPL-MCNC: 171 MG/DL (ref 35–150)
TROPONIN I SERPL HS-MCNC: 7.2 PG/ML (ref 0–14)
TSH W FREE THYROID IF ABNORMAL: 2.09 UIU/ML (ref 0.36–3.74)
VLDLC SERPL CALC-MCNC: 34.2 MG/DL (ref 6–23)
WBC # BLD AUTO: 7.5 K/UL (ref 4.3–11.1)

## 2023-08-13 PROCEDURE — 70450 CT HEAD/BRAIN W/O DYE: CPT

## 2023-08-13 PROCEDURE — 85025 COMPLETE CBC W/AUTO DIFF WBC: CPT

## 2023-08-13 PROCEDURE — 80053 COMPREHEN METABOLIC PANEL: CPT

## 2023-08-13 PROCEDURE — 80061 LIPID PANEL: CPT

## 2023-08-13 PROCEDURE — 82962 GLUCOSE BLOOD TEST: CPT

## 2023-08-13 PROCEDURE — 70498 CT ANGIOGRAPHY NECK: CPT

## 2023-08-13 PROCEDURE — 2580000003 HC RX 258: Performed by: NURSE PRACTITIONER

## 2023-08-13 PROCEDURE — 2580000003 HC RX 258: Performed by: PHYSICIAN ASSISTANT

## 2023-08-13 PROCEDURE — G0378 HOSPITAL OBSERVATION PER HR: HCPCS

## 2023-08-13 PROCEDURE — 83036 HEMOGLOBIN GLYCOSYLATED A1C: CPT

## 2023-08-13 PROCEDURE — 99285 EMERGENCY DEPT VISIT HI MDM: CPT

## 2023-08-13 PROCEDURE — 6360000004 HC RX CONTRAST MEDICATION: Performed by: NURSE PRACTITIONER

## 2023-08-13 PROCEDURE — 84484 ASSAY OF TROPONIN QUANT: CPT

## 2023-08-13 PROCEDURE — 6370000000 HC RX 637 (ALT 250 FOR IP): Performed by: PHYSICIAN ASSISTANT

## 2023-08-13 PROCEDURE — 85610 PROTHROMBIN TIME: CPT

## 2023-08-13 PROCEDURE — 84443 ASSAY THYROID STIM HORMONE: CPT

## 2023-08-13 RX ORDER — FAMOTIDINE 20 MG/1
20 TABLET, FILM COATED ORAL 2 TIMES DAILY
COMMUNITY

## 2023-08-13 RX ORDER — MAGNESIUM SULFATE IN WATER 40 MG/ML
2000 INJECTION, SOLUTION INTRAVENOUS PRN
Status: DISCONTINUED | OUTPATIENT
Start: 2023-08-13 | End: 2023-08-14 | Stop reason: HOSPADM

## 2023-08-13 RX ORDER — POTASSIUM CHLORIDE 7.45 MG/ML
10 INJECTION INTRAVENOUS PRN
Status: DISCONTINUED | OUTPATIENT
Start: 2023-08-13 | End: 2023-08-14 | Stop reason: HOSPADM

## 2023-08-13 RX ORDER — LORAZEPAM 2 MG/ML
1 INJECTION INTRAMUSCULAR
Status: DISCONTINUED | OUTPATIENT
Start: 2023-08-13 | End: 2023-08-14 | Stop reason: HOSPADM

## 2023-08-13 RX ORDER — ALLOPURINOL 100 MG/1
100 TABLET ORAL DAILY
COMMUNITY

## 2023-08-13 RX ORDER — TRAMADOL HYDROCHLORIDE 50 MG/1
50 TABLET ORAL EVERY 6 HOURS PRN
Status: DISCONTINUED | OUTPATIENT
Start: 2023-08-13 | End: 2023-08-14 | Stop reason: HOSPADM

## 2023-08-13 RX ORDER — DABIGATRAN ETEXILATE 75 MG/1
150 CAPSULE ORAL 2 TIMES DAILY
Status: DISCONTINUED | OUTPATIENT
Start: 2023-08-13 | End: 2023-08-14 | Stop reason: HOSPADM

## 2023-08-13 RX ORDER — MECLIZINE HYDROCHLORIDE 25 MG/1
25 TABLET ORAL 3 TIMES DAILY PRN
COMMUNITY

## 2023-08-13 RX ORDER — FINASTERIDE 5 MG/1
5 TABLET, FILM COATED ORAL DAILY
Status: DISCONTINUED | OUTPATIENT
Start: 2023-08-14 | End: 2023-08-14 | Stop reason: HOSPADM

## 2023-08-13 RX ORDER — CETIRIZINE HYDROCHLORIDE 10 MG/1
10 TABLET ORAL DAILY
COMMUNITY

## 2023-08-13 RX ORDER — ACETAMINOPHEN 650 MG/1
650 SUPPOSITORY RECTAL EVERY 4 HOURS PRN
Status: DISCONTINUED | OUTPATIENT
Start: 2023-08-13 | End: 2023-08-14 | Stop reason: HOSPADM

## 2023-08-13 RX ORDER — SODIUM CHLORIDE 0.9 % (FLUSH) 0.9 %
5-40 SYRINGE (ML) INJECTION PRN
Status: DISCONTINUED | OUTPATIENT
Start: 2023-08-13 | End: 2023-08-14 | Stop reason: HOSPADM

## 2023-08-13 RX ORDER — MECLIZINE HCL 12.5 MG/1
25 TABLET ORAL EVERY 6 HOURS PRN
Status: DISCONTINUED | OUTPATIENT
Start: 2023-08-13 | End: 2023-08-14 | Stop reason: HOSPADM

## 2023-08-13 RX ORDER — ACETAMINOPHEN 325 MG/1
650 TABLET ORAL EVERY 4 HOURS PRN
Status: DISCONTINUED | OUTPATIENT
Start: 2023-08-13 | End: 2023-08-14 | Stop reason: HOSPADM

## 2023-08-13 RX ORDER — DIPHENHYDRAMINE HYDROCHLORIDE 50 MG/ML
50 INJECTION INTRAMUSCULAR; INTRAVENOUS EVERY 6 HOURS PRN
Status: DISCONTINUED | OUTPATIENT
Start: 2023-08-13 | End: 2023-08-14 | Stop reason: HOSPADM

## 2023-08-13 RX ORDER — ONDANSETRON 2 MG/ML
4 INJECTION INTRAMUSCULAR; INTRAVENOUS EVERY 6 HOURS PRN
Status: DISCONTINUED | OUTPATIENT
Start: 2023-08-13 | End: 2023-08-14 | Stop reason: HOSPADM

## 2023-08-13 RX ORDER — ASPIRIN 81 MG/1
81 TABLET, CHEWABLE ORAL DAILY
Status: DISCONTINUED | OUTPATIENT
Start: 2023-08-14 | End: 2023-08-14 | Stop reason: HOSPADM

## 2023-08-13 RX ORDER — POLYETHYLENE GLYCOL 3350 17 G/17G
17 POWDER, FOR SOLUTION ORAL DAILY PRN
Status: DISCONTINUED | OUTPATIENT
Start: 2023-08-13 | End: 2023-08-14 | Stop reason: HOSPADM

## 2023-08-13 RX ORDER — ONDANSETRON 4 MG/1
4 TABLET, ORALLY DISINTEGRATING ORAL EVERY 8 HOURS PRN
Status: DISCONTINUED | OUTPATIENT
Start: 2023-08-13 | End: 2023-08-14 | Stop reason: HOSPADM

## 2023-08-13 RX ORDER — POTASSIUM CHLORIDE 20 MEQ/1
40 TABLET, EXTENDED RELEASE ORAL PRN
Status: DISCONTINUED | OUTPATIENT
Start: 2023-08-13 | End: 2023-08-14 | Stop reason: HOSPADM

## 2023-08-13 RX ORDER — SODIUM CHLORIDE 0.9 % (FLUSH) 0.9 %
5-40 SYRINGE (ML) INJECTION EVERY 12 HOURS SCHEDULED
Status: DISCONTINUED | OUTPATIENT
Start: 2023-08-13 | End: 2023-08-14 | Stop reason: HOSPADM

## 2023-08-13 RX ORDER — SODIUM CHLORIDE 9 MG/ML
INJECTION, SOLUTION INTRAVENOUS PRN
Status: DISCONTINUED | OUTPATIENT
Start: 2023-08-13 | End: 2023-08-14 | Stop reason: HOSPADM

## 2023-08-13 RX ORDER — 0.9 % SODIUM CHLORIDE 0.9 %
100 INTRAVENOUS SOLUTION INTRAVENOUS
Status: COMPLETED | OUTPATIENT
Start: 2023-08-13 | End: 2023-08-13

## 2023-08-13 RX ORDER — SODIUM CHLORIDE 0.9 % (FLUSH) 0.9 %
10 SYRINGE (ML) INJECTION
Status: COMPLETED | OUTPATIENT
Start: 2023-08-13 | End: 2023-08-13

## 2023-08-13 RX ORDER — BISACODYL 10 MG
10 SUPPOSITORY, RECTAL RECTAL DAILY PRN
Status: DISCONTINUED | OUTPATIENT
Start: 2023-08-13 | End: 2023-08-14 | Stop reason: HOSPADM

## 2023-08-13 RX ORDER — ATORVASTATIN CALCIUM 40 MG/1
80 TABLET, FILM COATED ORAL NIGHTLY
Status: DISCONTINUED | OUTPATIENT
Start: 2023-08-13 | End: 2023-08-14 | Stop reason: HOSPADM

## 2023-08-13 RX ORDER — LABETALOL HYDROCHLORIDE 5 MG/ML
10 INJECTION, SOLUTION INTRAVENOUS EVERY 10 MIN PRN
Status: DISCONTINUED | OUTPATIENT
Start: 2023-08-13 | End: 2023-08-14 | Stop reason: HOSPADM

## 2023-08-13 RX ORDER — ASPIRIN 300 MG/1
300 SUPPOSITORY RECTAL DAILY
Status: DISCONTINUED | OUTPATIENT
Start: 2023-08-14 | End: 2023-08-14 | Stop reason: HOSPADM

## 2023-08-13 RX ORDER — ATORVASTATIN CALCIUM 10 MG/1
10 TABLET, FILM COATED ORAL DAILY
Status: ON HOLD | COMMUNITY
End: 2023-08-14 | Stop reason: SDUPTHER

## 2023-08-13 RX ADMIN — SODIUM CHLORIDE, PRESERVATIVE FREE 10 ML: 5 INJECTION INTRAVENOUS at 13:28

## 2023-08-13 RX ADMIN — IOPAMIDOL 50 ML: 755 INJECTION, SOLUTION INTRAVENOUS at 13:27

## 2023-08-13 RX ADMIN — SODIUM CHLORIDE 100 ML: 9 INJECTION, SOLUTION INTRAVENOUS at 13:28

## 2023-08-13 RX ADMIN — SODIUM CHLORIDE, PRESERVATIVE FREE 10 ML: 5 INJECTION INTRAVENOUS at 21:30

## 2023-08-13 RX ADMIN — DABIGATRAN ETEXILATE MESYLATE 150 MG: 75 CAPSULE ORAL at 21:30

## 2023-08-13 RX ADMIN — ATORVASTATIN CALCIUM 80 MG: 40 TABLET, FILM COATED ORAL at 21:29

## 2023-08-13 ASSESSMENT — PAIN SCALES - GENERAL
PAINLEVEL_OUTOF10: 7
PAINLEVEL_OUTOF10: 0
PAINLEVEL_OUTOF10: 9

## 2023-08-13 ASSESSMENT — PAIN DESCRIPTION - DESCRIPTORS: DESCRIPTORS: ACHING

## 2023-08-13 ASSESSMENT — PAIN DESCRIPTION - LOCATION
LOCATION: HAND;ARM
LOCATION: ARM

## 2023-08-13 ASSESSMENT — PAIN DESCRIPTION - FREQUENCY: FREQUENCY: CONTINUOUS

## 2023-08-13 ASSESSMENT — PAIN DESCRIPTION - ORIENTATION
ORIENTATION: RIGHT
ORIENTATION: RIGHT

## 2023-08-13 ASSESSMENT — PAIN - FUNCTIONAL ASSESSMENT
PAIN_FUNCTIONAL_ASSESSMENT: PREVENTS OR INTERFERES SOME ACTIVE ACTIVITIES AND ADLS
PAIN_FUNCTIONAL_ASSESSMENT: 0-10

## 2023-08-13 ASSESSMENT — PAIN DESCRIPTION - PAIN TYPE: TYPE: ACUTE PAIN

## 2023-08-13 ASSESSMENT — PAIN DESCRIPTION - ONSET: ONSET: GRADUAL

## 2023-08-13 NOTE — ED TRIAGE NOTES
Patient advises that he got up this morning and went to watch TV, after that around 1000 he started with right sided numbness and pain to arm and hand. Patient advises history of blood clots and currently on thinner. Patient with arm drift to right arm.

## 2023-08-13 NOTE — ED NOTES
Attempted to call report, Tech informed this RN, that nurse is unavailable at this time, they will call back     Verenice Rodriguez RN  08/13/23 3481

## 2023-08-13 NOTE — PROGRESS NOTES
TRANSFER - IN REPORT:    Verbal report received from Tania Dukes, [address] on [place] [address]  being received from ER for routine progression of patient care      Report consisted of patient's Situation, Background, Assessment and   Recommendations(SBAR). Information from the following report(s) Nurse Handoff Report, ED SBAR, and Adult Overview was reviewed with the receiving nurse. Opportunity for questions and clarification was provided. Assessment completed upon patient's arrival to unit and care assumed.

## 2023-08-13 NOTE — H&P
Hospitalist History and Physical   Admit Date:  2023 12:56 PM   Name:  Cem Alonso   Age:  81 y.o. Sex:  male  :  1935   MRN:  [id number]   Room:  04/    Presenting/Chief Complaint: Numbness     Reason(s) for Admission: Stroke-like symptoms [R29.90]     History of Present Illness:   Cem Alonso is a 81 y.o. male with medical history of stage 1 lung cancer s/p radiation, hx of OK on Pradaxa (previously failed/clotted on Eliquis), Dupuytren's contractures, HTN, hx of COVID, and Vertigo who presented to the ER as a code stroke. He got up this morning and went to watch TV, after that around 1000 he started with right sided numbness and pain to arm and hand. Has had some dysarthria but this is not new. Denies confusion, facial droop, slurred speech, chest pain, difficulty breathing, neck pain, fall. Initial exam in the ER notable for alert oriented x3, with right upper and lower extremity sensory loss, right palmar drift. Upper extremity ataxia. NIH: 3. Positive Romberg. Tele neurology was consulted. CT scan of the head with no acute process, CT angiogram head neck with no LVO. BP was systolic 310-459, Diastolic . EKG pending. Lab work unremarkable. Tele-neurology assessed him as having NIH of 2 (sensation changes to R UE and R LE as well as slight drift of limb). No tPA recommended 2/2 patient being on Pradaxa. They recommended MRI and echo bubble study, continue ASA (which he takes for prevention) and Pradaxa but if CVA noted on MRI, change OAC. Patient has a listed \"allergy\" to statin but he cannot remember what it was and denies anaphylaxis. Assessment & Plan:       Stroke-like symptoms - presenting symptoms are consistent with stroke.  Tele neurologist concerned for acute left subcortical infarct vs thalamic CVA  - MRI without contrast  - echo bubble study   - neuro checks q4 hours  - elevate HOB  - continue ASA and Pradaxa + high intensity

## 2023-08-13 NOTE — ED PROVIDER NOTES
Result Value Ref Range    Troponin, High Sensitivity 7.2 0 - 14 pg/mL   Lipid Panel   Result Value Ref Range    Cholesterol, Total 221 MG/DL    Triglycerides 171 (H) 35 - 150 MG/DL    HDL 46 40 - 60 MG/DL    LDL Calculated 140.8 (H) <100 MG/DL    VLDL Cholesterol Calculated 34.2 (H) 6.0 - 23.0 MG/DL    Chol/HDL Ratio 4.8 <200     Hemoglobin A1C   Result Value Ref Range    Hemoglobin A1C 6.1 (H) 4.8 - 5.6 %    eAG 128 mg/dL   TSH with Reflex   Result Value Ref Range    TSH w Free Thyroid if Abnormal 2.09 0.358 - 3.740 UIU/ML   POCT INR   Result Value Ref Range    POC Protime 17.0 (H) 9.6 - 11.6 SECS    POC INR 1.4 (H) 0.9 - 1.2     POCT Glucose   Result Value Ref Range    POC Glucose 100 65 - 100 mg/dL    Performed by: Jhon         CT HEAD WO CONTRAST   Final Result   Stable CT head without contrast. No acute intracranial abnormality. CTA HEAD NECK W CONTRAST   Final Result      No acute cerebrovascular occlusive disease. No hemodynamically significant   carotid artery stenosis. Large lateral ventricles. Partial collapse of the left lung. Recommend consideration of a dedicated CT   scan of the chest for further evaluation. DC4   The significant findings in this report have been referred to the Imaging   Navigator in order to communicate to the referring provider or his/her designee   as outlined in Section II.C.2.a.ii-iii of the ACR   Practice Guideline for Communication of Diagnostic Imaging Findings. MRI BRAIN WO CONTRAST    (Results Pending)                     Voice dictation software was used during the making of this note. This software is not perfect and grammatical and other typographical errors may be present. This note has not been completely proofread for errors.      KALIE Wilkins - ИРИНА  08/13/23 2017

## 2023-08-13 NOTE — ED NOTES
TRANSFER - OUT REPORT:    Verbal report given to Lesley Bravo RN on Lenox Simmonds  being transferred to [facility]  for routine progression of patient care       Report consisted of patient's Situation, Background, Assessment and   Recommendations(SBAR). Information from the following report(s) ED SBAR was reviewed with the receiving nurse. Lines:   Peripheral IV 08/13/23 Left;Proximal Forearm (Active)        Opportunity for questions and clarification was provided.       Patient transported with:  Monitor and Registered Nurse       Lesley Pat RN  08/13/23 5760

## 2023-08-14 ENCOUNTER — HOME HEALTH ADMISSION (OUTPATIENT)
Dept: HOME HEALTH SERVICES | Facility: HOME HEALTH | Age: 88
End: 2023-08-14

## 2023-08-14 ENCOUNTER — APPOINTMENT (OUTPATIENT)
Dept: MRI IMAGING | Age: 88
End: 2023-08-14
Payer: MEDICARE

## 2023-08-14 ENCOUNTER — APPOINTMENT (OUTPATIENT)
Dept: NON INVASIVE DIAGNOSTICS | Age: 88
End: 2023-08-14
Payer: MEDICARE

## 2023-08-14 VITALS
OXYGEN SATURATION: 92 % | HEIGHT: 66 IN | SYSTOLIC BLOOD PRESSURE: 143 MMHG | DIASTOLIC BLOOD PRESSURE: 88 MMHG | RESPIRATION RATE: 16 BRPM | TEMPERATURE: 97.6 F | BODY MASS INDEX: 28.93 KG/M2 | WEIGHT: 180 LBS | HEART RATE: 62 BPM

## 2023-08-14 PROBLEM — M50.30 DDD (DEGENERATIVE DISC DISEASE), CERVICAL: Status: ACTIVE | Noted: 2019-07-11

## 2023-08-14 PROBLEM — E78.2 MIXED DYSLIPIDEMIA: Status: ACTIVE | Noted: 2023-08-14

## 2023-08-14 PROBLEM — R73.03 PRE-DIABETES: Status: ACTIVE | Noted: 2023-08-14

## 2023-08-14 LAB
ANION GAP SERPL CALC-SCNC: 5 MMOL/L (ref 2–11)
BUN SERPL-MCNC: 18 MG/DL (ref 8–23)
CALCIUM SERPL-MCNC: 8.8 MG/DL (ref 8.3–10.4)
CHLORIDE SERPL-SCNC: 110 MMOL/L (ref 101–110)
CO2 SERPL-SCNC: 28 MMOL/L (ref 21–32)
CREAT SERPL-MCNC: 0.89 MG/DL (ref 0.8–1.5)
ECHO BSA: 1.95 M2
ECHO LV EDV A2C: 66 ML
ECHO LV EDV A4C: 83 ML
ECHO LV EDV INDEX A4C: 43 ML/M2
ECHO LV EDV NDEX A2C: 35 ML/M2
ECHO LV EJECTION FRACTION A2C: 55 %
ECHO LV EJECTION FRACTION A4C: 60 %
ECHO LV EJECTION FRACTION BIPLANE: 57 % (ref 55–100)
ECHO LV ESV A2C: 29 ML
ECHO LV ESV A4C: 33 ML
ECHO LV ESV INDEX A2C: 15 ML/M2
ECHO LV ESV INDEX A4C: 17 ML/M2
ERYTHROCYTE [DISTWIDTH] IN BLOOD BY AUTOMATED COUNT: 13.2 % (ref 11.9–14.6)
GLUCOSE SERPL-MCNC: 95 MG/DL (ref 65–100)
HCT VFR BLD AUTO: 40.6 % (ref 41.1–50.3)
HGB BLD-MCNC: 13 G/DL (ref 13.6–17.2)
MCH RBC QN AUTO: 31.6 PG (ref 26.1–32.9)
MCHC RBC AUTO-ENTMCNC: 32 G/DL (ref 31.4–35)
MCV RBC AUTO: 98.8 FL (ref 82–102)
NRBC # BLD: 0 K/UL (ref 0–0.2)
PLATELET # BLD AUTO: 198 K/UL (ref 150–450)
PMV BLD AUTO: 9.4 FL (ref 9.4–12.3)
POTASSIUM SERPL-SCNC: 3.8 MMOL/L (ref 3.5–5.1)
RBC # BLD AUTO: 4.11 M/UL (ref 4.23–5.6)
SODIUM SERPL-SCNC: 143 MMOL/L (ref 133–143)
WBC # BLD AUTO: 7 K/UL (ref 4.3–11.1)

## 2023-08-14 PROCEDURE — 97161 PT EVAL LOW COMPLEX 20 MIN: CPT

## 2023-08-14 PROCEDURE — 97165 OT EVAL LOW COMPLEX 30 MIN: CPT

## 2023-08-14 PROCEDURE — 80048 BASIC METABOLIC PNL TOTAL CA: CPT

## 2023-08-14 PROCEDURE — 2580000003 HC RX 258: Performed by: PHYSICIAN ASSISTANT

## 2023-08-14 PROCEDURE — G0378 HOSPITAL OBSERVATION PER HR: HCPCS

## 2023-08-14 PROCEDURE — 36415 COLL VENOUS BLD VENIPUNCTURE: CPT

## 2023-08-14 PROCEDURE — 6370000000 HC RX 637 (ALT 250 FOR IP): Performed by: PHYSICIAN ASSISTANT

## 2023-08-14 PROCEDURE — 97535 SELF CARE MNGMENT TRAINING: CPT

## 2023-08-14 PROCEDURE — 93308 TTE F-UP OR LMTD: CPT

## 2023-08-14 PROCEDURE — 97530 THERAPEUTIC ACTIVITIES: CPT

## 2023-08-14 PROCEDURE — 85027 COMPLETE CBC AUTOMATED: CPT

## 2023-08-14 PROCEDURE — 92610 EVALUATE SWALLOWING FUNCTION: CPT

## 2023-08-14 PROCEDURE — 70551 MRI BRAIN STEM W/O DYE: CPT

## 2023-08-14 RX ORDER — ATORVASTATIN CALCIUM 40 MG/1
40 TABLET, FILM COATED ORAL DAILY
Qty: 30 TABLET | Refills: 0 | Status: SHIPPED | OUTPATIENT
Start: 2023-08-14

## 2023-08-14 RX ADMIN — ACETAMINOPHEN 650 MG: 325 TABLET, FILM COATED ORAL at 08:31

## 2023-08-14 RX ADMIN — FINASTERIDE 5 MG: 5 TABLET, FILM COATED ORAL at 08:27

## 2023-08-14 RX ADMIN — DABIGATRAN ETEXILATE MESYLATE 150 MG: 75 CAPSULE ORAL at 08:27

## 2023-08-14 RX ADMIN — SODIUM CHLORIDE, PRESERVATIVE FREE 10 ML: 5 INJECTION INTRAVENOUS at 08:33

## 2023-08-14 ASSESSMENT — PAIN SCALES - GENERAL
PAINLEVEL_OUTOF10: 5
PAINLEVEL_OUTOF10: 7

## 2023-08-14 ASSESSMENT — PAIN DESCRIPTION - DESCRIPTORS
DESCRIPTORS: ACHING
DESCRIPTORS: ACHING

## 2023-08-14 ASSESSMENT — PAIN DESCRIPTION - ORIENTATION
ORIENTATION: LEFT
ORIENTATION: LEFT

## 2023-08-14 ASSESSMENT — PAIN DESCRIPTION - LOCATION
LOCATION: KNEE
LOCATION: KNEE

## 2023-08-14 NOTE — CARE COORDINATION
08/14/23 1451   Service Assessment   Patient Orientation Alert and Oriented   Cognition Alert   History Provided By Patient   Primary Caregiver Self   Accompanied By/Relationship spouse   Support Systems Spouse/Significant Other   Patient's Healthcare Decision Maker is: Legal Next of Kin   PCP Verified by CM Yes   Last Visit to PCP Within last 3 months   Prior Functional Level Independent in ADLs/IADLs   Current Functional Level Independent in ADLs/IADLs   Can patient return to prior living arrangement Yes   Ability to make needs known: Good   Family able to assist with home care needs: Yes   Would you like for me to discuss the discharge plan with any other family members/significant others, and if so, who? Yes  (spouse)   Financial Resources Medicare   Community Resources None   Social/Functional History   Lives With Spouse   Type of Home House   Condition of Participation: Discharge Planning   The Plan for Transition of Care is related to the following treatment goals: return home with spouse   The Patient and/or Patient Representative was provided with a Choice of Provider? Patient   The Patient and/Or Patient Representative agree with the Discharge Plan? Yes   Freedom of Choice list was provided with basic dialogue that supports the patient's individualized plan of care/goals, treatment preferences, and shares the quality data associated with the providers? Yes     Assessment completed with patient at bedside. Patient alert and oriented and accompanied by his spouse. Patient is IND at baseline. CM discussed home health recommendation, patient is agreeable and selected [facility]. Referral sent. No further CM needs. Update: [facility] could not accept patient, referral sent to interim.          Darryle Pringle [facility], [address] [facility]

## 2023-08-14 NOTE — PROGRESS NOTES
ACUTE OCCUPATIONAL THERAPY GOALS:   (Developed with and agreed upon by patient and/or caregiver.)  1. Patient will perform grooming with SPV and adaptive equipment as needed. 2. Patient will perform toileting and toilet transfer with SPV and adaptive equipment as needed. 3. Patient will perform ADL functional mobility and tranfers in room with SPV and adaptive equipment as needed. 4. Patient/family to demonstrate knowledge of home safety and DME recommendations. Timeframe: 7 visits     ALL GOALS MET 8/14/23     OCCUPATIONAL THERAPY Initial Assessment, Daily Note, Discharge, and AM       OT Visit Days: 1  Acknowledge Orders  Time  OT Charge Capture  Rehab Caseload Tracker      Markos Amezcua is a 81 y.o. male   PRIMARY DIAGNOSIS: Stroke-like symptoms  Stroke-like symptoms [R29.90]       Reason for Referral: Generalized Muscle Weakness (M62.81)  Other lack of cordination (R27.8)  Observation: Payor: MEDICARE / Plan: MEDICARE PART A AND B / Product Type: *No Product type* /     ASSESSMENT:     REHAB RECOMMENDATIONS:   Recommendation to date pending progress:  Setting:  No further skilled occupational therapy after discharge from hospital    Equipment:    None     ASSESSMENT:  Mr. Maureen Guzman is admitted for the above diagnoses and presents with overall deficits in strength, functional mobility and ADL performance. At baseline, he lives with spouse (retired RN) in a 1 level home with 1-2 steps to enter. He reports independence with ADLs and with mobility using a cane as needed. Today, he was able to perform bed mobility, fine motor task, functional household mobility, toilet transfer, and grooming tasks at sink with assistance as charted below. Presents with ongoing decreased sensation in R forearm and hand to light touch, and chronic pain in knees. He was left in recliner chair with all needs met and in reach. He denies any OT-related questions or concerns. He is functioning very close to his baseline.  No

## 2023-08-14 NOTE — PROGRESS NOTES
SPEECH PATHOLOGY NOTE:    Speech therapy consult received and appreciated. Attempted to see patient this PM for bedside swallow evaluation. Patient is currently off the floor for MRI. Will re-attempt at later time/date as patient becomes available.        Anita Zhao MS, CCC-SLP

## 2023-08-14 NOTE — PROGRESS NOTES
Discharge instructions were reviewed with the patient. Opportunity for questions given. Patient verbalized understanding with discharge and follow up instructions, as well as signs and symptoms to report to MD or to return to ER for. PIV was removed, Rx's were provided. Pt will D/c to home.

## 2023-08-15 ENCOUNTER — TELEPHONE (OUTPATIENT)
Dept: INTERNAL MEDICINE CLINIC | Facility: CLINIC | Age: 88
End: 2023-08-15

## 2023-08-15 NOTE — TELEPHONE ENCOUNTER
1st attempt to contact patient using phone number listed in chart following hospital discharge 08/15/2023  No VM set up.     Care Transitions Initial Follow Up Call    Outreach made within 2 business days of discharge: Yes    Patient: Cem Alonso Patient : 1935   MRN: [id number]  Reason for Admission:  Stroke-like symptoms   Discharge Date: 23       Spoke with: 1st attempt VM full    Discharge department/facility: [address] [place] [address]

## 2023-08-25 ENCOUNTER — OFFICE VISIT (OUTPATIENT)
Dept: NEUROLOGY | Age: 88
End: 2023-08-25

## 2023-08-25 VITALS
HEART RATE: 80 BPM | WEIGHT: 185.2 LBS | DIASTOLIC BLOOD PRESSURE: 87 MMHG | BODY MASS INDEX: 29.77 KG/M2 | OXYGEN SATURATION: 95 % | SYSTOLIC BLOOD PRESSURE: 148 MMHG | HEIGHT: 66 IN

## 2023-08-25 DIAGNOSIS — R29.90 STROKE-LIKE SYMPTOMS: ICD-10-CM

## 2023-08-25 DIAGNOSIS — Z09 HOSPITAL DISCHARGE FOLLOW-UP: Primary | ICD-10-CM

## 2023-08-25 DIAGNOSIS — G47.33 OSA (OBSTRUCTIVE SLEEP APNEA): ICD-10-CM

## 2023-08-25 DIAGNOSIS — I27.82 OTHER CHRONIC PULMONARY EMBOLISM WITHOUT ACUTE COR PULMONALE (HCC): ICD-10-CM

## 2023-08-25 DIAGNOSIS — R41.3 MEMORY DIFFICULTY: ICD-10-CM

## 2023-08-25 DIAGNOSIS — R20.0 NUMBNESS AND TINGLING OF RIGHT ARM: ICD-10-CM

## 2023-08-25 DIAGNOSIS — R20.2 NUMBNESS AND TINGLING OF RIGHT ARM: ICD-10-CM

## 2023-08-25 ASSESSMENT — PATIENT HEALTH QUESTIONNAIRE - PHQ9
SUM OF ALL RESPONSES TO PHQ QUESTIONS 1-9: 0
SUM OF ALL RESPONSES TO PHQ9 QUESTIONS 1 & 2: 0
2. FEELING DOWN, DEPRESSED OR HOPELESS: 0
SUM OF ALL RESPONSES TO PHQ QUESTIONS 1-9: 0
1. LITTLE INTEREST OR PLEASURE IN DOING THINGS: 0

## 2023-08-25 ASSESSMENT — ENCOUNTER SYMPTOMS
GASTROINTESTINAL NEGATIVE: 1
EYES NEGATIVE: 1
RESPIRATORY NEGATIVE: 1
ALLERGIC/IMMUNOLOGIC NEGATIVE: 1

## 2023-08-25 NOTE — PROGRESS NOTES
[facility]  [address] [facility]  [address] [facility]  [facility]      Chief Complaint   Patient presents with    Follow-Up from Hospital     Stroke like symptoms        Mendez Lara is a 81 y.o. male who presents for hospital follow up for stroke like symptoms. PMH significant for stage 1 lung cancer s/p radiation, hx of PE on Pradaxa (previously failed/clotted on Eliquis), DDD of C-spine s/p C5 discectomy, Dupuytren's contractures, HTN no longer on antihypertensives and Vertigo who presented to the ER on 8/13/23 with sensory changes on RUE/RLE. Initial NIH 3. No tPA recommended 2/2 patient being on Pradaxa. CT scan of the head with no acute process, CT angiogram head neck with no LVO; incidental finding of partial collapsed lung. MRI of the brain showed cerebral and cerebellar involutional changes with confluent chronic periventricular white matter disease however without evidence of acute intracranial abnormality. prior echo was essentially unremarkable in June 2023 with only mild LA dilation. Currently on Asa, pradaxa and atorvastatin 40 mg daily. Interval history:  He is here today with his spouse. RHM, continues to endorse short term memory difficulties, chronic Teller, and distal right UE numbness/tingling. Hx of C5 discectomy. Denies neck pain, falls, weakness, visual, bowel/bladder changes, swallowing or speech changes. He requires  moderate assistance with ADLs. He is currently taking ASA and atorvastatin for secondary stroke prevention. Chronic left knee pain- patient was scheduled for knee arthroplasty, however unable to have procedure due to PE (on pradaxa). He currently ambulates with cane and is wearing compressive knee brace. Hx of PE- on pradaxa, tolerating medication without side effects. Discussed CTA results with partial collapsed lung, patient stated this is chronic finding s/p radiation therapy for lung cancer. Former tobacco use.  Denies alcohol or

## 2023-09-05 ENCOUNTER — HOSPITAL ENCOUNTER (INPATIENT)
Age: 88
LOS: 2 days | Discharge: HOME | DRG: 552 | End: 2023-09-08
Attending: STUDENT IN AN ORGANIZED HEALTH CARE EDUCATION/TRAINING PROGRAM | Admitting: INTERNAL MEDICINE
Payer: MEDICARE

## 2023-09-05 ENCOUNTER — APPOINTMENT (OUTPATIENT)
Dept: CT IMAGING | Age: 88
DRG: 552 | End: 2023-09-05
Payer: MEDICARE

## 2023-09-05 DIAGNOSIS — M48.062 SPINAL STENOSIS OF LUMBAR REGION WITH NEUROGENIC CLAUDICATION: ICD-10-CM

## 2023-09-05 DIAGNOSIS — M50.30 DDD (DEGENERATIVE DISC DISEASE), CERVICAL: ICD-10-CM

## 2023-09-05 DIAGNOSIS — S39.012A BACK STRAIN, INITIAL ENCOUNTER: Primary | ICD-10-CM

## 2023-09-05 PROBLEM — M54.50 ACUTE LOW BACK PAIN: Status: ACTIVE | Noted: 2023-09-05

## 2023-09-05 PROBLEM — R26.2 DIFFICULTY IN WALKING: Status: ACTIVE | Noted: 2023-09-05

## 2023-09-05 PROBLEM — M54.89 OTHER ACUTE BACK PAIN: Status: ACTIVE | Noted: 2023-09-05

## 2023-09-05 PROBLEM — M54.9 OTHER ACUTE BACK PAIN: Status: ACTIVE | Noted: 2023-09-05

## 2023-09-05 LAB
ALBUMIN SERPL-MCNC: 3.4 G/DL (ref 3.2–4.6)
ALBUMIN/GLOB SERPL: 0.8 (ref 0.4–1.6)
ALP SERPL-CCNC: 70 U/L (ref 50–136)
ALT SERPL-CCNC: 28 U/L (ref 12–65)
ANION GAP SERPL CALC-SCNC: 2 MMOL/L (ref 2–11)
AST SERPL-CCNC: 16 U/L (ref 15–37)
BASOPHILS # BLD: 0 K/UL (ref 0–0.2)
BASOPHILS NFR BLD: 0 % (ref 0–2)
BILIRUB SERPL-MCNC: 1.2 MG/DL (ref 0.2–1.1)
BUN SERPL-MCNC: 21 MG/DL (ref 8–23)
CALCIUM SERPL-MCNC: 9.2 MG/DL (ref 8.3–10.4)
CHLORIDE SERPL-SCNC: 107 MMOL/L (ref 101–110)
CO2 SERPL-SCNC: 30 MMOL/L (ref 21–32)
CREAT SERPL-MCNC: 0.94 MG/DL (ref 0.8–1.5)
DIFFERENTIAL METHOD BLD: ABNORMAL
EOSINOPHIL # BLD: 0.2 K/UL (ref 0–0.8)
EOSINOPHIL NFR BLD: 1 % (ref 0.5–7.8)
ERYTHROCYTE [DISTWIDTH] IN BLOOD BY AUTOMATED COUNT: 13.8 % (ref 11.9–14.6)
GLOBULIN SER CALC-MCNC: 4.1 G/DL (ref 2.8–4.5)
GLUCOSE SERPL-MCNC: 104 MG/DL (ref 65–100)
HCT VFR BLD AUTO: 40.7 % (ref 41.1–50.3)
HGB BLD-MCNC: 13.1 G/DL (ref 13.6–17.2)
IMM GRANULOCYTES # BLD AUTO: 0.1 K/UL (ref 0–0.5)
IMM GRANULOCYTES NFR BLD AUTO: 1 % (ref 0–5)
LYMPHOCYTES # BLD: 1.4 K/UL (ref 0.5–4.6)
LYMPHOCYTES NFR BLD: 11 % (ref 13–44)
MCH RBC QN AUTO: 32.3 PG (ref 26.1–32.9)
MCHC RBC AUTO-ENTMCNC: 32.2 G/DL (ref 31.4–35)
MCV RBC AUTO: 100.2 FL (ref 82–102)
MONOCYTES # BLD: 1.3 K/UL (ref 0.1–1.3)
MONOCYTES NFR BLD: 10 % (ref 4–12)
NEUTS SEG # BLD: 9.5 K/UL (ref 1.7–8.2)
NEUTS SEG NFR BLD: 77 % (ref 43–78)
NRBC # BLD: 0 K/UL (ref 0–0.2)
PLATELET # BLD AUTO: 174 K/UL (ref 150–450)
PMV BLD AUTO: 9.2 FL (ref 9.4–12.3)
POTASSIUM SERPL-SCNC: 4.2 MMOL/L (ref 3.5–5.1)
PROT SERPL-MCNC: 7.5 G/DL (ref 6.3–8.2)
RBC # BLD AUTO: 4.06 M/UL (ref 4.23–5.6)
SODIUM SERPL-SCNC: 139 MMOL/L (ref 133–143)
WBC # BLD AUTO: 12.4 K/UL (ref 4.3–11.1)

## 2023-09-05 PROCEDURE — 96376 TX/PRO/DX INJ SAME DRUG ADON: CPT

## 2023-09-05 PROCEDURE — 96375 TX/PRO/DX INJ NEW DRUG ADDON: CPT

## 2023-09-05 PROCEDURE — 85025 COMPLETE CBC W/AUTO DIFF WBC: CPT

## 2023-09-05 PROCEDURE — 96374 THER/PROPH/DIAG INJ IV PUSH: CPT

## 2023-09-05 PROCEDURE — 74177 CT ABD & PELVIS W/CONTRAST: CPT

## 2023-09-05 PROCEDURE — 6360000002 HC RX W HCPCS: Performed by: STUDENT IN AN ORGANIZED HEALTH CARE EDUCATION/TRAINING PROGRAM

## 2023-09-05 PROCEDURE — 6370000000 HC RX 637 (ALT 250 FOR IP): Performed by: STUDENT IN AN ORGANIZED HEALTH CARE EDUCATION/TRAINING PROGRAM

## 2023-09-05 PROCEDURE — 99285 EMERGENCY DEPT VISIT HI MDM: CPT

## 2023-09-05 PROCEDURE — 6360000002 HC RX W HCPCS: Performed by: HOSPITALIST

## 2023-09-05 PROCEDURE — 6360000004 HC RX CONTRAST MEDICATION: Performed by: STUDENT IN AN ORGANIZED HEALTH CARE EDUCATION/TRAINING PROGRAM

## 2023-09-05 PROCEDURE — 80053 COMPREHEN METABOLIC PANEL: CPT

## 2023-09-05 PROCEDURE — 2580000003 HC RX 258: Performed by: STUDENT IN AN ORGANIZED HEALTH CARE EDUCATION/TRAINING PROGRAM

## 2023-09-05 PROCEDURE — G0378 HOSPITAL OBSERVATION PER HR: HCPCS

## 2023-09-05 RX ORDER — METHOCARBAMOL 500 MG/1
500 TABLET, FILM COATED ORAL 4 TIMES DAILY
Qty: 20 TABLET | Refills: 0 | Status: SHIPPED | OUTPATIENT
Start: 2023-09-05 | End: 2023-09-08 | Stop reason: SDUPTHER

## 2023-09-05 RX ORDER — LIDOCAINE 50 MG/G
1 PATCH TOPICAL DAILY
Qty: 10 PATCH | Refills: 0 | Status: SHIPPED | OUTPATIENT
Start: 2023-09-05 | End: 2023-09-08 | Stop reason: SDUPTHER

## 2023-09-05 RX ORDER — MAGNESIUM HYDROXIDE/ALUMINUM HYDROXICE/SIMETHICONE 120; 1200; 1200 MG/30ML; MG/30ML; MG/30ML
30 SUSPENSION ORAL EVERY 6 HOURS PRN
Status: DISCONTINUED | OUTPATIENT
Start: 2023-09-05 | End: 2023-09-08 | Stop reason: HOSPADM

## 2023-09-05 RX ORDER — POLYETHYLENE GLYCOL 3350 17 G/17G
17 POWDER, FOR SOLUTION ORAL DAILY PRN
Status: DISCONTINUED | OUTPATIENT
Start: 2023-09-05 | End: 2023-09-08 | Stop reason: HOSPADM

## 2023-09-05 RX ORDER — ONDANSETRON 2 MG/ML
4 INJECTION INTRAMUSCULAR; INTRAVENOUS EVERY 6 HOURS PRN
Status: DISCONTINUED | OUTPATIENT
Start: 2023-09-05 | End: 2023-09-08 | Stop reason: HOSPADM

## 2023-09-05 RX ORDER — HYDROCODONE BITARTRATE AND ACETAMINOPHEN 5; 325 MG/1; MG/1
1 TABLET ORAL EVERY 6 HOURS PRN
Status: DISCONTINUED | OUTPATIENT
Start: 2023-09-05 | End: 2023-09-06

## 2023-09-05 RX ORDER — ONDANSETRON 2 MG/ML
4 INJECTION INTRAMUSCULAR; INTRAVENOUS
Status: COMPLETED | OUTPATIENT
Start: 2023-09-05 | End: 2023-09-05

## 2023-09-05 RX ORDER — ONDANSETRON 4 MG/1
4 TABLET, ORALLY DISINTEGRATING ORAL EVERY 8 HOURS PRN
Status: DISCONTINUED | OUTPATIENT
Start: 2023-09-05 | End: 2023-09-08 | Stop reason: HOSPADM

## 2023-09-05 RX ORDER — SODIUM CHLORIDE 0.9 % (FLUSH) 0.9 %
10 SYRINGE (ML) INJECTION
Status: COMPLETED | OUTPATIENT
Start: 2023-09-05 | End: 2023-09-05

## 2023-09-05 RX ORDER — DABIGATRAN ETEXILATE 75 MG/1
150 CAPSULE ORAL 2 TIMES DAILY
Status: DISCONTINUED | OUTPATIENT
Start: 2023-09-06 | End: 2023-09-08 | Stop reason: HOSPADM

## 2023-09-05 RX ORDER — HYDROCODONE BITARTRATE AND ACETAMINOPHEN 5; 325 MG/1; MG/1
1 TABLET ORAL
Status: COMPLETED | OUTPATIENT
Start: 2023-09-05 | End: 2023-09-05

## 2023-09-05 RX ORDER — MORPHINE SULFATE 2 MG/ML
2 INJECTION, SOLUTION INTRAMUSCULAR; INTRAVENOUS EVERY 8 HOURS PRN
Status: DISCONTINUED | OUTPATIENT
Start: 2023-09-05 | End: 2023-09-06

## 2023-09-05 RX ORDER — 0.9 % SODIUM CHLORIDE 0.9 %
100 INTRAVENOUS SOLUTION INTRAVENOUS ONCE
Status: COMPLETED | OUTPATIENT
Start: 2023-09-05 | End: 2023-09-05

## 2023-09-05 RX ORDER — MORPHINE SULFATE 2 MG/ML
2 INJECTION, SOLUTION INTRAMUSCULAR; INTRAVENOUS ONCE
Status: COMPLETED | OUTPATIENT
Start: 2023-09-05 | End: 2023-09-05

## 2023-09-05 RX ORDER — HYDROCODONE BITARTRATE AND ACETAMINOPHEN 5; 325 MG/1; MG/1
1 TABLET ORAL EVERY 6 HOURS PRN
Qty: 10 TABLET | Refills: 0 | Status: SHIPPED | OUTPATIENT
Start: 2023-09-05 | End: 2023-09-08 | Stop reason: HOSPADM

## 2023-09-05 RX ORDER — ACETAMINOPHEN 650 MG/1
650 SUPPOSITORY RECTAL EVERY 6 HOURS PRN
Status: DISCONTINUED | OUTPATIENT
Start: 2023-09-05 | End: 2023-09-06

## 2023-09-05 RX ORDER — LIDOCAINE 4 G/G
1 PATCH TOPICAL
Status: COMPLETED | OUTPATIENT
Start: 2023-09-05 | End: 2023-09-06

## 2023-09-05 RX ORDER — SODIUM CHLORIDE 9 MG/ML
INJECTION, SOLUTION INTRAVENOUS PRN
Status: DISCONTINUED | OUTPATIENT
Start: 2023-09-05 | End: 2023-09-08 | Stop reason: HOSPADM

## 2023-09-05 RX ORDER — SODIUM CHLORIDE 0.9 % (FLUSH) 0.9 %
5-40 SYRINGE (ML) INJECTION EVERY 12 HOURS SCHEDULED
Status: DISCONTINUED | OUTPATIENT
Start: 2023-09-06 | End: 2023-09-08 | Stop reason: HOSPADM

## 2023-09-05 RX ORDER — SODIUM CHLORIDE 0.9 % (FLUSH) 0.9 %
5-40 SYRINGE (ML) INJECTION PRN
Status: DISCONTINUED | OUTPATIENT
Start: 2023-09-05 | End: 2023-09-08 | Stop reason: HOSPADM

## 2023-09-05 RX ORDER — ACETAMINOPHEN 325 MG/1
650 TABLET ORAL EVERY 6 HOURS PRN
Status: DISCONTINUED | OUTPATIENT
Start: 2023-09-05 | End: 2023-09-06

## 2023-09-05 RX ORDER — ASPIRIN 81 MG/1
81 TABLET, CHEWABLE ORAL DAILY
Status: DISCONTINUED | OUTPATIENT
Start: 2023-09-06 | End: 2023-09-08 | Stop reason: HOSPADM

## 2023-09-05 RX ADMIN — SODIUM CHLORIDE, PRESERVATIVE FREE 10 ML: 5 INJECTION INTRAVENOUS at 19:47

## 2023-09-05 RX ADMIN — MORPHINE SULFATE 2 MG: 2 INJECTION, SOLUTION INTRAMUSCULAR; INTRAVENOUS at 20:35

## 2023-09-05 RX ADMIN — ONDANSETRON 4 MG: 2 INJECTION INTRAMUSCULAR; INTRAVENOUS at 18:34

## 2023-09-05 RX ADMIN — HYDROCODONE BITARTRATE AND ACETAMINOPHEN 1 TABLET: 5; 325 TABLET ORAL at 22:19

## 2023-09-05 RX ADMIN — MORPHINE SULFATE 2 MG: 2 INJECTION, SOLUTION INTRAMUSCULAR; INTRAVENOUS at 23:47

## 2023-09-05 RX ADMIN — MORPHINE SULFATE 2 MG: 2 INJECTION, SOLUTION INTRAMUSCULAR; INTRAVENOUS at 18:37

## 2023-09-05 RX ADMIN — IOPAMIDOL 100 ML: 755 INJECTION, SOLUTION INTRAVENOUS at 19:46

## 2023-09-05 RX ADMIN — SODIUM CHLORIDE 100 ML: 9 INJECTION, SOLUTION INTRAVENOUS at 19:47

## 2023-09-05 ASSESSMENT — PAIN DESCRIPTION - ORIENTATION: ORIENTATION: LOWER

## 2023-09-05 ASSESSMENT — PAIN DESCRIPTION - LOCATION
LOCATION: BACK
LOCATION: BACK
LOCATION: GENERALIZED;BACK

## 2023-09-05 ASSESSMENT — PAIN SCALES - GENERAL
PAINLEVEL_OUTOF10: 7
PAINLEVEL_OUTOF10: 8
PAINLEVEL_OUTOF10: 7
PAINLEVEL_OUTOF10: 7
PAINLEVEL_OUTOF10: 8
PAINLEVEL_OUTOF10: 7

## 2023-09-05 ASSESSMENT — PAIN DESCRIPTION - DESCRIPTORS: DESCRIPTORS: TENDER

## 2023-09-05 NOTE — ED PROVIDER NOTES
Emergency Department Provider Note       PCP: Caron Lara MD   Age: 81 y.o. Sex: male     DISPOSITION Decision To Admit 09/05/2023 10:18:48 PM       ICD-10-CM    1. Back strain, initial encounter  S39.012A HYDROcodone-acetaminophen (NORCO) 5-325 MG per tablet      2. DDD (degenerative disc disease), cervical  M50.30           Medical Decision Making     Complexity of Problems Addressed:  1 or more acute illnesses that pose a threat to life or bodily function. Data Reviewed and Analyzed:  I independently ordered and reviewed each unique test.  I reviewed external records: provider visit note from outside specialist.   The patients assessment required an independent historian: son. The reason they were needed is important historical information not provided by the patient. I interpreted the CT Scan no pneumoperitoneum. Discussion of management or test interpretation. 52-year-old male presents to the emergency department with family at bedside. Patient complains of severe left-sided low back pain as well as left midline back pain with pain rating down his left lower extremity that began mildly last night, more severe today. No abdominal pain. No nausea, vomiting or diarrhea. No history of back injuries. Was unable to ambulate today secondary to the severity of the pain. Family attempted to help him ambulate to the vehicle but he took 2 steps and then nearly collapsed. EMS was called at that time. Denies any trauma or recent overuse. Denies fever, chills, numbness or weakness in lower extremities. No saddle anesthesia. No urinary or fecal retention or incontinence. Will obtain a broad-based work-up. Will give morphine as well as Zofran. Lab works unremarkable, white count 12.4, stable H&H, normal electrolytes and kidney function, no abdominal pain whatsoever. CT scan without any emergent findings, no fractures or obvious deformities noted on the lumbar spine.   Patient did have

## 2023-09-05 NOTE — ED NOTES
Report given to [facility], transfer of care.      Vernell Marroquin RN  09/05/23 8172

## 2023-09-06 ENCOUNTER — APPOINTMENT (OUTPATIENT)
Dept: CT IMAGING | Age: 88
DRG: 552 | End: 2023-09-06
Payer: MEDICARE

## 2023-09-06 PROBLEM — M51.369 DEGENERATIVE DISC DISEASE, LUMBAR: Chronic | Status: ACTIVE | Noted: 2023-09-05

## 2023-09-06 PROBLEM — M51.36 DEGENERATIVE DISC DISEASE, LUMBAR: Status: ACTIVE | Noted: 2023-09-05

## 2023-09-06 PROBLEM — M48.061 LUMBAR STENOSIS: Status: ACTIVE | Noted: 2023-09-05

## 2023-09-06 PROBLEM — M51.36 DEGENERATIVE DISC DISEASE, LUMBAR: Chronic | Status: ACTIVE | Noted: 2023-09-05

## 2023-09-06 PROBLEM — M51.369 DEGENERATIVE DISC DISEASE, LUMBAR: Status: ACTIVE | Noted: 2023-09-05

## 2023-09-06 PROBLEM — E78.5 HLD (HYPERLIPIDEMIA): Chronic | Status: ACTIVE | Noted: 2023-02-19

## 2023-09-06 PROBLEM — M48.062 SPINAL STENOSIS OF LUMBAR REGION WITH NEUROGENIC CLAUDICATION: Status: ACTIVE | Noted: 2023-09-05

## 2023-09-06 PROBLEM — Z86.711 HISTORY OF PULMONARY EMBOLUS (PE): Chronic | Status: ACTIVE | Noted: 2022-10-21

## 2023-09-06 PROBLEM — I10 ESSENTIAL HYPERTENSION: Chronic | Status: ACTIVE | Noted: 2020-05-12

## 2023-09-06 LAB
ANION GAP SERPL CALC-SCNC: 2 MMOL/L (ref 2–11)
APPEARANCE UR: CLEAR
BACTERIA URNS QL MICRO: NEGATIVE /HPF
BILIRUB UR QL: NEGATIVE
BUN SERPL-MCNC: 19 MG/DL (ref 8–23)
CALCIUM SERPL-MCNC: 8.7 MG/DL (ref 8.3–10.4)
CASTS URNS QL MICRO: ABNORMAL /LPF
CHLORIDE SERPL-SCNC: 107 MMOL/L (ref 101–110)
CO2 SERPL-SCNC: 31 MMOL/L (ref 21–32)
COLOR UR: ABNORMAL
CREAT SERPL-MCNC: 1 MG/DL (ref 0.8–1.5)
EPI CELLS #/AREA URNS HPF: ABNORMAL /HPF
ERYTHROCYTE [DISTWIDTH] IN BLOOD BY AUTOMATED COUNT: 13.9 % (ref 11.9–14.6)
GLUCOSE SERPL-MCNC: 96 MG/DL (ref 65–100)
GLUCOSE UR STRIP.AUTO-MCNC: NEGATIVE MG/DL
HCT VFR BLD AUTO: 38.1 % (ref 41.1–50.3)
HGB BLD-MCNC: 12.3 G/DL (ref 13.6–17.2)
HGB UR QL STRIP: NEGATIVE
KETONES UR QL STRIP.AUTO: NEGATIVE MG/DL
LEUKOCYTE ESTERASE UR QL STRIP.AUTO: NEGATIVE
MCH RBC QN AUTO: 32.6 PG (ref 26.1–32.9)
MCHC RBC AUTO-ENTMCNC: 32.3 G/DL (ref 31.4–35)
MCV RBC AUTO: 101.1 FL (ref 82–102)
MUCOUS THREADS URNS QL MICRO: 0 /LPF
NITRITE UR QL STRIP.AUTO: NEGATIVE
NRBC # BLD: 0 K/UL (ref 0–0.2)
PH UR STRIP: 5.5 (ref 5–9)
PLATELET # BLD AUTO: 168 K/UL (ref 150–450)
PMV BLD AUTO: 9.2 FL (ref 9.4–12.3)
POTASSIUM SERPL-SCNC: 3.9 MMOL/L (ref 3.5–5.1)
PROT UR STRIP-MCNC: ABNORMAL MG/DL
RBC # BLD AUTO: 3.77 M/UL (ref 4.23–5.6)
RBC #/AREA URNS HPF: ABNORMAL /HPF
SODIUM SERPL-SCNC: 140 MMOL/L (ref 133–143)
SP GR UR REFRACTOMETRY: 1.02 (ref 1–1.02)
URINE CULTURE IF INDICATED: ABNORMAL
UROBILINOGEN UR QL STRIP.AUTO: 0.2 EU/DL (ref 0.2–1)
WBC # BLD AUTO: 10.6 K/UL (ref 4.3–11.1)
WBC URNS QL MICRO: ABNORMAL /HPF

## 2023-09-06 PROCEDURE — 80048 BASIC METABOLIC PNL TOTAL CA: CPT

## 2023-09-06 PROCEDURE — 85027 COMPLETE CBC AUTOMATED: CPT

## 2023-09-06 PROCEDURE — G0378 HOSPITAL OBSERVATION PER HR: HCPCS

## 2023-09-06 PROCEDURE — 81001 URINALYSIS AUTO W/SCOPE: CPT

## 2023-09-06 PROCEDURE — 6360000002 HC RX W HCPCS: Performed by: HOSPITALIST

## 2023-09-06 PROCEDURE — 1100000000 HC RM PRIVATE

## 2023-09-06 PROCEDURE — 72131 CT LUMBAR SPINE W/O DYE: CPT

## 2023-09-06 PROCEDURE — 96376 TX/PRO/DX INJ SAME DRUG ADON: CPT

## 2023-09-06 PROCEDURE — 2580000003 HC RX 258: Performed by: HOSPITALIST

## 2023-09-06 PROCEDURE — 36415 COLL VENOUS BLD VENIPUNCTURE: CPT

## 2023-09-06 PROCEDURE — 2500000003 HC RX 250 WO HCPCS: Performed by: INTERNAL MEDICINE

## 2023-09-06 PROCEDURE — 6370000000 HC RX 637 (ALT 250 FOR IP): Performed by: INTERNAL MEDICINE

## 2023-09-06 PROCEDURE — 6370000000 HC RX 637 (ALT 250 FOR IP): Performed by: HOSPITALIST

## 2023-09-06 RX ORDER — IBUPROFEN 600 MG/1
600 TABLET ORAL
Status: DISCONTINUED | OUTPATIENT
Start: 2023-09-06 | End: 2023-09-08

## 2023-09-06 RX ORDER — ACETAMINOPHEN 500 MG
1000 TABLET ORAL EVERY 6 HOURS SCHEDULED
Status: DISCONTINUED | OUTPATIENT
Start: 2023-09-06 | End: 2023-09-08 | Stop reason: HOSPADM

## 2023-09-06 RX ORDER — OXYCODONE HYDROCHLORIDE 5 MG/1
5 TABLET ORAL EVERY 4 HOURS PRN
Status: DISCONTINUED | OUTPATIENT
Start: 2023-09-06 | End: 2023-09-08 | Stop reason: HOSPADM

## 2023-09-06 RX ORDER — PANTOPRAZOLE SODIUM 40 MG/1
40 TABLET, DELAYED RELEASE ORAL
Status: DISCONTINUED | OUTPATIENT
Start: 2023-09-06 | End: 2023-09-08 | Stop reason: HOSPADM

## 2023-09-06 RX ADMIN — DABIGATRAN ETEXILATE MESYLATE 150 MG: 75 CAPSULE ORAL at 08:59

## 2023-09-06 RX ADMIN — TUBERCULIN PURIFIED PROTEIN DERIVATIVE 5 UNITS: 5 INJECTION, SOLUTION INTRADERMAL at 13:57

## 2023-09-06 RX ADMIN — ACETAMINOPHEN 1000 MG: 500 TABLET, FILM COATED ORAL at 13:56

## 2023-09-06 RX ADMIN — HYDROCODONE BITARTRATE AND ACETAMINOPHEN 1 TABLET: 5; 325 TABLET ORAL at 02:16

## 2023-09-06 RX ADMIN — ACETAMINOPHEN 1000 MG: 500 TABLET, FILM COATED ORAL at 18:47

## 2023-09-06 RX ADMIN — OXYCODONE 5 MG: 5 TABLET ORAL at 20:57

## 2023-09-06 RX ADMIN — DABIGATRAN ETEXILATE MESYLATE 150 MG: 75 CAPSULE ORAL at 20:00

## 2023-09-06 RX ADMIN — IBUPROFEN 600 MG: 600 TABLET ORAL at 13:56

## 2023-09-06 RX ADMIN — SODIUM CHLORIDE, PRESERVATIVE FREE 10 ML: 5 INJECTION INTRAVENOUS at 09:00

## 2023-09-06 RX ADMIN — MORPHINE SULFATE 2 MG: 2 INJECTION, SOLUTION INTRAMUSCULAR; INTRAVENOUS at 08:58

## 2023-09-06 RX ADMIN — SODIUM CHLORIDE, PRESERVATIVE FREE 10 ML: 5 INJECTION INTRAVENOUS at 20:06

## 2023-09-06 RX ADMIN — ONDANSETRON 4 MG: 2 INJECTION INTRAMUSCULAR; INTRAVENOUS at 03:10

## 2023-09-06 RX ADMIN — OXYCODONE 5 MG: 5 TABLET ORAL at 17:16

## 2023-09-06 RX ADMIN — ASPIRIN 81 MG 81 MG: 81 TABLET ORAL at 08:59

## 2023-09-06 RX ADMIN — ACETAMINOPHEN 650 MG: 325 TABLET, FILM COATED ORAL at 03:10

## 2023-09-06 RX ADMIN — IBUPROFEN 600 MG: 600 TABLET ORAL at 17:16

## 2023-09-06 RX ADMIN — PANTOPRAZOLE SODIUM 40 MG: 40 TABLET, DELAYED RELEASE ORAL at 13:56

## 2023-09-06 ASSESSMENT — PAIN SCALES - GENERAL
PAINLEVEL_OUTOF10: 6
PAINLEVEL_OUTOF10: 6
PAINLEVEL_OUTOF10: 3
PAINLEVEL_OUTOF10: 10
PAINLEVEL_OUTOF10: 2
PAINLEVEL_OUTOF10: 3

## 2023-09-06 ASSESSMENT — PAIN DESCRIPTION - LOCATION
LOCATION: GENERALIZED;BACK;LEG
LOCATION: BACK
LOCATION: BACK

## 2023-09-06 ASSESSMENT — PAIN DESCRIPTION - ORIENTATION: ORIENTATION: RIGHT;LEFT

## 2023-09-06 ASSESSMENT — PAIN - FUNCTIONAL ASSESSMENT
PAIN_FUNCTIONAL_ASSESSMENT: PREVENTS OR INTERFERES SOME ACTIVE ACTIVITIES AND ADLS
PAIN_FUNCTIONAL_ASSESSMENT: PREVENTS OR INTERFERES SOME ACTIVE ACTIVITIES AND ADLS

## 2023-09-06 ASSESSMENT — PAIN DESCRIPTION - DESCRIPTORS
DESCRIPTORS: ACHING

## 2023-09-06 NOTE — DISCHARGE INSTRUCTIONS
Take medication as prescribed. Do not drive or operate heavy machinery while taking Norco as it can be sedating. Use caution with taking Norco as well as muscle relaxers. Follow-up with your primary care physician within 2 to 3 days. Return to the ER for worsening or worrisome symptoms. Below is interpretation from your CT scan. Radiologist recommended outpatient CT chest for further evaluation of a perihilar consolidation which could represent underlying mass. Give this information to your primary care physician who can order outpatient imaging if deemed necessary. Persistent left perihilar consolidation completely imaged. Given persistence    since February 2023 this does not represent a simple pneumonia. Recommend   dedicated contrast chest CT and/or pulmonary consultation when clinically   feasible.

## 2023-09-06 NOTE — PROGRESS NOTES
TRANSFER - IN REPORT:    Verbal report received from BRITTANEY Gonzalez on Karen Rail  being received from [facility] ED for routine progression of patient care      Report consisted of patient's Situation, Background, Assessment and   Recommendations(SBAR). Information from the following report(s) Nurse Handoff Report and ED SBAR was reviewed with the receiving nurse. Opportunity for questions and clarification was provided. Assessment completed upon patient's arrival to unit and care assumed.

## 2023-09-06 NOTE — PROGRESS NOTES
Occupational Therapy Note:    Attempted to see patient this AM for occupational therapy evaluation session. Patient declined oob with PT. Followed up this afternoon and spoke with patient and wife. Patient reported he did not have a fall. But did have a two day onset of Left lower back and leg pain 10/10. Prior to admit he was independent with adls and mobility. Will follow and re-attempt tomorrow.  Thank you,    Gabriel Hudson, OT    Rehab Caseload Tracker

## 2023-09-06 NOTE — CARE COORDINATION
09/06/23 1203   Service Assessment   Patient Orientation Alert and Oriented   Cognition Alert   History Provided By Patient   Primary Caregiver Self   Support Systems Spouse/Significant Other   Patient's Healthcare Decision Maker is: Legal Next of Kin   PCP Verified by CM Yes   Last Visit to PCP Within last 3 months   Prior Functional Level Assistance with the following:;Mobility   Current Functional Level Assistance with the following:   Can patient return to prior living arrangement Yes   Ability to make needs known: Good   Family able to assist with home care needs: Yes   Would you like for me to discuss the discharge plan with any other family members/significant others, and if so, who? No   Financial Resources Medicare   Community Resources None   Social/Functional History   Lives With Spouse   Type of [address] [place] [address]  One level   Home Access Stairs to enter without rails   Discharge Planning   Living Arrangements Spouse/Significant Other   Condition of Participation: Discharge [address] [facility] for Transition of Care is related to the following treatment goals: return home with spouse   The Patient and/or Patient Representative was provided with a Choice of Provider? Patient   The Patient and/Or Patient Representative agree with the Discharge Plan? Yes   Freedom of Choice list was provided with basic dialogue that supports the patient's individualized plan of care/goals, treatment preferences, and shares the quality data associated with the providers? Yes     Assessment completed with patient in room. Patient lives with spouse. patient reports he uses a rollator, cane and a rolling walker at home for mobility. Discharge plan at this tem is to return home with spouse.      Marcille Rubinstein LB, [address] [facility]

## 2023-09-06 NOTE — PLAN OF CARE
Problem: Discharge Planning  Goal: Discharge to home or other facility with appropriate resources  9/6/2023 1615 by Naun Yanez RN  Outcome: Progressing  9/6/2023 0251 by Tamera Malone RN  Outcome: Progressing     Problem: Pain  Goal: Verbalizes/displays adequate comfort level or baseline comfort level  9/6/2023 1615 by Naun Yanez RN  Outcome: Progressing  9/6/2023 0251 by Tamera Malone RN  Outcome: Progressing     Problem: Safety - Adult  Goal: Free from fall injury  Outcome: Progressing

## 2023-09-06 NOTE — ED NOTES
Called for bed [employer] supervisor to call back with a room     Reshma Meyer, [address]  09/05/23 4926

## 2023-09-06 NOTE — PROGRESS NOTES
Pt received contrast at 8:00pm 9/5. Will have to wait 24 hours post IV contrast injection before more contrast can be given.

## 2023-09-06 NOTE — PROGRESS NOTES
PT attempted to see pt for evaluation this am, after receiving IV dilaudid,  but after repeated attempts pt verbally & physically refused to  allow any movement due to reported uncontrolled / intolerable pain in left low back & hip region. Pt will not allow even a passive effort or assisted effort to sit EOB or even attempt to mobilize. MD & RN were informed pt will not allow any movement at this time.   Chayito De Luna, PT, EDDA

## 2023-09-06 NOTE — PROGRESS NOTES
Lymphocytes % 11 (L) 13 - 44 %    Monocytes % 10 4.0 - 12.0 %    Eosinophils % 1 0.5 - 7.8 %    Basophils % 0 0.0 - 2.0 %    Immature Granulocytes 1 0.0 - 5.0 %    Neutrophils Absolute 9.5 (H) 1.7 - 8.2 K/UL    Lymphocytes Absolute 1.4 0.5 - 4.6 K/UL    Monocytes Absolute 1.3 0.1 - 1.3 K/UL    Eosinophils Absolute 0.2 0.0 - 0.8 K/UL    Basophils Absolute 0.0 0.0 - 0.2 K/UL    Absolute Immature Granulocyte 0.1 0.0 - 0.5 K/UL   CMP    Collection Time: 09/05/23  6:44 PM   Result Value Ref Range    Sodium 139 133 - 143 mmol/L    Potassium 4.2 3.5 - 5.1 mmol/L    Chloride 107 101 - 110 mmol/L    CO2 30 21 - 32 mmol/L    Anion Gap 2 2 - 11 mmol/L    Glucose 104 (H) 65 - 100 mg/dL    BUN 21 8 - 23 MG/DL    Creatinine 0.94 0.8 - 1.5 MG/DL    Est, Glom Filt Rate >60 >60 ml/min/1.73m2    Calcium 9.2 8.3 - 10.4 MG/DL    Total Bilirubin 1.2 (H) 0.2 - 1.1 MG/DL    ALT 28 12 - 65 U/L    AST 16 15 - 37 U/L    Alk Phosphatase 70 50 - 136 U/L    Total Protein 7.5 6.3 - 8.2 g/dL    Albumin 3.4 3.2 - 4.6 g/dL    Globulin 4.1 2.8 - 4.5 g/dL    Albumin/Globulin Ratio 0.8 0.4 - 1.6         Current Meds:  Current Facility-Administered Medications   Medication Dose Route Frequency    tuberculin injection 5 Units  5 Units IntraDERmal Once    ibuprofen (ADVIL;MOTRIN) tablet 600 mg  600 mg Oral TID WC    pantoprazole (PROTONIX) tablet 40 mg  40 mg Oral QAM AC    acetaminophen (TYLENOL) tablet 1,000 mg  1,000 mg Oral 4 times per day    oxyCODONE (ROXICODONE) immediate release tablet 5 mg  5 mg Oral Q4H PRN    sodium chloride flush 0.9 % injection 5-40 mL  5-40 mL IntraVENous 2 times per day    sodium chloride flush 0.9 % injection 5-40 mL  5-40 mL IntraVENous PRN    0.9 % sodium chloride infusion   IntraVENous PRN    ondansetron (ZOFRAN-ODT) disintegrating tablet 4 mg  4 mg Oral Q8H PRN    Or    ondansetron (ZOFRAN) injection 4 mg  4 mg IntraVENous Q6H PRN    polyethylene glycol (GLYCOLAX) packet 17 g  17 g Oral Daily PRN    aluminum & magnesium hydroxide-simethicone (MAALOX) 200-200-20 MG/5ML suspension 30 mL  30 mL Oral Q6H PRN    aspirin chewable tablet 81 mg  81 mg Oral Daily    dabigatran (PRADAXA) capsule 150 mg  150 mg Oral BID       Signed:  Alma Mckeon MD    Part of this note may have been written by using a voice dictation software. The note has been proof read but may still contain some grammatical/other typographical errors.

## 2023-09-06 NOTE — ED NOTES
TRANSFER - OUT REPORT:    Verbal report given to [facility] on Aleksandr Geno  being transferred to   for routine progression of patient care       Report consisted of patient's Situation, Background, Assessment and   Recommendations(SBAR). Information from the following report(s) ED SBAR was reviewed with the receiving nurse. Lines:   Peripheral IV 09/05/23 Posterior;Right Forearm (Active)        Opportunity for questions and clarification was provided.       Patient transported with:  Registered Nurse       Alden Walter RN  09/05/23 6958

## 2023-09-06 NOTE — ED NOTES
[facility]       Jeanne Read RN  09/05/23 3929

## 2023-09-06 NOTE — H&P
Hospitalist History and Physical   Admit Date:  2023  4:02 PM   Name:  Antoinette Melendez   Age:  81 y.o. Sex:  male  :  1935   MRN:  [id number]   Room:  03/    Presenting Complaint: Back Pain     Reason(s) for Admission: Other acute back pain [M54.9]       Assessment & Plan:     Principal Problem:    Acute low back pain -81year-old gentleman with history of hypertension osteoarthritis, here for acute low back pain started 1 day prior to admission. Radiation down to mid thigh. Patient is requiring IV pain medication and he has had trouble walking. Question sciatica. No acute findings on CT scan. Wife unable to lift or care for patient in present state. Active Problems:    Difficulty in walking      Essential hypertension      HLD (hyperlipidemia)      Other acute back pain    Plan:   Admit to medical bed, observation status  IV pain control  Fall precautions  PT and OT consult in a.m. Resume home medications  Case management consult for discharge planning and home health      Anticipated discharge needs:     NONE    Diet: regular  VTE ppx: pradaxa  Code status: FULL      History of Present Illness:   Antoinette Melendez is a 81 y.o. male with medical history of OA, HTN, h/o PE on Pradaxa presents to the emergency department with family at bedside. Patient complains of severe left-sided low back pain as well as left midline back pain with pain rating down his left lower extremity that began mildly last night, more severe today. No abdominal pain. No nausea, vomiting or diarrhea. No history of back injuries. Was unable to ambulate today secondary to the severity of the pain. Family attempted to help him ambulate to the vehicle but he took 2 steps and then nearly collapsed. EMS was called at that time. Denies any trauma or recent overuse. Denies fever, chills, numbness or weakness in lower extremities. No saddle anesthesia. No urinary or fecal retention or incontinence.

## 2023-09-07 ENCOUNTER — APPOINTMENT (OUTPATIENT)
Dept: MRI IMAGING | Age: 88
DRG: 552 | End: 2023-09-07
Payer: MEDICARE

## 2023-09-07 ENCOUNTER — APPOINTMENT (OUTPATIENT)
Dept: CT IMAGING | Age: 88
DRG: 552 | End: 2023-09-07
Payer: MEDICARE

## 2023-09-07 PROBLEM — D64.9 ANEMIA: Status: ACTIVE | Noted: 2023-09-07

## 2023-09-07 LAB
ANION GAP SERPL CALC-SCNC: 4 MMOL/L (ref 2–11)
BUN SERPL-MCNC: 35 MG/DL (ref 8–23)
CALCIUM SERPL-MCNC: 8.6 MG/DL (ref 8.3–10.4)
CHLORIDE SERPL-SCNC: 108 MMOL/L (ref 101–110)
CO2 SERPL-SCNC: 28 MMOL/L (ref 21–32)
CREAT SERPL-MCNC: 1.15 MG/DL (ref 0.8–1.5)
ERYTHROCYTE [DISTWIDTH] IN BLOOD BY AUTOMATED COUNT: 13.9 % (ref 11.9–14.6)
GLUCOSE SERPL-MCNC: 110 MG/DL (ref 65–100)
HCT VFR BLD AUTO: 33.5 % (ref 41.1–50.3)
HGB BLD-MCNC: 10.6 G/DL (ref 13.6–17.2)
MCH RBC QN AUTO: 32.1 PG (ref 26.1–32.9)
MCHC RBC AUTO-ENTMCNC: 31.6 G/DL (ref 31.4–35)
MCV RBC AUTO: 101.5 FL (ref 82–102)
MM INDURATION, POC: 0 MM (ref 0–5)
NRBC # BLD: 0 K/UL (ref 0–0.2)
PLATELET # BLD AUTO: 161 K/UL (ref 150–450)
PMV BLD AUTO: 9.5 FL (ref 9.4–12.3)
POTASSIUM SERPL-SCNC: 4.1 MMOL/L (ref 3.5–5.1)
PPD, POC: NEGATIVE
RBC # BLD AUTO: 3.3 M/UL (ref 4.23–5.6)
SODIUM SERPL-SCNC: 140 MMOL/L (ref 133–143)
WBC # BLD AUTO: 9.5 K/UL (ref 4.3–11.1)

## 2023-09-07 PROCEDURE — 6360000004 HC RX CONTRAST MEDICATION: Performed by: INTERNAL MEDICINE

## 2023-09-07 PROCEDURE — 72148 MRI LUMBAR SPINE W/O DYE: CPT

## 2023-09-07 PROCEDURE — 80048 BASIC METABOLIC PNL TOTAL CA: CPT

## 2023-09-07 PROCEDURE — 71260 CT THORAX DX C+: CPT

## 2023-09-07 PROCEDURE — 99222 1ST HOSP IP/OBS MODERATE 55: CPT | Performed by: PHYSICIAN ASSISTANT

## 2023-09-07 PROCEDURE — 2580000003 HC RX 258: Performed by: HOSPITALIST

## 2023-09-07 PROCEDURE — 6360000002 HC RX W HCPCS: Performed by: PHYSICIAN ASSISTANT

## 2023-09-07 PROCEDURE — 85027 COMPLETE CBC AUTOMATED: CPT

## 2023-09-07 PROCEDURE — 6370000000 HC RX 637 (ALT 250 FOR IP): Performed by: HOSPITALIST

## 2023-09-07 PROCEDURE — 36415 COLL VENOUS BLD VENIPUNCTURE: CPT

## 2023-09-07 PROCEDURE — 1100000000 HC RM PRIVATE

## 2023-09-07 PROCEDURE — 6370000000 HC RX 637 (ALT 250 FOR IP): Performed by: INTERNAL MEDICINE

## 2023-09-07 RX ORDER — OXYCODONE HYDROCHLORIDE 5 MG/1
5 TABLET ORAL
Status: COMPLETED | OUTPATIENT
Start: 2023-09-07 | End: 2023-09-07

## 2023-09-07 RX ORDER — DEXAMETHASONE SODIUM PHOSPHATE 10 MG/ML
8 INJECTION INTRAMUSCULAR; INTRAVENOUS ONCE
Status: COMPLETED | OUTPATIENT
Start: 2023-09-07 | End: 2023-09-07

## 2023-09-07 RX ORDER — DEXAMETHASONE SODIUM PHOSPHATE 4 MG/ML
4 INJECTION, SOLUTION INTRA-ARTICULAR; INTRALESIONAL; INTRAMUSCULAR; INTRAVENOUS; SOFT TISSUE EVERY 6 HOURS
Status: COMPLETED | OUTPATIENT
Start: 2023-09-07 | End: 2023-09-08

## 2023-09-07 RX ADMIN — IBUPROFEN 600 MG: 600 TABLET ORAL at 18:01

## 2023-09-07 RX ADMIN — OXYCODONE 5 MG: 5 TABLET ORAL at 21:12

## 2023-09-07 RX ADMIN — SODIUM CHLORIDE, PRESERVATIVE FREE 10 ML: 5 INJECTION INTRAVENOUS at 09:57

## 2023-09-07 RX ADMIN — IOPAMIDOL 100 ML: 755 INJECTION, SOLUTION INTRAVENOUS at 08:29

## 2023-09-07 RX ADMIN — DEXAMETHASONE SODIUM PHOSPHATE 4 MG: 4 INJECTION, SOLUTION INTRAMUSCULAR; INTRAVENOUS at 18:01

## 2023-09-07 RX ADMIN — DABIGATRAN ETEXILATE MESYLATE 150 MG: 75 CAPSULE ORAL at 09:56

## 2023-09-07 RX ADMIN — IBUPROFEN 600 MG: 600 TABLET ORAL at 14:07

## 2023-09-07 RX ADMIN — OXYCODONE 5 MG: 5 TABLET ORAL at 00:58

## 2023-09-07 RX ADMIN — ACETAMINOPHEN 1000 MG: 500 TABLET, FILM COATED ORAL at 05:04

## 2023-09-07 RX ADMIN — IBUPROFEN 600 MG: 600 TABLET ORAL at 09:56

## 2023-09-07 RX ADMIN — ASPIRIN 81 MG 81 MG: 81 TABLET ORAL at 09:56

## 2023-09-07 RX ADMIN — OXYCODONE HYDROCHLORIDE 5 MG: 5 TABLET ORAL at 12:19

## 2023-09-07 RX ADMIN — DEXAMETHASONE SODIUM PHOSPHATE 4 MG: 4 INJECTION, SOLUTION INTRAMUSCULAR; INTRAVENOUS at 21:13

## 2023-09-07 RX ADMIN — DABIGATRAN ETEXILATE MESYLATE 150 MG: 75 CAPSULE ORAL at 21:12

## 2023-09-07 RX ADMIN — DEXAMETHASONE SODIUM PHOSPHATE 8 MG: 10 INJECTION INTRAMUSCULAR; INTRAVENOUS at 10:02

## 2023-09-07 RX ADMIN — ACETAMINOPHEN 1000 MG: 500 TABLET, FILM COATED ORAL at 19:14

## 2023-09-07 RX ADMIN — PANTOPRAZOLE SODIUM 40 MG: 40 TABLET, DELAYED RELEASE ORAL at 05:04

## 2023-09-07 RX ADMIN — ACETAMINOPHEN 1000 MG: 500 TABLET, FILM COATED ORAL at 14:07

## 2023-09-07 RX ADMIN — OXYCODONE 5 MG: 5 TABLET ORAL at 05:03

## 2023-09-07 RX ADMIN — ACETAMINOPHEN 1000 MG: 500 TABLET, FILM COATED ORAL at 00:14

## 2023-09-07 RX ADMIN — SODIUM CHLORIDE, PRESERVATIVE FREE 10 ML: 5 INJECTION INTRAVENOUS at 21:00

## 2023-09-07 ASSESSMENT — PAIN SCALES - GENERAL
PAINLEVEL_OUTOF10: 3
PAINLEVEL_OUTOF10: 6
PAINLEVEL_OUTOF10: 3
PAINLEVEL_OUTOF10: 6

## 2023-09-07 ASSESSMENT — PAIN DESCRIPTION - DESCRIPTORS
DESCRIPTORS: ACHING;TENDER
DESCRIPTORS: ACHING;DISCOMFORT

## 2023-09-07 ASSESSMENT — PAIN DESCRIPTION - LOCATION
LOCATION: BACK;LEG
LOCATION: BACK;LEG

## 2023-09-07 ASSESSMENT — PAIN DESCRIPTION - ORIENTATION
ORIENTATION: RIGHT;LEFT
ORIENTATION: RIGHT;LEFT

## 2023-09-07 NOTE — PLAN OF CARE
Problem: Discharge Planning  Goal: Discharge to home or other facility with appropriate resources  9/6/2023 2137 by Nicky Bernal RN  Outcome: Progressing  9/6/2023 2137 by Nicky Bernal RN  Outcome: Progressing  9/6/2023 1615 by Truong Cat RN  Outcome: Progressing     Problem: Pain  Goal: Verbalizes/displays adequate comfort level or baseline comfort level  9/6/2023 2137 by Nicky Bernal RN  Outcome: Progressing  9/6/2023 1615 by Truong Cat RN  Outcome: Progressing     Problem: Safety - Adult  Goal: Free from fall injury  9/6/2023 2137 by Nicky Bernal RN  Outcome: Progressing  9/6/2023 1615 by Truong Cat RN  Outcome: Progressing

## 2023-09-07 NOTE — PROGRESS NOTES
g/dL    RDW 13.8 11.9 - 14.6 %    Platelets 021 293 - 633 K/uL    MPV 9.2 (L) 9.4 - 12.3 FL    nRBC 0.00 0.0 - 0.2 K/uL    Differential Type AUTOMATED      Neutrophils % 77 43 - 78 %    Lymphocytes % 11 (L) 13 - 44 %    Monocytes % 10 4.0 - 12.0 %    Eosinophils % 1 0.5 - 7.8 %    Basophils % 0 0.0 - 2.0 %    Immature Granulocytes 1 0.0 - 5.0 %    Neutrophils Absolute 9.5 (H) 1.7 - 8.2 K/UL    Lymphocytes Absolute 1.4 0.5 - 4.6 K/UL    Monocytes Absolute 1.3 0.1 - 1.3 K/UL    Eosinophils Absolute 0.2 0.0 - 0.8 K/UL    Basophils Absolute 0.0 0.0 - 0.2 K/UL    Absolute Immature Granulocyte 0.1 0.0 - 0.5 K/UL   CMP    Collection Time: 09/05/23  6:44 PM   Result Value Ref Range    Sodium 139 133 - 143 mmol/L    Potassium 4.2 3.5 - 5.1 mmol/L    Chloride 107 101 - 110 mmol/L    CO2 30 21 - 32 mmol/L    Anion Gap 2 2 - 11 mmol/L    Glucose 104 (H) 65 - 100 mg/dL    BUN 21 8 - 23 MG/DL    Creatinine 0.94 0.8 - 1.5 MG/DL    Est, Glom Filt Rate >60 >60 ml/min/1.73m2    Calcium 9.2 8.3 - 10.4 MG/DL    Total Bilirubin 1.2 (H) 0.2 - 1.1 MG/DL    ALT 28 12 - 65 U/L    AST 16 15 - 37 U/L    Alk Phosphatase 70 50 - 136 U/L    Total Protein 7.5 6.3 - 8.2 g/dL    Albumin 3.4 3.2 - 4.6 g/dL    Globulin 4.1 2.8 - 4.5 g/dL    Albumin/Globulin Ratio 0.8 0.4 - 1.6     CBC    Collection Time: 09/06/23 12:05 PM   Result Value Ref Range    WBC 10.6 4.3 - 11.1 K/uL    RBC 3.77 (L) 4.23 - 5.6 M/uL    Hemoglobin 12.3 (L) 13.6 - 17.2 g/dL    Hematocrit 38.1 (L) 41.1 - 50.3 %    .1 82.0 - 102.0 FL    MCH 32.6 26.1 - 32.9 PG    MCHC 32.3 31.4 - 35.0 g/dL    RDW 13.9 11.9 - 14.6 %    Platelets 339 514 - 171 K/uL    MPV 9.2 (L) 9.4 - 12.3 FL    nRBC 0.00 0.0 - 0.2 K/uL   Basic Metabolic Panel w/ Reflex to MG    Collection Time: 09/06/23  2:23 PM   Result Value Ref Range    Sodium 140 133 - 143 mmol/L    Potassium 3.9 3.5 - 5.1 mmol/L    Chloride 107 101 - 110 mmol/L    CO2 31 21 - 32 mmol/L    Anion Gap 2 2 - 11 mmol/L    Glucose 96 65 - 100 pantoprazole (PROTONIX) tablet 40 mg  40 mg Oral QAM AC    acetaminophen (TYLENOL) tablet 1,000 mg  1,000 mg Oral 4 times per day    oxyCODONE (ROXICODONE) immediate release tablet 5 mg  5 mg Oral Q4H PRN    sodium chloride flush 0.9 % injection 5-40 mL  5-40 mL IntraVENous 2 times per day    sodium chloride flush 0.9 % injection 5-40 mL  5-40 mL IntraVENous PRN    0.9 % sodium chloride infusion   IntraVENous PRN    ondansetron (ZOFRAN-ODT) disintegrating tablet 4 mg  4 mg Oral Q8H PRN    Or    ondansetron (ZOFRAN) injection 4 mg  4 mg IntraVENous Q6H PRN    polyethylene glycol (GLYCOLAX) packet 17 g  17 g Oral Daily PRN    aluminum & magnesium hydroxide-simethicone (MAALOX) 200-200-20 MG/5ML suspension 30 mL  30 mL Oral Q6H PRN    aspirin chewable tablet 81 mg  81 mg Oral Daily    dabigatran (PRADAXA) capsule 150 mg  150 mg Oral BID       Signed:  Terri Trent MD    Part of this note may have been written by using a voice dictation software. The note has been proof read but may still contain some grammatical/other typographical errors.

## 2023-09-07 NOTE — CONSULTS
[facility]  Consultation Note    Patient ID:  Name: Anette Andrea  MRN: [id number]  AGE: 81 y.o.  : 1935    Date of Consultation:  2023  Referring Physician:  Theresa Leblanc MD    Subjective: Pt complains of left lower back pain into left buttock and posterior leg. It was severe and he was brought to ED. CT obtained without acute findings. He was admitted for irretractable pain and difficulty walking due to pain. Today his pain is much better on Percocet and he has had decadron. MRI is ordered and scheduled for this evening. Pertinent positive history is blood thinner, Pradaxa, for blood clot history. Unsure if he would be able to hold blood thinners for interventional procedures. Past Medical History Includes:   Past Medical History:   Diagnosis Date    Hypercholesterolemia     Hypertension     Vertigo     Vertigo   ,   Past Surgical History:   Procedure Laterality Date    APPENDECTOMY      GI  1964    Colon resection    NEUROLOGICAL SURGERY      neck     Family History:   Family History   Problem Relation Age of Onset    Hypertension Father     Hypertension Mother     Osteoarthritis Mother     Stroke Father     Lupus Mother       Social History:   Social History     Tobacco Use    Smoking status: Former     Types: Cigarettes     Passive exposure: Past    Smokeless tobacco: Never   Substance Use Topics    Alcohol use: No       ALLERGIES:   Allergies   Allergen Reactions    Amlodipine Other (See Comments)     Other reaction(s):  Other- (not listed) - Allergy  Shot bp up      Atorvastatin Other (See Comments)    Bupropion Other (See Comments)    Clonidine Other (See Comments)    Doxazosin Other (See Comments)    Hydrochlorothiazide Other (See Comments)    Lisinopril Other (See Comments)    Metoprolol Other (See Comments)    Simvastatin Other (See Comments)        Patient Medications    Current Facility-Administered Medications   Medication Dose

## 2023-09-07 NOTE — PROGRESS NOTES
Occupational and  Physical therapy Note:      Orders received. Ortho consult complete. Mri to be completed. Will check back to complete OT /PT evaluations pending MRI results. Thank you for this referral.    [phone] with patient and wife. He is feeling much better. Pain relief is much better. He is getting up in the room by himself with the RW and reported he took a shower by himself today. OT Answered all questions. He plans to discharge home with his good support from his wife. He has a rollator, RW and a cane at home. Will see him tomorrow for full Evaluation pending Mri results. Patient and family in agreement with this poc. Nursing was present in room to answer questions as well.      Stew Camacho OT    Rehab Caseload Tracker

## 2023-09-07 NOTE — PROGRESS NOTES
Ortho Spine Note    Chart reviewed and images reviewed. Patient with acute low back pain with lumbar radiculopathy. CT scan without acute findings. Appears to likely be severe stenosis at multiple levels but this will best be visualized with an MRI scan. T 12 has endplate degeneration with a Schmorl's node that was present on prior images so I do not suspect this an acute fracture. Review of chart suggests neurovascular intact with irretractable pain. Awaiting MRI lumbar spine to deleniate anatomy and severity. Recommend adding decadron. We will review MRI once completed. Unless cauda equina syndrome is suspected, would not recommend transferring to DT for surgery. This can be managed outpatient. He may need to be discharged to SNF. Possible to consider ERIC once MRI is done but he would have to be off Pradaxa 72 hours for an ERIC. I will see him later today.      Halley Gallegos PA-C

## 2023-09-08 ENCOUNTER — HOME HEALTH ADMISSION (OUTPATIENT)
Dept: HOME HEALTH SERVICES | Facility: HOME HEALTH | Age: 88
End: 2023-09-08

## 2023-09-08 VITALS
RESPIRATION RATE: 18 BRPM | HEART RATE: 100 BPM | WEIGHT: 180 LBS | TEMPERATURE: 97.6 F | SYSTOLIC BLOOD PRESSURE: 102 MMHG | OXYGEN SATURATION: 91 % | BODY MASS INDEX: 28.93 KG/M2 | DIASTOLIC BLOOD PRESSURE: 80 MMHG | HEIGHT: 66 IN

## 2023-09-08 PROBLEM — K21.9 GERD (GASTROESOPHAGEAL REFLUX DISEASE): Chronic | Status: ACTIVE | Noted: 2020-05-12

## 2023-09-08 PROBLEM — C34.12 MALIGNANT NEOPLASM OF UPPER LOBE OF LEFT LUNG (HCC): Chronic | Status: ACTIVE | Noted: 2022-04-13

## 2023-09-08 LAB
FERRITIN SERPL-MCNC: 156 NG/ML (ref 8–388)
FOLATE SERPL-MCNC: 11.1 NG/ML (ref 3.1–17.5)
HGB BLD-MCNC: 11.3 G/DL (ref 13.6–17.2)
IRON SATN MFR SERPL: 12 %
IRON SERPL-MCNC: 33 UG/DL (ref 35–150)
TIBC SERPL-MCNC: 278 UG/DL (ref 250–450)
VIT B12 SERPL-MCNC: 467 PG/ML (ref 193–986)

## 2023-09-08 PROCEDURE — 97165 OT EVAL LOW COMPLEX 30 MIN: CPT

## 2023-09-08 PROCEDURE — 82607 VITAMIN B-12: CPT

## 2023-09-08 PROCEDURE — 83540 ASSAY OF IRON: CPT

## 2023-09-08 PROCEDURE — 85018 HEMOGLOBIN: CPT

## 2023-09-08 PROCEDURE — 6360000002 HC RX W HCPCS: Performed by: PHYSICIAN ASSISTANT

## 2023-09-08 PROCEDURE — 97530 THERAPEUTIC ACTIVITIES: CPT

## 2023-09-08 PROCEDURE — 97535 SELF CARE MNGMENT TRAINING: CPT

## 2023-09-08 PROCEDURE — 6370000000 HC RX 637 (ALT 250 FOR IP): Performed by: HOSPITALIST

## 2023-09-08 PROCEDURE — 6370000000 HC RX 637 (ALT 250 FOR IP): Performed by: INTERNAL MEDICINE

## 2023-09-08 PROCEDURE — 97161 PT EVAL LOW COMPLEX 20 MIN: CPT

## 2023-09-08 PROCEDURE — 82728 ASSAY OF FERRITIN: CPT

## 2023-09-08 PROCEDURE — 36415 COLL VENOUS BLD VENIPUNCTURE: CPT

## 2023-09-08 PROCEDURE — 83550 IRON BINDING TEST: CPT

## 2023-09-08 PROCEDURE — 2580000003 HC RX 258: Performed by: HOSPITALIST

## 2023-09-08 PROCEDURE — 82746 ASSAY OF FOLIC ACID SERUM: CPT

## 2023-09-08 RX ORDER — OXYCODONE HYDROCHLORIDE AND ACETAMINOPHEN 5; 325 MG/1; MG/1
1 TABLET ORAL EVERY 6 HOURS PRN
Qty: 60 TABLET | Refills: 0 | Status: SHIPPED | OUTPATIENT
Start: 2023-09-08 | End: 2023-09-23

## 2023-09-08 RX ORDER — PREDNISONE 10 MG/1
TABLET ORAL
Qty: 14 TABLET | Refills: 0 | Status: SHIPPED | OUTPATIENT
Start: 2023-09-08

## 2023-09-08 RX ORDER — PREDNISONE 20 MG/1
40 TABLET ORAL DAILY
Status: DISCONTINUED | OUTPATIENT
Start: 2023-09-08 | End: 2023-09-08 | Stop reason: HOSPADM

## 2023-09-08 RX ORDER — LIDOCAINE 50 MG/G
1 PATCH TOPICAL DAILY PRN
Qty: 10 PATCH | Refills: 0 | Status: SHIPPED | OUTPATIENT
Start: 2023-09-08 | End: 2023-09-18

## 2023-09-08 RX ORDER — TIZANIDINE 2 MG/1
2 TABLET ORAL EVERY 6 HOURS PRN
Qty: 20 TABLET | Refills: 0 | Status: SHIPPED | OUTPATIENT
Start: 2023-09-08

## 2023-09-08 RX ADMIN — DABIGATRAN ETEXILATE MESYLATE 150 MG: 75 CAPSULE ORAL at 08:32

## 2023-09-08 RX ADMIN — IBUPROFEN 600 MG: 600 TABLET ORAL at 08:32

## 2023-09-08 RX ADMIN — ACETAMINOPHEN 1000 MG: 500 TABLET, FILM COATED ORAL at 01:25

## 2023-09-08 RX ADMIN — OXYCODONE 5 MG: 5 TABLET ORAL at 05:34

## 2023-09-08 RX ADMIN — SODIUM CHLORIDE, PRESERVATIVE FREE 10 ML: 5 INJECTION INTRAVENOUS at 08:32

## 2023-09-08 RX ADMIN — ASPIRIN 81 MG 81 MG: 81 TABLET ORAL at 08:32

## 2023-09-08 RX ADMIN — OXYCODONE 5 MG: 5 TABLET ORAL at 10:43

## 2023-09-08 RX ADMIN — ACETAMINOPHEN 1000 MG: 500 TABLET, FILM COATED ORAL at 05:34

## 2023-09-08 RX ADMIN — OXYCODONE 5 MG: 5 TABLET ORAL at 01:25

## 2023-09-08 RX ADMIN — PANTOPRAZOLE SODIUM 40 MG: 40 TABLET, DELAYED RELEASE ORAL at 05:34

## 2023-09-08 RX ADMIN — DEXAMETHASONE SODIUM PHOSPHATE 4 MG: 4 INJECTION, SOLUTION INTRAMUSCULAR; INTRAVENOUS at 05:35

## 2023-09-08 ASSESSMENT — PAIN SCALES - GENERAL
PAINLEVEL_OUTOF10: 5
PAINLEVEL_OUTOF10: 6

## 2023-09-08 ASSESSMENT — PAIN DESCRIPTION - LOCATION
LOCATION: BACK
LOCATION: BACK

## 2023-09-08 ASSESSMENT — PAIN DESCRIPTION - ORIENTATION
ORIENTATION: LOWER
ORIENTATION: LOWER

## 2023-09-08 NOTE — DISCHARGE SUMMARY
Lab Results   Component Value Date/Time    CHOL 221 08/13/2023 01:05 PM    LDLCALC 140.8 08/13/2023 01:05 PM    LABVLDL 34.2 08/13/2023 01:05 PM    HDL 46 08/13/2023 01:05 PM    CHOLHDLRATIO 4.8 08/13/2023 01:05 PM    TRIG 171 08/13/2023 01:05 PM      Thyroid  Lab Results   Component Value Date/Time    TSHELE 2.09 08/13/2023 01:05 PM    QGU8VEM 2.750 02/13/2023 03:08 PM        Most Recent UA Lab Results   Component Value Date/Time    COLORU YELLOW/STRAW 09/06/2023 03:13 PM    APPEARANCE CLEAR 09/06/2023 03:13 PM    SPECGRAV 1.017 09/06/2023 03:13 PM    LABPH 5.5 09/06/2023 03:13 PM    PROTEINU TRACE 09/06/2023 03:13 PM    GLUCOSEU Negative 09/06/2023 03:13 PM    KETUA Negative 09/06/2023 03:13 PM    BILIRUBINUR Negative 09/06/2023 03:13 PM    BLOODU Negative 09/06/2023 03:13 PM    UROBILINOGEN 0.2 09/06/2023 03:13 PM    NITRU Negative 09/06/2023 03:13 PM    LEUKOCYTESUR Negative 09/06/2023 03:13 PM    WBCUA 0-4 09/06/2023 03:13 PM    RBCUA 0-5 09/06/2023 03:13 PM    EPITHUA 0-5 09/06/2023 03:13 PM    BACTERIA Negative 09/06/2023 03:13 PM    LABCAST 0-2 09/06/2023 03:13 PM    MUCUS 0 09/06/2023 03:13 PM        Microbiology:  Results       ** No results found for the last 336 hours. **            All Labs from Last 24 Hrs:  Recent Results (from the past 24 hour(s))   Hemoglobin    Collection Time: 09/08/23  5:46 AM   Result Value Ref Range    Hemoglobin 11.3 (L) 13.6 - 17.2 g/dL   Transferrin Saturation    Collection Time: 09/08/23  5:46 AM   Result Value Ref Range    Iron 33 (L) 35 - 150 ug/dL    TIBC 278 250 - 450 ug/dL    TRANSFERRIN SATURATION 12 %   Ferritin    Collection Time: 09/08/23  5:46 AM   Result Value Ref Range    Ferritin 156 8 - 388 NG/ML       Allergies   Allergen Reactions    Amlodipine Other (See Comments)     Other reaction(s):  Other- (not listed) - Allergy  Shot bp up      Atorvastatin Other (See Comments)    Bupropion Other (See Comments)    Clonidine Other (See Comments)    Doxazosin Other (See

## 2023-09-08 NOTE — PROGRESS NOTES
patient.)  Therapeutic Activity  Therapeutic Exercise/HEP  Gait Training       TREATMENT:   EVALUATION: LOW COMPLEXITY: (Untimed Charge)    TREATMENT:   Therapeutic Activity (20 Minutes): Therapeutic activity included Scooting, Transfer Training, Ambulation on level ground, Stair Training, Sitting balance , and Standing balance to improve functional Activity tolerance, Balance, Coordination, Mobility, Strength, and ROM. TREATMENT GRID:  N/A    AFTER TREATMENT PRECAUTIONS: Bed/Chair Locked, Call light within reach, Chair, Needs within reach, RN notified, and Visitors at bedside    INTERDISCIPLINARY COLLABORATION:  RN/ PCT, PT/ PTA, and OT/ FLOYD    EDUCATION: Education Given To: Patient; Family  Education Provided: Role of Therapy;Plan of Care;Precautions;Transfer Training; Fall Prevention Strategies  Education Method: Demonstration;Verbal  Education Outcome: Verbalized understanding;Demonstrated understanding    TIME IN/OUT:  Time In: 1115  Time Out: [address]  Minutes: [address], PT

## 2023-09-08 NOTE — CARE COORDINATION
Patient has discharge orders in on this day. CM spoke to patient at bedside to inform him discharge recommendation is home with home health. Patient is in agreement. CM gave patient home care choice list. Patient chose [facility]. CM sent referral accordingly. No CM needs voiced or noted at this time.      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)    Nontraumatic tear of left rotator cuff    Acute hypoxemic respiratory failure (HCC)    HLD (hyperlipidemia)    PNA (pneumonia)    Acute pulmonary embolism, unspecified pulmonary embolism type, unspecified whether acute cor pulmonale present (HCC)    Hypoxia    Diarrhea    Intractable vomiting with nausea    Lung mass    Pancytopenia ([address])    Pneumonia due to COVID-19 virus    Acute cystitis with hematuria    Acute prostatitis    Prostatitis    Soft tissue mass    Vertigo    Stroke-like symptoms    History of pulmonary embolism    Pre-diabetes    Spinal stenosis of lumbar region with neurogenic claudication    Difficulty in walking    Degenerative disc disease, lumbar    Anemia       Service Assessment  Patient Orientation Alert and Oriented   Cognition Alert   History Provided By Patient   Primary Caregiver Self   Accompanied By/Relationship     Support Systems Spouse/Significant Other   Patient's Healthcare Decision Maker is: Legal Next of Kin   PCP Verified by CM Yes   Last Visit to PCP Within last 3 months   Prior

## 2023-09-08 NOTE — PROGRESS NOTES
Ortho spine      I have independently reviewed The MRI scan of the lumbar spine. The read is not yet available. Mild stenosis L2-3, mild to moderate lateral recess stenosis L3-4, severe spinal stenosis L4-5 little more right sided lateral recess stenosis due to bulky facet arthropathy and ligamentum thickening and mild stenosis L5-S1 there is a little more of a left-sided disc protrusion with some slight abutment on S1 nerve root. Patient is being discharged home. Recommendation is to follow-up with orthopedic spine in outpatient setting. Surgery would likely involve multilevel lumbar laminectomy and patient would have to be able to hold blood thinning medications for either surgical intervention or interventional injections. If this is not possible, he would best be managed in pain management setting.     Jordan Gerardo PA-C

## 2023-09-08 NOTE — PROGRESS NOTES
ACUTE OCCUPATIONAL THERAPY GOALS:   (Developed with and agreed upon by patient and/or caregiver.)  1. Patient will perform grooming with supervision. 2. Patient will perform upper body dressing with supervision. 3. Patient will perform lower body dressing with SBA. 4. Patient will perform bathing with SBA. 5. Patient will perform toileting and toilet transfer with SBA. 6. Patient will perform ADL functional mobility and tranfers in room with SBA. 7. Patient/family to demonstrate knowledge of home safety and DME recommendations. Goals to be achieved in 7 days. OCCUPATIONAL THERAPY Initial Assessment and AM          Acknowledge Orders  Time  OT Charge Capture  Rehab Caseload Tracker      Arianna Rodriguez is a 81 y.o. male   PRIMARY DIAGNOSIS: Spinal stenosis of lumbar region with neurogenic claudication  DDD (degenerative disc disease), cervical [M50.30]  Back strain, initial encounter [H94.870D]  Other acute back pain [M54.9]  Spinal stenosis of lumbar region with neurogenic claudication [M48.062]       Reason for Referral: Pain in left hip (M25.552)  Pain in Left Knee (M25.562)  Stiffness of Left Knee, Not elsewhere classified (M25.662)  Generalized Muscle Weakness (M62.81)  Other lack of cordination (R27.8)  Difficulty in walking, Not elsewhere classified (R26.2)  Other abnormalities of gait and mobility (R26.89)  History of falling (Z91.81)  Inpatient: Payor: MEDICARE / Plan: MEDICARE PART A AND B / Product Type: *No Product type* /     ASSESSMENT:     REHAB RECOMMENDATIONS:   Recommendation to date pending progress:  Settin [address] OT and   PT    Equipment:    None     ASSESSMENT:  Mr. Martinez Dom sitting in [facility] with above diagnosis. His MRI was completed. He ambulated in the hallway with RW and rollator with Cga. He performed a step with PT and returned to his room. His wife was present. Frankie Kervinrosemarei reported that he took a shower this am by himself without difficulty.  He

## 2023-09-11 ENCOUNTER — TELEPHONE (OUTPATIENT)
Dept: INTERNAL MEDICINE CLINIC | Facility: CLINIC | Age: 88
End: 2023-09-11

## 2023-09-11 NOTE — CARE COORDINATION
CM was informed [facility] unable to see patient for home care. CM spoke to patient's spouse, Vicente Rust, [phone], who agreed for home care referral to be sent to [facility]. CM sent referral accordingly. CM called [facility] and spoke to Willard, [phone], who confirmed patient has had [facility] in the past and she will try to expedite patient's home care services and get him scheduled as soon as possible. CM updated patient's wife who is in agreement. CM also called [facility], at [phone], and left a voicemail to update them on case as well. No CM needs voiced or noted at this time.

## 2023-09-11 NOTE — TELEPHONE ENCOUNTER
Care Transitions Initial Follow Up Call    Outreach made within 2 business days of discharge: Yes    Patient: Angélica Stevens Patient : 1935   MRN: [id number]  Reason for Admission: Back pain  Discharge Date: 23       Spoke with: Patient   Spouse/ Declined to schedule appt.  At this time  Discharge department/facility: [facility]                      Follow Up  Future Appointments   Date Time Provider [address] [place] [address]   10/18/2023  2:15 PM Natalya Rosario MD ANDREA [facility] AMB   10/19/2023  1:40 PM KALIE Moore BSND [facility] AMB   2024  1:15 PM Kari Kirby MD [address] Nelida Ceballos MA

## 2023-09-25 DIAGNOSIS — N39.43 BENIGN PROSTATIC HYPERPLASIA WITH POST-VOID DRIBBLING: ICD-10-CM

## 2023-09-25 DIAGNOSIS — M48.062 SPINAL STENOSIS OF LUMBAR REGION WITH NEUROGENIC CLAUDICATION: ICD-10-CM

## 2023-09-25 DIAGNOSIS — N40.1 BENIGN PROSTATIC HYPERPLASIA WITH POST-VOID DRIBBLING: ICD-10-CM

## 2023-09-25 RX ORDER — OXYCODONE HYDROCHLORIDE AND ACETAMINOPHEN 5; 325 MG/1; MG/1
1 TABLET ORAL EVERY 6 HOURS PRN
Qty: 60 TABLET | Refills: 0 | Status: CANCELLED | OUTPATIENT
Start: 2023-09-25 | End: 2023-10-10

## 2023-09-25 RX ORDER — FINASTERIDE 5 MG/1
5 TABLET, FILM COATED ORAL DAILY
Qty: 30 TABLET | Refills: 1 | Status: SHIPPED | OUTPATIENT
Start: 2023-09-25

## 2023-09-25 NOTE — TELEPHONE ENCOUNTER
Pt wife says that his Finasteride 5 mg was discontinued by mistake says her  is completely out of it and can it be re-prescribed

## 2023-10-17 PROBLEM — G89.4 PAIN SYNDROME, CHRONIC: Status: ACTIVE | Noted: 2023-10-06

## 2023-10-17 PROBLEM — R60.0 LOWER EXTREMITY EDEMA: Status: ACTIVE | Noted: 2023-10-06

## 2023-10-18 ENCOUNTER — PROCEDURE VISIT (OUTPATIENT)
Dept: PHYSICAL MEDICINE AND REHAB | Age: 88
End: 2023-10-18

## 2023-10-18 VITALS — BODY MASS INDEX: 29.25 KG/M2 | WEIGHT: 182 LBS | HEIGHT: 66 IN

## 2023-10-18 DIAGNOSIS — G56.21 CUBITAL TUNNEL SYNDROME ON RIGHT: Primary | ICD-10-CM

## 2023-10-23 DIAGNOSIS — N40.1 BENIGN PROSTATIC HYPERPLASIA WITH POST-VOID DRIBBLING: ICD-10-CM

## 2023-10-23 DIAGNOSIS — N39.43 BENIGN PROSTATIC HYPERPLASIA WITH POST-VOID DRIBBLING: ICD-10-CM

## 2023-10-23 RX ORDER — FINASTERIDE 5 MG/1
5 TABLET, FILM COATED ORAL DAILY
Qty: 30 TABLET | Refills: 1 | OUTPATIENT
Start: 2023-10-23

## 2023-10-24 NOTE — TELEPHONE ENCOUNTER
I called and spoke with Eloy Mj, wife, advised her that the PCP or urologist will be the ones to refill his Proscar.

## 2023-10-30 ENCOUNTER — HOSPITAL ENCOUNTER (OUTPATIENT)
Dept: MRI IMAGING | Age: 88
Discharge: HOME | End: 2023-11-02
Payer: MEDICARE

## 2023-10-30 DIAGNOSIS — R20.2 NUMBNESS AND TINGLING OF RIGHT ARM: ICD-10-CM

## 2023-10-30 DIAGNOSIS — R20.0 NUMBNESS AND TINGLING OF RIGHT ARM: ICD-10-CM

## 2023-10-30 PROCEDURE — A9579 GAD-BASE MR CONTRAST NOS,1ML: HCPCS | Performed by: NURSE PRACTITIONER

## 2023-10-30 PROCEDURE — 2580000003 HC RX 258: Performed by: NURSE PRACTITIONER

## 2023-10-30 PROCEDURE — 72156 MRI NECK SPINE W/O & W/DYE: CPT

## 2023-10-30 PROCEDURE — 6360000004 HC RX CONTRAST MEDICATION: Performed by: NURSE PRACTITIONER

## 2023-10-30 RX ORDER — SODIUM CHLORIDE 0.9 % (FLUSH) 0.9 %
20 SYRINGE (ML) INJECTION AS NEEDED
Status: DISCONTINUED | OUTPATIENT
Start: 2023-10-30 | End: 2023-11-03 | Stop reason: HOSPADM

## 2023-10-30 RX ADMIN — SODIUM CHLORIDE, PRESERVATIVE FREE 20 ML: 5 INJECTION INTRAVENOUS at 20:14

## 2023-10-30 RX ADMIN — GADOTERIDOL 16 ML: 279.3 INJECTION, SOLUTION INTRAVENOUS at 20:14

## 2023-10-31 DIAGNOSIS — M48.02 CERVICAL STENOSIS OF SPINAL CANAL: Primary | ICD-10-CM

## 2023-10-31 PROBLEM — G47.33 OSA (OBSTRUCTIVE SLEEP APNEA): Status: ACTIVE | Noted: 2023-10-24

## 2023-11-01 RX ORDER — OXYCODONE HYDROCHLORIDE AND ACETAMINOPHEN 5; 325 MG/1; MG/1
1 TABLET ORAL EVERY 12 HOURS PRN
COMMUNITY

## 2023-11-01 RX ORDER — FAMOTIDINE 20 MG/1
20 TABLET, FILM COATED ORAL DAILY
COMMUNITY

## 2023-11-01 RX ORDER — FUROSEMIDE 20 MG/1
20 TABLET ORAL DAILY
COMMUNITY

## 2023-11-01 RX ORDER — VITAMIN B COMPLEX
1 CAPSULE ORAL DAILY
COMMUNITY

## 2023-11-02 ENCOUNTER — OFFICE VISIT (OUTPATIENT)
Dept: NEUROLOGY | Age: 88
End: 2023-11-02
Payer: MEDICARE

## 2023-11-02 VITALS
BODY MASS INDEX: 29.73 KG/M2 | WEIGHT: 185 LBS | DIASTOLIC BLOOD PRESSURE: 79 MMHG | OXYGEN SATURATION: 95 % | SYSTOLIC BLOOD PRESSURE: 128 MMHG | HEIGHT: 66 IN | HEART RATE: 88 BPM

## 2023-11-02 DIAGNOSIS — G89.29 CHRONIC PAIN OF LEFT KNEE: ICD-10-CM

## 2023-11-02 DIAGNOSIS — M25.562 CHRONIC PAIN OF LEFT KNEE: ICD-10-CM

## 2023-11-02 DIAGNOSIS — G56.21 ULNAR NEUROPATHY AT ELBOW OF RIGHT UPPER EXTREMITY: ICD-10-CM

## 2023-11-02 DIAGNOSIS — M48.02 CERVICAL STENOSIS OF SPINAL CANAL: Primary | ICD-10-CM

## 2023-11-02 PROCEDURE — G8484 FLU IMMUNIZE NO ADMIN: HCPCS | Performed by: NURSE PRACTITIONER

## 2023-11-02 PROCEDURE — 1036F TOBACCO NON-USER: CPT | Performed by: NURSE PRACTITIONER

## 2023-11-02 PROCEDURE — 99214 OFFICE O/P EST MOD 30 MIN: CPT | Performed by: NURSE PRACTITIONER

## 2023-11-02 PROCEDURE — G8417 CALC BMI ABV UP PARAM F/U: HCPCS | Performed by: NURSE PRACTITIONER

## 2023-11-02 PROCEDURE — G8427 DOCREV CUR MEDS BY ELIG CLIN: HCPCS | Performed by: NURSE PRACTITIONER

## 2023-11-02 PROCEDURE — 1123F ACP DISCUSS/DSCN MKR DOCD: CPT | Performed by: NURSE PRACTITIONER

## 2023-11-02 ASSESSMENT — ENCOUNTER SYMPTOMS
RESPIRATORY NEGATIVE: 1
ALLERGIC/IMMUNOLOGIC NEGATIVE: 1
GASTROINTESTINAL NEGATIVE: 1
EYES NEGATIVE: 1

## 2023-11-02 NOTE — PROGRESS NOTES
diameter. There is CSF surrounding the cord. Right foraminal  narrowing due to uncovertebral arthropathy    C4-5: Disc bulge osteophyte complex and ligamentum flavum thickening produces a  central stenosis of 7 mm AP diameter. CSF spaces largely effaced. Mild bilateral  foraminal narrowing. C5-6: Disc bulge osteophyte complex and ligamentum flavum thickening produces a  moderately severe central stenosis. It measures 6 mm AP diameter. The cord is  deformed. C6-7: This level appears fused. No disc herniation. Left foraminal narrowing. There is an asymmetric posterior osteophyte paracentrally on the right. C7-T1: Small central disc protrusion. No significant stenosis or cord  impingement. Possible foraminal narrowing on the left. .    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  2) C3-4: Mild central stenosis and right foraminal narrowing. 3) C4-5: Moderately severe central stenosis of 7 mm AP diameter. CSF spaces  largely effaced. Bilateral foraminal narrowing. 4) C5-6: Moderately severe central stenosis of 6 mm AP diameter. The cord is  deformed. 5) C6-7: This level appears solidly fused. There is an asymmetric posterior  osteophyte paracentral in the right. 08/13/23    TRANSTHORACIC ECHOCARDIOGRAM (TTE) LIMITED (CONTRAST/BUBBLE/3D PRN) 08/14/2023  4:00 PM (Final)    Interpretation Summary    Left Ventricle: Normal left ventricular systolic function with a visually estimated EF of 60 - 65%. Left ventricle size is normal. Normal wall thickness. Normal wall motion. Left Atrium: Left atrium is mildly dilated. Aortic Valve: Moderately calcified cusp. Mitral Valve: Mildly calcified leaflet, at the anterior and posterior leaflets.     Signed by: Benson Le MD on 8/14/2023  4:00 PM  Lab Results   Component Value Date    CHOL 221 08/13/2023    TRIG 171 (H) 08/13/2023    HDL 46 08/13/2023    LDLCALC 140.8 (H) 08/13/2023    LABVLDL 34.2 (H) 08/13/2023    CHOLHDLRATIO 4.8

## 2024-06-09 ENCOUNTER — APPOINTMENT (OUTPATIENT)
Dept: CT IMAGING | Age: 89
End: 2024-06-09
Payer: MEDICARE

## 2024-06-09 ENCOUNTER — HOSPITAL ENCOUNTER (EMERGENCY)
Age: 89
Discharge: HOME | End: 2024-06-09
Attending: STUDENT IN AN ORGANIZED HEALTH CARE EDUCATION/TRAINING PROGRAM
Payer: MEDICARE

## 2024-06-09 ENCOUNTER — APPOINTMENT (OUTPATIENT)
Dept: GENERAL RADIOLOGY | Age: 89
End: 2024-06-09
Payer: MEDICARE

## 2024-06-09 VITALS
HEART RATE: 64 BPM | HEIGHT: 66 IN | BODY MASS INDEX: 28.93 KG/M2 | DIASTOLIC BLOOD PRESSURE: 77 MMHG | WEIGHT: 180 LBS | RESPIRATION RATE: 13 BRPM | SYSTOLIC BLOOD PRESSURE: 119 MMHG | TEMPERATURE: 97.8 F | OXYGEN SATURATION: 91 %

## 2024-06-09 DIAGNOSIS — S22.42XA CLOSED FRACTURE OF MULTIPLE RIBS OF LEFT SIDE, INITIAL ENCOUNTER: Primary | ICD-10-CM

## 2024-06-09 LAB
ALBUMIN SERPL-MCNC: 3 G/DL (ref 3.2–4.6)
ALBUMIN/GLOB SERPL: 1.1 (ref 1–1.9)
ALP SERPL-CCNC: 92 U/L (ref 40–129)
ALT SERPL-CCNC: 23 U/L (ref 12–65)
AST SERPL-CCNC: 23 U/L (ref 15–37)
BASOPHILS # BLD: 0.1 K/UL (ref 0–0.2)
BASOPHILS NFR BLD: 1 % (ref 0–2)
BILIRUB SERPL-MCNC: 0.5 MG/DL (ref 0–1.2)
BUN SERPL-MCNC: 23 MG/DL (ref 8–23)
CALCIUM SERPL-MCNC: 8.9 MG/DL (ref 8.8–10.2)
CHLORIDE SERPL-SCNC: 99 MMOL/L (ref 98–107)
CO2 SERPL-SCNC: 25 MMOL/L (ref 20–28)
CREAT SERPL-MCNC: 0.9 MG/DL (ref 0.8–1.3)
DIFFERENTIAL METHOD BLD: ABNORMAL
EKG ATRIAL RATE: 66 BPM
EKG DIAGNOSIS: NORMAL
EKG P AXIS: 31 DEGREES
EKG P-R INTERVAL: 178 MS
EKG Q-T INTERVAL: 407 MS
EKG QRS DURATION: 86 MS
EKG QTC CALCULATION (BAZETT): 427 MS
EKG R AXIS: 58 DEGREES
EKG T AXIS: 9 DEGREES
EKG VENTRICULAR RATE: 66 BPM
EOSINOPHIL # BLD: 0.3 K/UL (ref 0–0.8)
EOSINOPHIL NFR BLD: 3 % (ref 0.5–7.8)
ERYTHROCYTE [DISTWIDTH] IN BLOOD BY AUTOMATED COUNT: 14.3 % (ref 11.9–14.6)
GLOBULIN SER CALC-MCNC: 2.8 G/DL (ref 2.3–3.5)
GLUCOSE SERPL-MCNC: 102 MG/DL (ref 70–99)
HCT VFR BLD AUTO: 36.3 % (ref 41.1–50.3)
HGB BLD-MCNC: 11.4 G/DL (ref 13.6–17.2)
IMM GRANULOCYTES # BLD AUTO: 0.1 K/UL (ref 0–0.5)
IMM GRANULOCYTES NFR BLD AUTO: 1 % (ref 0–5)
LYMPHOCYTES # BLD: 1.3 K/UL (ref 0.5–4.6)
LYMPHOCYTES NFR BLD: 15 % (ref 13–44)
MCH RBC QN AUTO: 32.8 PG (ref 26.1–32.9)
MCHC RBC AUTO-ENTMCNC: 31.4 G/DL (ref 31.4–35)
MCV RBC AUTO: 104.3 FL (ref 82–102)
MONOCYTES # BLD: 0.9 K/UL (ref 0.1–1.3)
MONOCYTES NFR BLD: 10 % (ref 4–12)
NEUTS SEG # BLD: 6.2 K/UL (ref 1.7–8.2)
NEUTS SEG NFR BLD: 71 % (ref 43–78)
NRBC # BLD: 0 K/UL (ref 0–0.2)
PLATELET # BLD AUTO: 212 K/UL (ref 150–450)
PMV BLD AUTO: 9 FL (ref 9.4–12.3)
POTASSIUM SERPL-SCNC: 4.1 MMOL/L (ref 3.5–5.1)
PROT SERPL-MCNC: 5.8 G/DL (ref 6.3–8.2)
RBC # BLD AUTO: 3.48 M/UL (ref 4.23–5.6)
SODIUM SERPL-SCNC: 138 MMOL/L (ref 136–145)
WBC # BLD AUTO: 8.8 K/UL (ref 4.3–11.1)

## 2024-06-09 PROCEDURE — 6360000004 HC RX CONTRAST MEDICATION: Performed by: STUDENT IN AN ORGANIZED HEALTH CARE EDUCATION/TRAINING PROGRAM

## 2024-06-09 PROCEDURE — 71101 X-RAY EXAM UNILAT RIBS/CHEST: CPT

## 2024-06-09 PROCEDURE — 71260 CT THORAX DX C+: CPT

## 2024-06-09 PROCEDURE — 99285 EMERGENCY DEPT VISIT HI MDM: CPT

## 2024-06-09 PROCEDURE — 93005 ELECTROCARDIOGRAM TRACING: CPT | Performed by: STUDENT IN AN ORGANIZED HEALTH CARE EDUCATION/TRAINING PROGRAM

## 2024-06-09 PROCEDURE — 93010 ELECTROCARDIOGRAM REPORT: CPT | Performed by: INTERNAL MEDICINE

## 2024-06-09 PROCEDURE — 6370000000 HC RX 637 (ALT 250 FOR IP): Performed by: STUDENT IN AN ORGANIZED HEALTH CARE EDUCATION/TRAINING PROGRAM

## 2024-06-09 PROCEDURE — 85025 COMPLETE CBC W/AUTO DIFF WBC: CPT

## 2024-06-09 PROCEDURE — 80053 COMPREHEN METABOLIC PANEL: CPT

## 2024-06-09 RX ORDER — NAPROXEN 375 MG/1
375 TABLET ORAL 2 TIMES DAILY WITH MEALS
Qty: 60 TABLET | Refills: 3 | Status: SHIPPED | OUTPATIENT
Start: 2024-06-09

## 2024-06-09 RX ORDER — LIDOCAINE 4 G/G
1 PATCH TOPICAL DAILY
Qty: 30 PATCH | Refills: 0 | Status: SHIPPED | OUTPATIENT
Start: 2024-06-09 | End: 2024-07-09

## 2024-06-09 RX ORDER — LIDOCAINE 4 G/G
1 PATCH TOPICAL DAILY
Status: DISCONTINUED | OUTPATIENT
Start: 2024-06-09 | End: 2024-06-09 | Stop reason: HOSPADM

## 2024-06-09 RX ORDER — ALBUTEROL SULFATE 90 UG/1
2 AEROSOL, METERED RESPIRATORY (INHALATION) EVERY 6 HOURS PRN
Qty: 1 EACH | Refills: 0 | Status: SHIPPED | OUTPATIENT
Start: 2024-06-09

## 2024-06-09 RX ADMIN — IOPAMIDOL 80 ML: 755 INJECTION, SOLUTION INTRAVENOUS at 10:08

## 2024-06-09 ASSESSMENT — PAIN - FUNCTIONAL ASSESSMENT: PAIN_FUNCTIONAL_ASSESSMENT: 0-10

## 2024-06-09 ASSESSMENT — PAIN DESCRIPTION - DESCRIPTORS: DESCRIPTORS: ACHING

## 2024-06-09 ASSESSMENT — PAIN DESCRIPTION - ORIENTATION: ORIENTATION: LEFT

## 2024-06-09 ASSESSMENT — PAIN DESCRIPTION - LOCATION: LOCATION: ABDOMEN

## 2024-06-09 ASSESSMENT — PAIN SCALES - GENERAL: PAINLEVEL_OUTOF10: 7

## 2024-06-09 NOTE — ED NOTES
Patient mobility status  with mild difficulty. Provider aware     I have reviewed discharge instructions with the patient and spouse.  The patient and spouse verbalized understanding.    Patient left ED via Discharge Method: wheelchair to Home with Spouse and Extended Family:.    Opportunity for questions and clarification provided.     Patient given 3 e scripts.

## 2024-06-09 NOTE — ED TRIAGE NOTES
Patient to triage via EMS from home with c/o falling on his left side during the night. States it was a mechanical fall from standing. Denies LOC or hitting head. Pt is on Pradaxa. Pt states he is still hurting and wanted to come in.

## 2024-06-09 NOTE — ED PROVIDER NOTES
Emergency Department Provider Note       PCP: No primary care provider on file.   Age: 88 y.o.   Sex: male     DISPOSITION       No diagnosis found.    Medical Decision Making     Elderly though generally well-appearing male patient presenting via EMS after unwitnessed mechanical fall last night.  Reporting left chest wall pain  Bedside ultrasound shows no evidence of free fluid or abnormality appreciable to the spleen  Will obtain basic blood work, EKG and chest x-ray with rib films  ED Course as of 06/09/24 1138   Sun Jun 09, 2024   0912 EKG interpretation: Sinus rhythm, rate of 66, normal axis, no ischemia [BR]   0918 Hemoglobin is similar to previous lab results [BR]      ED Course User Index  [BR] Jayden Gutierrez R, DO     1 or more acute illnesses that pose a threat to life or bodily function.   1 or more chronic illnesses with a severe exacerbation or progression.  Prescription drug management performed.  Patient was discharged risks and benefits of hospitalization were considered.  Chronic medical problems impacting care include history of lung cancer.  Shared medical decision making was utilized in creating the patients health plan today.    I independently ordered and reviewed each unique test.  I reviewed external records: ED visit note from an outside group.  I reviewed external records: provider visit note from PCP.  I reviewed external records: provider visit note from outside specialist.   The patients assessment required an independent historian: Spouse, son.  The reason they were needed is important historical information not provided by the patient.  I independently interpreted the cardiac monitor rhythm strip sinus rhythm.  I interpreted the X-rays left-sided rib fractures.  I interpreted the CT Scan no pneumothorax.              History     88-year-old male patient presenting from home via EMS with reports of left chest wall pain after fall from standing height over the course of the

## 2024-06-09 NOTE — DISCHARGE INSTRUCTIONS
Your CT imaging shows evidence of fracture to the fifth and sixth rib.  The remainder of your workup today appears stable.  You have been prescribed anti-inflammatory and topical pain patch to treat symptoms, take these medications in addition to your Percocet for pain control.  Use the incentive spirometer as directed.  Use the inhaler as needed for wheezing and shortness of breath.  Return for worsening symptoms, concerns or questions

## 2024-06-25 ENCOUNTER — HOSPITAL ENCOUNTER (EMERGENCY)
Age: 89
Discharge: HOME | End: 2024-06-25
Attending: EMERGENCY MEDICINE
Payer: MEDICARE

## 2024-06-25 ENCOUNTER — APPOINTMENT (OUTPATIENT)
Dept: CT IMAGING | Age: 89
End: 2024-06-25
Payer: MEDICARE

## 2024-06-25 VITALS
HEART RATE: 88 BPM | SYSTOLIC BLOOD PRESSURE: 108 MMHG | BODY MASS INDEX: 28.93 KG/M2 | DIASTOLIC BLOOD PRESSURE: 68 MMHG | OXYGEN SATURATION: 93 % | HEIGHT: 66 IN | RESPIRATION RATE: 16 BRPM | WEIGHT: 180 LBS | TEMPERATURE: 97.8 F

## 2024-06-25 DIAGNOSIS — S30.0XXA LUMBAR CONTUSION, INITIAL ENCOUNTER: ICD-10-CM
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
## 2024-06-25 PROCEDURE — 72131 CT LUMBAR SPINE W/O DYE: CPT

## 2024-06-25 PROCEDURE — 70450 CT HEAD/BRAIN W/O DYE: CPT

## 2024-06-25 PROCEDURE — 72125 CT NECK SPINE W/O DYE: CPT

## 2024-06-25 PROCEDURE — 99284 EMERGENCY DEPT VISIT MOD MDM: CPT

## 2024-06-25 ASSESSMENT — ENCOUNTER SYMPTOMS
COUGH: 0
EYE ITCHING: 0
ABDOMINAL PAIN: 0
NAUSEA: 0
EYE DISCHARGE: 0
SHORTNESS OF BREATH: 0
CHEST TIGHTNESS: 0
VOMITING: 0
DIARRHEA: 0
BACK PAIN: 1

## 2024-06-25 ASSESSMENT — PAIN SCALES - GENERAL
PAINLEVEL_OUTOF10: 6
PAINLEVEL_OUTOF10: 6

## 2024-06-25 ASSESSMENT — PAIN - FUNCTIONAL ASSESSMENT: PAIN_FUNCTIONAL_ASSESSMENT: 0-10

## 2024-06-25 NOTE — ED PROVIDER NOTES
employed.    FINDINGS:   5 nonrib-bearing vertebral bodies are present with the inferior most complete  disc space representing L5-S1. Grade 1 retrolisthesis of L3 on L4, L2 on L3, and  L1 on L2. Vertebral body heights appear maintained. Fracture of S3 vertebral  body of uncertain chronicity. Subacute to chronic left 11th and 12th rib  fractures noted. Moderate loss of disc heights. Scattered degenerative changes.  Mild and moderate canal stenoses. Mild and moderate foraminal stenoses.  Posterior elements appear intact. Paravertebral soft tissues appear symmetric.      Impression    Fracture of the S3 vertebral body of uncertain chronicity. Subacute left 11th  and 12th rib fractures. Other findings as described.      Electronically signed by Vikki Velázquez         CT HEAD WO CONTRAST   Final Result   No CT evidence of acute intracranial or cervical abnormality.      Electronically signed by Carlos Patricia      CT CERVICAL SPINE WO CONTRAST   Final Result   No CT evidence of acute intracranial or cervical abnormality.      Electronically signed by Carlos Patricia      CT LUMBAR SPINE WO CONTRAST   Final Result   Fracture of the S3 vertebral body of uncertain chronicity. Subacute left 11th   and 12th rib fractures. Other findings as described.         Electronically signed by Vikki Velázquez                   No results for input(s): \"COVID19\" in the last 72 hours.    Voice dictation software was used during the making of this note.  This software is not perfect and grammatical and other typographical errors may be present.  This note has not been completely proofread for errors.      Amado Solis MD  06/25/24 2910

## 2024-06-25 NOTE — DISCHARGE INSTRUCTIONS
Continue your current medications  Strict fall precautions  Drink plenty of fluids  Recheck with your primary care provider    Return to ER for any worsening symptoms or new problems which may arise

## 2024-06-25 NOTE — ED NOTES
Patient mobility status  wheelchair bound. Provider aware     I have reviewed discharge instructions with the patient and spouse.  The patient and spouse verbalized understanding.    Patient left ED via Discharge Method: wheelchair to Home with Spouse.    Opportunity for questions and clarification provided.     Patient given 0 scripts.

## 2024-06-25 NOTE — ED TRIAGE NOTES
Pt wheelchair bound and lives at home.  Pt having increased falls out of wheelchair.  2 broken left ribs and torn right rotator cuff from previous falls.  Pt had another fall this morning and now complaining of low left back pain.  Pt has some bruising and states he is on blood thinner. Pt denies hitting or LOC.

## 2024-08-09 ENCOUNTER — APPOINTMENT (OUTPATIENT)
Dept: CT IMAGING | Age: 89
DRG: 812 | End: 2024-08-09
Payer: MEDICARE

## 2024-08-09 ENCOUNTER — APPOINTMENT (OUTPATIENT)
Dept: GENERAL RADIOLOGY | Age: 89
DRG: 812 | End: 2024-08-09
Payer: MEDICARE

## 2024-08-09 ENCOUNTER — HOSPITAL ENCOUNTER (INPATIENT)
Age: 89
LOS: 1 days | Discharge: HOME HEALTH | DRG: 812 | End: 2024-08-12
Attending: EMERGENCY MEDICINE | Admitting: FAMILY MEDICINE
Payer: MEDICARE

## 2024-08-09 DIAGNOSIS — D64.9 SYMPTOMATIC ANEMIA: Primary | ICD-10-CM

## 2024-08-09 DIAGNOSIS — S32.10XA CLOSED FRACTURE OF SACRUM, UNSPECIFIED FRACTURE MORPHOLOGY, INITIAL ENCOUNTER (HCC): ICD-10-CM

## 2024-08-09 DIAGNOSIS — R29.6 MULTIPLE FALLS: ICD-10-CM

## 2024-08-09 LAB
ANION GAP SERPL CALC-SCNC: 11 MMOL/L (ref 9–18)
BUN SERPL-MCNC: 19 MG/DL (ref 8–23)
CALCIUM SERPL-MCNC: 8.5 MG/DL (ref 8.8–10.2)
CHLORIDE SERPL-SCNC: 102 MMOL/L (ref 98–107)
CO2 SERPL-SCNC: 24 MMOL/L (ref 20–28)
CREAT SERPL-MCNC: 0.81 MG/DL (ref 0.8–1.3)
ERYTHROCYTE [DISTWIDTH] IN BLOOD BY AUTOMATED COUNT: 15.3 % (ref 11.9–14.6)
GLUCOSE SERPL-MCNC: 94 MG/DL (ref 70–99)
HCT VFR BLD AUTO: 25 % (ref 41.1–50.3)
HGB BLD-MCNC: 7.2 G/DL (ref 13.6–17.2)
MCH RBC QN AUTO: 27.3 PG (ref 26.1–32.9)
MCHC RBC AUTO-ENTMCNC: 28.8 G/DL (ref 31.4–35)
MCV RBC AUTO: 94.7 FL (ref 82–102)
NRBC # BLD: 0 K/UL (ref 0–0.2)
PLATELET # BLD AUTO: 333 K/UL (ref 150–450)
PMV BLD AUTO: 9.5 FL (ref 9.4–12.3)
POTASSIUM SERPL-SCNC: 4.6 MMOL/L (ref 3.5–5.1)
RBC # BLD AUTO: 2.64 M/UL (ref 4.23–5.6)
SODIUM SERPL-SCNC: 137 MMOL/L (ref 136–145)
WBC # BLD AUTO: 9.4 K/UL (ref 4.3–11.1)

## 2024-08-09 PROCEDURE — 6370000000 HC RX 637 (ALT 250 FOR IP): Performed by: EMERGENCY MEDICINE

## 2024-08-09 PROCEDURE — 99285 EMERGENCY DEPT VISIT HI MDM: CPT

## 2024-08-09 PROCEDURE — 2580000003 HC RX 258: Performed by: FAMILY MEDICINE

## 2024-08-09 PROCEDURE — 86901 BLOOD TYPING SEROLOGIC RH(D): CPT

## 2024-08-09 PROCEDURE — G0378 HOSPITAL OBSERVATION PER HR: HCPCS

## 2024-08-09 PROCEDURE — 86900 BLOOD TYPING SEROLOGIC ABO: CPT

## 2024-08-09 PROCEDURE — 72192 CT PELVIS W/O DYE: CPT

## 2024-08-09 PROCEDURE — 86923 COMPATIBILITY TEST ELECTRIC: CPT

## 2024-08-09 PROCEDURE — 72220 X-RAY EXAM SACRUM TAILBONE: CPT

## 2024-08-09 PROCEDURE — 85027 COMPLETE CBC AUTOMATED: CPT

## 2024-08-09 PROCEDURE — 86850 RBC ANTIBODY SCREEN: CPT

## 2024-08-09 PROCEDURE — 80048 BASIC METABOLIC PNL TOTAL CA: CPT

## 2024-08-09 RX ORDER — OXYCODONE HYDROCHLORIDE AND ACETAMINOPHEN 5; 325 MG/1; MG/1
1 TABLET ORAL
Status: COMPLETED | OUTPATIENT
Start: 2024-08-09 | End: 2024-08-09

## 2024-08-09 RX ORDER — MECLIZINE HYDROCHLORIDE 25 MG/1
25 TABLET ORAL 3 TIMES DAILY PRN
Status: DISCONTINUED | OUTPATIENT
Start: 2024-08-09 | End: 2024-08-12 | Stop reason: HOSPADM

## 2024-08-09 RX ORDER — LOSARTAN POTASSIUM 50 MG/1
50 TABLET ORAL DAILY
COMMUNITY

## 2024-08-09 RX ORDER — FAMOTIDINE 20 MG/1
20 TABLET, FILM COATED ORAL DAILY
Status: DISCONTINUED | OUTPATIENT
Start: 2024-08-10 | End: 2024-08-12 | Stop reason: HOSPADM

## 2024-08-09 RX ORDER — OXYBUTYNIN CHLORIDE 10 MG/1
10 TABLET, EXTENDED RELEASE ORAL DAILY
COMMUNITY

## 2024-08-09 RX ORDER — FINASTERIDE 5 MG/1
5 TABLET, FILM COATED ORAL DAILY
Status: DISCONTINUED | OUTPATIENT
Start: 2024-08-10 | End: 2024-08-10

## 2024-08-09 RX ORDER — SODIUM CHLORIDE 0.9 % (FLUSH) 0.9 %
5-40 SYRINGE (ML) INJECTION EVERY 12 HOURS SCHEDULED
Status: DISCONTINUED | OUTPATIENT
Start: 2024-08-09 | End: 2024-08-12 | Stop reason: HOSPADM

## 2024-08-09 RX ORDER — POTASSIUM CHLORIDE 20 MEQ/1
40 TABLET, EXTENDED RELEASE ORAL PRN
Status: DISCONTINUED | OUTPATIENT
Start: 2024-08-09 | End: 2024-08-12 | Stop reason: HOSPADM

## 2024-08-09 RX ORDER — TIZANIDINE 2 MG/1
2 TABLET ORAL EVERY 6 HOURS PRN
Status: DISCONTINUED | OUTPATIENT
Start: 2024-08-09 | End: 2024-08-10

## 2024-08-09 RX ORDER — MAGNESIUM SULFATE IN WATER 40 MG/ML
2000 INJECTION, SOLUTION INTRAVENOUS PRN
Status: DISCONTINUED | OUTPATIENT
Start: 2024-08-09 | End: 2024-08-12 | Stop reason: HOSPADM

## 2024-08-09 RX ORDER — POLYETHYLENE GLYCOL 3350 17 G/17G
17 POWDER, FOR SOLUTION ORAL DAILY PRN
Status: DISCONTINUED | OUTPATIENT
Start: 2024-08-09 | End: 2024-08-12 | Stop reason: HOSPADM

## 2024-08-09 RX ORDER — POTASSIUM CHLORIDE 7.45 MG/ML
10 INJECTION INTRAVENOUS PRN
Status: DISCONTINUED | OUTPATIENT
Start: 2024-08-09 | End: 2024-08-12 | Stop reason: HOSPADM

## 2024-08-09 RX ORDER — FUROSEMIDE 20 MG/1
20 TABLET ORAL DAILY
Status: DISCONTINUED | OUTPATIENT
Start: 2024-08-10 | End: 2024-08-12 | Stop reason: HOSPADM

## 2024-08-09 RX ORDER — DABIGATRAN ETEXILATE 150 MG/1
150 CAPSULE ORAL 2 TIMES DAILY
Status: DISCONTINUED | OUTPATIENT
Start: 2024-08-09 | End: 2024-08-12 | Stop reason: HOSPADM

## 2024-08-09 RX ORDER — ACETAMINOPHEN 325 MG/1
650 TABLET ORAL EVERY 6 HOURS PRN
Status: DISCONTINUED | OUTPATIENT
Start: 2024-08-09 | End: 2024-08-12 | Stop reason: HOSPADM

## 2024-08-09 RX ORDER — SODIUM CHLORIDE 0.9 % (FLUSH) 0.9 %
5-40 SYRINGE (ML) INJECTION PRN
Status: DISCONTINUED | OUTPATIENT
Start: 2024-08-09 | End: 2024-08-12 | Stop reason: HOSPADM

## 2024-08-09 RX ORDER — ESCITALOPRAM OXALATE 20 MG/1
20 TABLET ORAL DAILY
COMMUNITY

## 2024-08-09 RX ORDER — SODIUM CHLORIDE 9 MG/ML
INJECTION, SOLUTION INTRAVENOUS CONTINUOUS
Status: DISCONTINUED | OUTPATIENT
Start: 2024-08-09 | End: 2024-08-11

## 2024-08-09 RX ORDER — ACETAMINOPHEN 650 MG/1
650 SUPPOSITORY RECTAL EVERY 6 HOURS PRN
Status: DISCONTINUED | OUTPATIENT
Start: 2024-08-09 | End: 2024-08-12 | Stop reason: HOSPADM

## 2024-08-09 RX ORDER — OXYCODONE HYDROCHLORIDE AND ACETAMINOPHEN 5; 325 MG/1; MG/1
1 TABLET ORAL EVERY 12 HOURS PRN
Status: DISCONTINUED | OUTPATIENT
Start: 2024-08-09 | End: 2024-08-10

## 2024-08-09 RX ORDER — METHOCARBAMOL 750 MG/1
750 TABLET, FILM COATED ORAL 3 TIMES DAILY PRN
COMMUNITY

## 2024-08-09 RX ORDER — ONDANSETRON 4 MG/1
4 TABLET, ORALLY DISINTEGRATING ORAL EVERY 8 HOURS PRN
Status: DISCONTINUED | OUTPATIENT
Start: 2024-08-09 | End: 2024-08-12 | Stop reason: HOSPADM

## 2024-08-09 RX ORDER — BUPROPION HYDROCHLORIDE 75 MG/1
75 TABLET ORAL DAILY
COMMUNITY

## 2024-08-09 RX ORDER — ONDANSETRON 2 MG/ML
4 INJECTION INTRAMUSCULAR; INTRAVENOUS EVERY 6 HOURS PRN
Status: DISCONTINUED | OUTPATIENT
Start: 2024-08-09 | End: 2024-08-12 | Stop reason: HOSPADM

## 2024-08-09 RX ORDER — ATORVASTATIN CALCIUM 40 MG/1
80 TABLET, FILM COATED ORAL DAILY
Status: DISCONTINUED | OUTPATIENT
Start: 2024-08-10 | End: 2024-08-12 | Stop reason: HOSPADM

## 2024-08-09 RX ORDER — CETIRIZINE HYDROCHLORIDE 5 MG/1
5 TABLET ORAL DAILY
Status: DISCONTINUED | OUTPATIENT
Start: 2024-08-10 | End: 2024-08-12 | Stop reason: HOSPADM

## 2024-08-09 RX ORDER — SODIUM CHLORIDE 9 MG/ML
INJECTION, SOLUTION INTRAVENOUS PRN
Status: DISCONTINUED | OUTPATIENT
Start: 2024-08-09 | End: 2024-08-12 | Stop reason: HOSPADM

## 2024-08-09 RX ADMIN — SODIUM CHLORIDE: 9 INJECTION, SOLUTION INTRAVENOUS at 23:23

## 2024-08-09 RX ADMIN — OXYCODONE HYDROCHLORIDE AND ACETAMINOPHEN 1 TABLET: 5; 325 TABLET ORAL at 16:38

## 2024-08-09 ASSESSMENT — PAIN DESCRIPTION - ORIENTATION: ORIENTATION: LEFT

## 2024-08-09 ASSESSMENT — PAIN SCALES - GENERAL: PAINLEVEL_OUTOF10: 8

## 2024-08-09 ASSESSMENT — PAIN DESCRIPTION - LOCATION: LOCATION: BUTTOCKS

## 2024-08-09 ASSESSMENT — PAIN - FUNCTIONAL ASSESSMENT: PAIN_FUNCTIONAL_ASSESSMENT: 0-10

## 2024-08-09 NOTE — ED TRIAGE NOTES
Pt presents via EMS from home after a fall earlier today.  Patient fell earlier today and EMS helped him get unstuck from between the toilet and bath tub.  Pt denies hitting head, LOC, but does take thinners.  Pt has history of Vertigo.      Pt falls about 1-2x per day.

## 2024-08-09 NOTE — ED PROVIDER NOTES
Emergency Department Provider Note       PCP: No, Pcp   Age: 88 y.o.   Sex: male     DISPOSITION Decision To Admit 08/09/2024 07:52:18 PM       ICD-10-CM    1. Symptomatic anemia  D64.9       2. Multiple falls  R29.6       3. Closed fracture of sacrum, unspecified fracture morphology, initial encounter ([facility])  S32.10XA           Medical Decision Making     Isolated sacral injury from fall into toilet paper stand today.  Elderly and on blood thinners but denies headache head injury or loss of consciousness.  Neck and spine are nontender with the exception of some tenderness over the sacral area.  Imaging will be obtained.  Multiple falls with history of cervical spine stenosis.  Review of neurosurgical notes in the last couple of months reveals some concern for normal pressure hydrocephalus.  The plan was to do L PE and see if he improved, however blood thinners needed to be stopped first.  Outpatient workup ongoing.    7:52 PM  Hemoglobin is dropped from 11-7 in just 2 months.  Rectal exam shows brown stool and is heme-negative but it is hard to imagine a drop of this significance is simply due to iron deficiency anemia.  Will seek admission for serial hemoglobins and possible blood transfusion.  ED Course as of 08/09/24 1952   Fri Aug 09, 2024   1820 Radiology is concerned about possible sacral fracture and recommends CT [KM]   1843 Patient's wife is arrived and reports [facility] few days [facility] and his hemoglobin had dropped into the sevens.  She also reports black stool.  Will place an IV check labs to type and screen.  I will perform a rectal exam to check for melena/hematochezia/guaiac blood presents [KM]   1941 On rectal exam stool is brown and is heme-negative [KM]      ED Course User Index  [KM] Ten Rubalcava MD     1 acute complicated illness or injury.  Shared medical decision making was utilized in creating the patients health plan today.    I independently ordered and reviewed each

## 2024-08-10 PROBLEM — D64.9 SYMPTOMATIC ANEMIA: Status: ACTIVE | Noted: 2024-08-10

## 2024-08-10 PROBLEM — E77.8 HYPOPROTEINEMIA (HCC): Status: ACTIVE | Noted: 2024-08-10

## 2024-08-10 LAB
ALBUMIN SERPL-MCNC: 2.4 G/DL (ref 3.2–4.6)
ALBUMIN/GLOB SERPL: 1 (ref 1–1.9)
ALP SERPL-CCNC: 86 U/L (ref 40–129)
ALT SERPL-CCNC: 9 U/L (ref 12–65)
ANION GAP SERPL CALC-SCNC: 10 MMOL/L (ref 9–18)
AST SERPL-CCNC: 15 U/L (ref 15–37)
BASOPHILS # BLD: 0 K/UL (ref 0–0.2)
BASOPHILS NFR BLD: 0 % (ref 0–2)
BILIRUB SERPL-MCNC: 0.3 MG/DL (ref 0–1.2)
BUN SERPL-MCNC: 18 MG/DL (ref 8–23)
CALCIUM SERPL-MCNC: 8.1 MG/DL (ref 8.8–10.2)
CHLORIDE SERPL-SCNC: 104 MMOL/L (ref 98–107)
CO2 SERPL-SCNC: 24 MMOL/L (ref 20–28)
CREAT SERPL-MCNC: 0.73 MG/DL (ref 0.8–1.3)
DIFFERENTIAL METHOD BLD: ABNORMAL
EOSINOPHIL # BLD: 0.3 K/UL (ref 0–0.8)
EOSINOPHIL NFR BLD: 4 % (ref 0.5–7.8)
ERYTHROCYTE [DISTWIDTH] IN BLOOD BY AUTOMATED COUNT: 15.4 % (ref 11.9–14.6)
FERRITIN SERPL-MCNC: 28 NG/ML (ref 8–388)
FOLATE SERPL-MCNC: 13.7 NG/ML (ref 3.1–17.5)
GLOBULIN SER CALC-MCNC: 2.5 G/DL (ref 2.3–3.5)
GLUCOSE SERPL-MCNC: 91 MG/DL (ref 70–99)
HCT VFR BLD AUTO: 20.9 % (ref 41.1–50.3)
HCT VFR BLD AUTO: 26.7 % (ref 41.1–50.3)
HGB BLD-MCNC: 6.1 G/DL (ref 13.6–17.2)
HGB BLD-MCNC: 8.1 G/DL (ref 13.6–17.2)
HGB RETIC QN AUTO: 23 PG (ref 29–35)
HISTORY CHECK: NORMAL
IMM GRANULOCYTES # BLD AUTO: 0 K/UL (ref 0–0.5)
IMM GRANULOCYTES NFR BLD AUTO: 0 % (ref 0–5)
IMM RETICS NFR: 29.5 % (ref 2.3–13.4)
IRON SATN MFR SERPL: 6 % (ref 20–50)
IRON SERPL-MCNC: 15 UG/DL (ref 35–100)
LYMPHOCYTES # BLD: 1.1 K/UL (ref 0.5–4.6)
LYMPHOCYTES NFR BLD: 15 % (ref 13–44)
MCH RBC QN AUTO: 27.1 PG (ref 26.1–32.9)
MCHC RBC AUTO-ENTMCNC: 29.2 G/DL (ref 31.4–35)
MCV RBC AUTO: 92.9 FL (ref 82–102)
MONOCYTES # BLD: 0.6 K/UL (ref 0.1–1.3)
MONOCYTES NFR BLD: 9 % (ref 4–12)
NEUTS SEG # BLD: 5.2 K/UL (ref 1.7–8.2)
NEUTS SEG NFR BLD: 72 % (ref 43–78)
NRBC # BLD: 0 K/UL (ref 0–0.2)
PLATELET # BLD AUTO: 281 K/UL (ref 150–450)
PMV BLD AUTO: 9.4 FL (ref 9.4–12.3)
POTASSIUM SERPL-SCNC: 3.9 MMOL/L (ref 3.5–5.1)
PROT SERPL-MCNC: 4.9 G/DL (ref 6.3–8.2)
RBC # BLD AUTO: 2.25 M/UL (ref 4.23–5.6)
RETICS # AUTO: 0.05 M/UL (ref 0.03–0.1)
RETICS/RBC NFR AUTO: 2.4 % (ref 0.3–2)
SODIUM SERPL-SCNC: 138 MMOL/L (ref 136–145)
TIBC SERPL-MCNC: 258 UG/DL (ref 240–450)
UIBC SERPL-MCNC: 243 UG/DL (ref 112–347)
VIT B12 SERPL-MCNC: 1147 PG/ML (ref 193–986)
WBC # BLD AUTO: 7.2 K/UL (ref 4.3–11.1)

## 2024-08-10 PROCEDURE — 83550 IRON BINDING TEST: CPT

## 2024-08-10 PROCEDURE — G0378 HOSPITAL OBSERVATION PER HR: HCPCS

## 2024-08-10 PROCEDURE — 96374 THER/PROPH/DIAG INJ IV PUSH: CPT

## 2024-08-10 PROCEDURE — P9016 RBC LEUKOCYTES REDUCED: HCPCS

## 2024-08-10 PROCEDURE — 82746 ASSAY OF FOLIC ACID SERUM: CPT

## 2024-08-10 PROCEDURE — 82728 ASSAY OF FERRITIN: CPT

## 2024-08-10 PROCEDURE — 6370000000 HC RX 637 (ALT 250 FOR IP): Performed by: INTERNAL MEDICINE

## 2024-08-10 PROCEDURE — 6370000000 HC RX 637 (ALT 250 FOR IP): Performed by: FAMILY MEDICINE

## 2024-08-10 PROCEDURE — 85018 HEMOGLOBIN: CPT

## 2024-08-10 PROCEDURE — 85025 COMPLETE CBC W/AUTO DIFF WBC: CPT

## 2024-08-10 PROCEDURE — 30233N1 TRANSFUSION OF NONAUTOLOGOUS RED BLOOD CELLS INTO PERIPHERAL VEIN, PERCUTANEOUS APPROACH: ICD-10-PCS | Performed by: INTERNAL MEDICINE

## 2024-08-10 PROCEDURE — 82607 VITAMIN B-12: CPT

## 2024-08-10 PROCEDURE — 2580000003 HC RX 258: Performed by: INTERNAL MEDICINE

## 2024-08-10 PROCEDURE — 6360000002 HC RX W HCPCS: Performed by: FAMILY MEDICINE

## 2024-08-10 PROCEDURE — 36430 TRANSFUSION BLD/BLD COMPNT: CPT

## 2024-08-10 PROCEDURE — 83540 ASSAY OF IRON: CPT

## 2024-08-10 PROCEDURE — 36415 COLL VENOUS BLD VENIPUNCTURE: CPT

## 2024-08-10 PROCEDURE — 2580000003 HC RX 258: Performed by: FAMILY MEDICINE

## 2024-08-10 PROCEDURE — 80053 COMPREHEN METABOLIC PANEL: CPT

## 2024-08-10 PROCEDURE — 85014 HEMATOCRIT: CPT

## 2024-08-10 PROCEDURE — 85046 RETICYTE/HGB CONCENTRATE: CPT

## 2024-08-10 RX ORDER — OXYCODONE HYDROCHLORIDE AND ACETAMINOPHEN 5; 325 MG/1; MG/1
1 TABLET ORAL 3 TIMES DAILY PRN
Status: DISCONTINUED | OUTPATIENT
Start: 2024-08-10 | End: 2024-08-12 | Stop reason: HOSPADM

## 2024-08-10 RX ORDER — SODIUM CHLORIDE 9 MG/ML
INJECTION, SOLUTION INTRAVENOUS PRN
Status: COMPLETED | OUTPATIENT
Start: 2024-08-10 | End: 2024-08-10

## 2024-08-10 RX ADMIN — FAMOTIDINE 20 MG: 20 TABLET, FILM COATED ORAL at 09:14

## 2024-08-10 RX ADMIN — PANTOPRAZOLE SODIUM 40 MG: 40 INJECTION, POWDER, FOR SOLUTION INTRAVENOUS at 09:13

## 2024-08-10 RX ADMIN — SODIUM CHLORIDE: 9 INJECTION, SOLUTION INTRAVENOUS at 14:35

## 2024-08-10 RX ADMIN — OXYCODONE HYDROCHLORIDE AND ACETAMINOPHEN 1 TABLET: 5; 325 TABLET ORAL at 09:14

## 2024-08-10 RX ADMIN — CETIRIZINE HYDROCHLORIDE 5 MG: 5 TABLET ORAL at 09:14

## 2024-08-10 RX ADMIN — ATORVASTATIN CALCIUM 80 MG: 40 TABLET, FILM COATED ORAL at 20:13

## 2024-08-10 RX ADMIN — FUROSEMIDE 20 MG: 20 TABLET ORAL at 09:13

## 2024-08-10 RX ADMIN — SODIUM CHLORIDE, PRESERVATIVE FREE 10 ML: 5 INJECTION INTRAVENOUS at 09:20

## 2024-08-10 RX ADMIN — OXYCODONE HYDROCHLORIDE AND ACETAMINOPHEN 1 TABLET: 5; 325 TABLET ORAL at 20:13

## 2024-08-10 RX ADMIN — SODIUM CHLORIDE: 9 INJECTION, SOLUTION INTRAVENOUS at 10:46

## 2024-08-10 ASSESSMENT — PAIN SCALES - GENERAL
PAINLEVEL_OUTOF10: 4
PAINLEVEL_OUTOF10: 8

## 2024-08-10 ASSESSMENT — PAIN DESCRIPTION - LOCATION
LOCATION: BACK
LOCATION: GENERALIZED

## 2024-08-10 ASSESSMENT — PAIN DESCRIPTION - DESCRIPTORS: DESCRIPTORS: ACHING

## 2024-08-10 ASSESSMENT — PAIN DESCRIPTION - ORIENTATION: ORIENTATION: LOWER

## 2024-08-10 ASSESSMENT — PAIN - FUNCTIONAL ASSESSMENT: PAIN_FUNCTIONAL_ASSESSMENT: PREVENTS OR INTERFERES SOME ACTIVE ACTIVITIES AND ADLS

## 2024-08-10 NOTE — PLAN OF CARE
Problem: Discharge Planning  Goal: Discharge to home or other facility with appropriate resources  Outcome: Progressing  Flowsheets (Taken 8/9/2024 2053)  Discharge to home or other facility with appropriate resources: Identify discharge learning needs (meds, wound care, etc)     Problem: Pain  Goal: Verbalizes/displays adequate comfort level or baseline comfort level  Outcome: Progressing     Problem: Skin/Tissue Integrity  Goal: Absence of new skin breakdown  Description: 1.  Monitor for areas of redness and/or skin breakdown  2.  Assess vascular access sites hourly  3.  Every 4-6 hours minimum:  Change oxygen saturation probe site  4.  Every 4-6 hours:  If on nasal continuous positive airway pressure, respiratory therapy assess nares and determine need for appliance change or resting period.  Outcome: Progressing

## 2024-08-10 NOTE — PROGRESS NOTES
A new room has now become available closer to nurses station. Patient moved to room 351 at this time. Patient's spouse at bedside and safety measures in place.

## 2024-08-10 NOTE — CONSULTS
GI NOTE            Date:8/10/2024        Patient Name:Rogerio Owens     YOB: 1935     Age:88 y.o.         87 yo M with history of prior blood clots and colon resection and well as history of lung cancer, presents to the ED with complaints of recent fall in the bathroom. Patient is currently on Pradaxa for history of blood clots. Patient was in the bathroom and sustained a fall and landed on his back without any LOC of head injury. He has been feeling weak recently and mentioned that he has noticed some darker appearing stool over the past month. No recent overt bleeding. Patient was found to have a Hgb of 6.1 with MCV of 92.9 and prior ferritin level was 156 in 2023. He was found to have heme negative stools. No other complaints at this time.    Acute on chronic anemia- in the setting of Pradaxa, anemia is normocytic in nature and stools heme negative which is reassuring that chronic GI bleed is less likely (but stil possible). Given patients advanced age, the fact that he is still on his Pradaxa which would need to be help until at least Monday (48hrs), and without any signs for overt bleeding, I would recommend against any invasive procedures in this patient.       Given the above and the fact that the patient is higher risk for procedural complications given his advanced age, I would recommend against any further workup from the GI standpoint; check patients iron indices at that time (pre-transfusion) and start patient on IV/PO iron supplementation signs for KENDALL  I would transfuse 2 units of pRBCS and if no signs for KENDALL, then Hematology evaluation maybe worthwhile  Given patients age and risk for bleeding and recurrent falls, I would suggest switching patient to low dose ASA if possible and discontinuation of Pradaxa  Will need to be on a PPI daily indefinitely if on any blood thinners and monitor blood counts closely     Plan- discussed with Dr De Jesus      Thank you for involving the GI

## 2024-08-10 NOTE — PROGRESS NOTES
POST FALL MANAGEMENT    Rogerio Owens  MEDICAL RECORD NUMBER:  [id number]  AGE: 88 y.o.   GENDER: male  : 1935  TODAYS DATE:  8/10/2024    Details     Fall Occurred: Yes    Was the Fall Witnessed:  No      Brief Review of Event: Patient assistant Qiana Pérez RN presented to patients room due to alarm going off. On her arrival patient was on his knees at the bedside, with his chest between the siderails of the bed. Patient stated he did not fall, that he crawled out of bed to look for his cell phone. Patient denied hitting his head and denies pain. Several staff including primary RN (self) presented to patients room and returned patient to bed. No noted trauma except scrape to left knee.    Who found the patient: Qiana Pérez RN  Where was the patient at the time of the fall: At the bedside  Patient Comments: N/A       Date Fall Occurred:  2024 .       Time Fall Occurred: 10:55p.m.     Assessment     Post Fall Head to Toe Assessment Completed: Yes    Post Fall Predictive Analytic Score Reviewed: Yes    Post Fall Neuro Checks Completed: Yes    Injury Occurred(if yes, describe injury):  yes - Minor scrape to left knee           Did the Patient Experience:(Check Loco all that apply)    [] Patient hit head  [] Loss of consciousness  [] Change in mental status following the fall  [x] Patient is on an anticoagulant medication      CT Performed:  no    Follow-up     Persons Notified of Fall:  (Provide names of persons notified)   [x] Physician: Adal Douglas MD  [] LEONEL:  [x] Nursing Supervisior: Indira Gonsales RN  [] Manager:  [] Pharmacist:  [x] Family: Wife Yuni Owens  [x] Other: Obdulia Lacy RN- Charge Nurse      Electronically signed by Mehreen nUderwood RN 8/10/2024 at 3:21 AM

## 2024-08-10 NOTE — ACP (ADVANCE CARE PLANNING)
Advance Care Planning   General Advance Care Planning (ACP) Conversation    Date of Conversation: 8/9/2024  Conducted with: Patient with Decision Making Capacity and Legal next of kin  Other persons present: Spouse Yuni Owens    Healthcare Decision Maker:    Primary Decision Maker: Yuni Owens - Spouse - [phone]    Secondary Decision Maker: PatmargaritaCharlotte - Child - [phone]    Secondary Decision Maker: Fidencio Owens - Child - [phone]    Today we documented Decision Maker(s) consistent with ACP documents on file.    Content/Action Overview:  Has ACP document(s) on file - reflects the patient's care preferences  Reviewed DNR/DNI and patient elects Full Code (Attempt Resuscitation)  Patient reports wishes to be resuscitated at this time. HCPOA aware of patients wishes.      Length of Voluntary ACP Conversation in minutes:  <16 minutes (Non-Billable)    ROBERTO FOX

## 2024-08-10 NOTE — CARE COORDINATION
CYNDI CM met with pt and spouse at bedside for initial CM assessment. Pt admitted observation status with symptomatic anemia. ROSALES letter signed and scanned to medical records. Pt alert and oriented x 4. Demographics, insurance, and PCP discussed and verified. [facility] approximately 2-3 months ago. Pt lives with spouse in one level home with walk-in shower. DME of wheelchair, rollator, and shower chair with back in home. Pt utilizes wheelchair and rollator for mobility at baseline. Spouse reports [facility] to [facility] with motorized wheelchair this coming Tuesday and build a ramp to utilize. Pt requires assistance with ADLs PTA. Pt able to feed self and brush teeth, per spouse. Spouse reports frequent falls. Spouse states pt with urinary incontinence at base line. Pt current with [facility]OT services and has an aide from [facility] 3 times a week for 3 hours a day. Pt with supportive family. Spouse plans to provide transportation at discharge.      sent resumption of care to [facility]  for PT/OT services.    [facility] notified [facility].    DC/POC to return home with spouse and resumption of [facility]  PT/OT services when medically stable.    CM team to continue to follow for any needs that may arise.          08/10/24 1013   Service Assessment   Patient Orientation Alert and Oriented;Person;Place;Situation;Self   Cognition Alert   History Provided By Patient;Spouse   Primary Caregiver Spouse   Accompanied By/Relationship Spouse Yuni   Support Systems Spouse/Significant Other;Children;Family Members;Friends/Neighbors;Home Care Staff;Other (Comment)  ([facility] PT/OT and Aide 3 x week for 3 hours a day with [facility])   Patient's Healthcare Decision Maker is: Legal Next of Kin   PCP Verified by CM Yes  ([facility])   Last Visit to PCP Within last 3 months   Prior Functional Level Assistance with the

## 2024-08-10 NOTE — ED NOTES
TRANSFER - OUT REPORT:    Verbal report given to Obdulia QUISPE on Rogerio Owens  being transferred to [facility] for routine progression of patient care       Report consisted of patient's Situation, Background, Assessment and   Recommendations(SBAR).     Information from the following report(s) ED SBAR, MAR, Recent Results, and Neuro Assessment was reviewed with the receiving nurse.    Lines:   Peripheral IV 08/09/24 Right Forearm (Active)   Site Assessment Clean, dry & intact 08/09/24 1845   Line Status Flushed;Brisk blood return 08/09/24 1845   Line Care Line pulled back 08/09/24 1845   Phlebitis Assessment No symptoms 08/09/24 1845   Infiltration Assessment 0 08/09/24 1845        Opportunity for questions and clarification was provided.      Patient transported with:  Tech

## 2024-08-10 NOTE — PROGRESS NOTES
Hospitalist Progress Note   Admit Date:  2024  4:17 PM   Name:  Rogerio Owens   Age:  88 y.o.  Sex:  male  :  1935   MRN:  [id number]   Room:  [facility]/    Presenting/Chief Complaint: Fall     Reason(s) for Admission: Anemia [D64.9]  Multiple falls [R29.6]  Symptomatic anemia [D64.9]  Closed fracture of sacrum, unspecified fracture morphology, initial encounter ([facility]) [S32.10XA]     Hospital Course:   88 y.o. male with GERD, LULU, HTN, left upper lobe cancer s/p resection with associated history of pulmonary embolism admitted on  due to generalized weakness with a fall at home and progressive fatigue over the previous 2 weeks.  He also admits to several episodes of dark stools, but none 3 days prior to admission.  He was found to have hemoglobin 6.1. Hemoccult negative    Subjective & 24hr Events:   He continues to feel weak and fatigued.  Denies bloody stools.  Denies melena.  Denies shortness of breath.  Denies chest pain.  Denies lightheadedness.      Assessment & Plan:     Principal Problem:    Symptomatic anemia  8/10 hemoglobin 6.1 with fatigue and weakness  Transfuse 1 unit PRBC  Trend hemoglobin every 12 hours  Check B12 + folate  Check iron studies  Check reticulocyte  Transfuse hemoglobin <7.0  Appreciate gastroenterology's input and assistance    Active Problems:    Essential hypertension  No acute issues  Hold losartan 50 mg daily      History of pulmonary embolus (PE)  Has been on Pradaxa since pulmonary embolism when he had lung cancer  Hold Pradaxa => may discuss coming off indefinitely      GERD (gastroesophageal reflux disease)  Continue famotidine 20 mg twice daily      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  Therapy evals ordered  Diet:  ADULT DIET; Regular; No Added Salt (3-4 gm); No

## 2024-08-10 NOTE — H&P
[facility]    Patient: Rogerio Owens Date: 8/9/2024  male, 88 y.o.  Admit Date: 8/9/2024  Attending: Adal Doulgas MD     ASSESSMENT AND PLAN:     Principal Problem:    Anemia  Plan: Hemoccult negative, possibly GI bleed. NPO after midnight. IVF. Trend hemoglobin. IV Protonix. Consult gastroenterology.  Active Problems:    Essential hypertension  Plan: Stable. Continue home meds.    GERD (gastroesophageal reflux disease)  Plan: Stable. Continue home meds.    HLD (hyperlipidemia)  Plan: Stable. Continue home meds.         DVT Prophylaxis: SCDs      Code Status: FULL CODE      Disposition: Observe on med/surg for evaluation and treatment as per above.      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)    Nontraumatic tear of left rotator cuff    Acute hypoxemic respiratory failure (HCC)    HLD (hyperlipidemia)    PNA (pneumonia)    Acute pulmonary embolism, unspecified pulmonary embolism type, unspecified whether acute cor pulmonale present (HCC)    Hypoxia    Diarrhea    Intractable vomiting with nausea    Lung mass    Pancytopenia (HCC)    Pneumonia due to COVID-19 virus    Acute cystitis with hematuria    Acute prostatitis    Prostatitis    Soft tissue mass    Vertigo    Stroke-like symptoms    History of pulmonary embolism    Pre-diabetes    Spinal stenosis of lumbar region with neurogenic claudication    Difficulty in walking    Degenerative disc disease, lumbar    Anemia    Lower extremity edema    Pain syndrome, chronic    LULU (obstructive sleep apnea)       Rogerio Owens is a 88 y.o. male, with a history of  has a past

## 2024-08-10 NOTE — PROGRESS NOTES
Patients bed exit alarm was going off and patient was found by staff in a kneeling position by his bed. Patient denies falling but instead states he was crawling out of bed to look for his cell phone. Patient denies hitting his head or knees. Denies pain. No obvious signs of trauma noted except a minor scrape to L knee. MD notified. Awaiting response. Patient returned to bed with safety measures in place.    2315  Orders received for VSC. No further orders at this time.

## 2024-08-11 PROBLEM — D50.9 IRON DEFICIENCY ANEMIA: Status: ACTIVE | Noted: 2024-08-11

## 2024-08-11 LAB
ABO + RH BLD: NORMAL
ALBUMIN SERPL-MCNC: 2.3 G/DL (ref 3.2–4.6)
ALBUMIN/GLOB SERPL: 1 (ref 1–1.9)
ALP SERPL-CCNC: 85 U/L (ref 40–129)
ALT SERPL-CCNC: 9 U/L (ref 12–65)
ANION GAP SERPL CALC-SCNC: 11 MMOL/L (ref 9–18)
AST SERPL-CCNC: 19 U/L (ref 15–37)
BASOPHILS # BLD: 0 K/UL (ref 0–0.2)
BASOPHILS NFR BLD: 1 % (ref 0–2)
BILIRUB SERPL-MCNC: 0.9 MG/DL (ref 0–1.2)
BLD PROD TYP BPU: NORMAL
BLOOD BANK BLOOD PRODUCT EXPIRATION DATE: NORMAL
BLOOD BANK DISPENSE STATUS: NORMAL
BLOOD BANK ISBT PRODUCT BLOOD TYPE: 6200
BLOOD BANK PRODUCT CODE: NORMAL
BLOOD BANK UNIT TYPE AND RH: NORMAL
BLOOD GROUP ANTIBODIES SERPL: NORMAL
BPU ID: NORMAL
BUN SERPL-MCNC: 11 MG/DL (ref 8–23)
CALCIUM SERPL-MCNC: 8 MG/DL (ref 8.8–10.2)
CHLORIDE SERPL-SCNC: 105 MMOL/L (ref 98–107)
CO2 SERPL-SCNC: 24 MMOL/L (ref 20–28)
CREAT SERPL-MCNC: 0.74 MG/DL (ref 0.8–1.3)
CROSSMATCH RESULT: NORMAL
DIFFERENTIAL METHOD BLD: ABNORMAL
EOSINOPHIL # BLD: 0.6 K/UL (ref 0–0.8)
EOSINOPHIL NFR BLD: 11 % (ref 0.5–7.8)
ERYTHROCYTE [DISTWIDTH] IN BLOOD BY AUTOMATED COUNT: 15.7 % (ref 11.9–14.6)
GLOBULIN SER CALC-MCNC: 2.4 G/DL (ref 2.3–3.5)
GLUCOSE SERPL-MCNC: 92 MG/DL (ref 70–99)
HCT VFR BLD AUTO: 25.2 % (ref 41.1–50.3)
HGB BLD-MCNC: 7.7 G/DL (ref 13.6–17.2)
IMM GRANULOCYTES # BLD AUTO: 0 K/UL (ref 0–0.5)
IMM GRANULOCYTES NFR BLD AUTO: 0 % (ref 0–5)
LYMPHOCYTES # BLD: 1.1 K/UL (ref 0.5–4.6)
LYMPHOCYTES NFR BLD: 21 % (ref 13–44)
MCH RBC QN AUTO: 28.4 PG (ref 26.1–32.9)
MCHC RBC AUTO-ENTMCNC: 30.6 G/DL (ref 31.4–35)
MCV RBC AUTO: 93 FL (ref 82–102)
MONOCYTES # BLD: 0.5 K/UL (ref 0.1–1.3)
MONOCYTES NFR BLD: 10 % (ref 4–12)
NEUTS SEG # BLD: 2.9 K/UL (ref 1.7–8.2)
NEUTS SEG NFR BLD: 57 % (ref 43–78)
NRBC # BLD: 0 K/UL (ref 0–0.2)
PLATELET # BLD AUTO: 278 K/UL (ref 150–450)
PMV BLD AUTO: 9.3 FL (ref 9.4–12.3)
POTASSIUM SERPL-SCNC: 3.6 MMOL/L (ref 3.5–5.1)
PROT SERPL-MCNC: 4.7 G/DL (ref 6.3–8.2)
RBC # BLD AUTO: 2.71 M/UL (ref 4.23–5.6)
SODIUM SERPL-SCNC: 140 MMOL/L (ref 136–145)
SPECIMEN EXP DATE BLD: NORMAL
UNIT DIVISION: 0
UNIT ISSUE DATE/TIME: NORMAL
WBC # BLD AUTO: 5.2 K/UL (ref 4.3–11.1)

## 2024-08-11 PROCEDURE — 97530 THERAPEUTIC ACTIVITIES: CPT

## 2024-08-11 PROCEDURE — 97161 PT EVAL LOW COMPLEX 20 MIN: CPT

## 2024-08-11 PROCEDURE — 2580000003 HC RX 258: Performed by: FAMILY MEDICINE

## 2024-08-11 PROCEDURE — 6370000000 HC RX 637 (ALT 250 FOR IP): Performed by: INTERNAL MEDICINE

## 2024-08-11 PROCEDURE — 85025 COMPLETE CBC W/AUTO DIFF WBC: CPT

## 2024-08-11 PROCEDURE — G0378 HOSPITAL OBSERVATION PER HR: HCPCS

## 2024-08-11 PROCEDURE — 96376 TX/PRO/DX INJ SAME DRUG ADON: CPT

## 2024-08-11 PROCEDURE — 36415 COLL VENOUS BLD VENIPUNCTURE: CPT

## 2024-08-11 PROCEDURE — 6360000002 HC RX W HCPCS: Performed by: FAMILY MEDICINE

## 2024-08-11 PROCEDURE — 6370000000 HC RX 637 (ALT 250 FOR IP): Performed by: FAMILY MEDICINE

## 2024-08-11 PROCEDURE — 80053 COMPREHEN METABOLIC PANEL: CPT

## 2024-08-11 RX ORDER — GUAIFENESIN/DEXTROMETHORPHAN 100-10MG/5
10 SYRUP ORAL EVERY 4 HOURS PRN
Status: DISCONTINUED | OUTPATIENT
Start: 2024-08-11 | End: 2024-08-12 | Stop reason: HOSPADM

## 2024-08-11 RX ORDER — PANTOPRAZOLE SODIUM 40 MG/1
40 TABLET, DELAYED RELEASE ORAL
Status: DISCONTINUED | OUTPATIENT
Start: 2024-08-11 | End: 2024-08-12 | Stop reason: HOSPADM

## 2024-08-11 RX ORDER — FERROUS GLUCONATE 324(38)MG
324 TABLET ORAL 2 TIMES DAILY WITH MEALS
Status: DISCONTINUED | OUTPATIENT
Start: 2024-08-11 | End: 2024-08-12 | Stop reason: HOSPADM

## 2024-08-11 RX ADMIN — ATORVASTATIN CALCIUM 80 MG: 40 TABLET, FILM COATED ORAL at 20:43

## 2024-08-11 RX ADMIN — SODIUM CHLORIDE, PRESERVATIVE FREE 10 ML: 5 INJECTION INTRAVENOUS at 20:46

## 2024-08-11 RX ADMIN — GUAIFENESIN AND DEXTROMETHORPHAN 10 ML: 100; 10 SYRUP ORAL at 12:11

## 2024-08-11 RX ADMIN — OXYCODONE HYDROCHLORIDE AND ACETAMINOPHEN 1 TABLET: 5; 325 TABLET ORAL at 20:43

## 2024-08-11 RX ADMIN — SODIUM CHLORIDE: 9 INJECTION, SOLUTION INTRAVENOUS at 04:49

## 2024-08-11 RX ADMIN — FAMOTIDINE 20 MG: 20 TABLET, FILM COATED ORAL at 09:39

## 2024-08-11 RX ADMIN — PANTOPRAZOLE SODIUM 40 MG: 40 INJECTION, POWDER, FOR SOLUTION INTRAVENOUS at 09:39

## 2024-08-11 RX ADMIN — FERROUS GLUCONATE 324 MG: 324 TABLET ORAL at 09:39

## 2024-08-11 RX ADMIN — SODIUM CHLORIDE, PRESERVATIVE FREE 10 ML: 5 INJECTION INTRAVENOUS at 09:40

## 2024-08-11 RX ADMIN — PANTOPRAZOLE SODIUM 40 MG: 40 TABLET, DELAYED RELEASE ORAL at 16:30

## 2024-08-11 RX ADMIN — FERROUS GLUCONATE 324 MG: 324 TABLET ORAL at 16:30

## 2024-08-11 RX ADMIN — CETIRIZINE HYDROCHLORIDE 5 MG: 5 TABLET ORAL at 09:39

## 2024-08-11 RX ADMIN — FUROSEMIDE 20 MG: 20 TABLET ORAL at 09:39

## 2024-08-11 ASSESSMENT — PAIN DESCRIPTION - DESCRIPTORS: DESCRIPTORS: ACHING

## 2024-08-11 ASSESSMENT — PAIN SCALES - GENERAL: PAINLEVEL_OUTOF10: 4

## 2024-08-11 ASSESSMENT — PAIN - FUNCTIONAL ASSESSMENT: PAIN_FUNCTIONAL_ASSESSMENT: PREVENTS OR INTERFERES SOME ACTIVE ACTIVITIES AND ADLS

## 2024-08-11 ASSESSMENT — PAIN DESCRIPTION - LOCATION: LOCATION: GENERALIZED

## 2024-08-11 NOTE — PROGRESS NOTES
ACUTE PHYSICAL THERAPY GOALS:   (Developed with and agreed upon by patient and/or caregiver.)  STG:  (1.)Mr. Owens will move from supine to sit and sit to supine  with MINIMAL ASSIST within 4 treatment day(s).    (2.)Mr. Owens will transfer from bed to chair and chair to bed with MINIMAL ASSIST using the least restrictive device within 4 treatment day(s).    (3.)Mr. Owens will ambulate with MINIMAL ASSIST for 10 feet with the least restrictive device within 4 treatment day(s).     LTG:  (1.)Mr. Owens will move from supine to sit and sit to supine  in bed with CONTACT GUARD ASSIST within 7 treatment day(s).    (2.)Mr. Owens will transfer from bed to chair and chair to bed with CONTACT GUARD ASSIST using the least restrictive device within 7 treatment day(s).    (3.)Mr. Owens will ambulate with CONTACT GUARD ASSIST for 10 feet with the least restrictive device within 7 treatment day(s).  ________________________________________________________________________________________________      PHYSICAL THERAPY Initial Assessment  (Link to Caseload Tracking: PT Visit Days : 1  Acknowledge Orders  Time In/Out  PT Charge Capture  Rehab Caseload Tracker    Rogerio Owens is a 88 y.o. male   PRIMARY DIAGNOSIS: Symptomatic anemia  Anemia [D64.9]  Multiple falls [R29.6]  Symptomatic anemia [D64.9]  Closed fracture of sacrum, unspecified fracture morphology, initial encounter ([facility]) [S32.10XA]       Reason for Referral: Generalized Muscle Weakness (M62.81)  Other lack of cordination (R27.8)  Difficulty in walking, Not elsewhere classified (R26.2)  Observation: Payor: MEDICARE / Plan: MEDICARE PART A AND B / Product Type: *No Product type* /     ASSESSMENT:     REHAB RECOMMENDATIONS:   Recommendation to date pending progress:  Setting:  Home Health Therapy    Equipment:    None     ASSESSMENT:  Mr. Roman presents with above diagnoses.  Patient brought to the ER after a fall at home.  He has had multiple

## 2024-08-11 NOTE — PROGRESS NOTES
Hospitalist Progress Note   Admit Date:  2024  4:17 PM   Name:  Rogerio Owens   Age:  88 y.o.  Sex:  male  :  1935   MRN:  [id number]   Room:  [facility]/    Presenting/Chief Complaint: Fall     Reason(s) for Admission: Anemia [D64.9]  Multiple falls [R29.6]  Symptomatic anemia [D64.9]  Closed fracture of sacrum, unspecified fracture morphology, initial encounter ([facility]) [S32.10XA]     Hospital Course:   88 y.o. male with GERD, LULU, HTN, left upper lobe cancer s/p resection with associated history of pulmonary embolism admitted on  due to generalized weakness with a fall at home and progressive fatigue over the previous 2 weeks.  He also admits to several episodes of dark stools, but none 3 days prior to admission.  He was found to have hemoglobin 6.1. Hemoccult negative    Subjective & 24hr Events:   He continues to feel weak and fatigued.  Denies bloody stools.  Denies melena.  Denies shortness of breath.  Denies chest pain.  Denies lightheadedness.      Assessment & Plan:     Principal Problem:    Symptomatic anemia  8/10 hemoglobin 6.1 with fatigue and weakness => Transfuse 1 unit PRBC   hemoglobin 7.7  Trend hemoglobin every 12 hours  B12 and folate elevated  Iron and ferritin low consistent with KENDALL  Reticulocyte count on the low end of normal  Transfuse hemoglobin <7.0  Appreciate gastroenterology's input and assistance    Active Problems:    Essential hypertension  No acute issues  Hold losartan 50 mg daily      History of pulmonary embolus (PE)  Has been on Pradaxa since pulmonary embolism when he had lung cancer  Hold Pradaxa => may discuss coming off indefinitely      GERD (gastroesophageal reflux disease)  Continue famotidine 20 mg twice daily      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

## 2024-08-11 NOTE — PLAN OF CARE
Problem: Discharge Planning  Goal: Discharge to home or other facility with appropriate resources  8/10/2024 2232 by Mehreen Underwood RN  Outcome: Progressing  Flowsheets (Taken 8/10/2024 2006)  Discharge to home or other facility with appropriate resources: Identify discharge learning needs (meds, wound care, etc)  8/10/2024 1626 by Doni Acevedo RN  Outcome: Progressing     Problem: Pain  Goal: Verbalizes/displays adequate comfort level or baseline comfort level  8/10/2024 2232 by Mehreen Underwood RN  Outcome: Progressing  8/10/2024 1626 by Doni Acevedo RN  Outcome: Progressing     Problem: Safety - Adult  Goal: Free from fall injury  8/10/2024 2232 by Mehreen Underwood RN  Outcome: Progressing  8/10/2024 1626 by Doni Acevedo RN  Outcome: Progressing     Problem: ABCDS Injury Assessment  Goal: Absence of physical injury  8/10/2024 2232 by Mehreen Underwood RN  Outcome: Progressing  8/10/2024 1626 by Doni Acevedo RN  Outcome: Progressing     Problem: Skin/Tissue Integrity  Goal: Absence of new skin breakdown  Description: 1.  Monitor for areas of redness and/or skin breakdown  2.  Assess vascular access sites hourly  3.  Every 4-6 hours minimum:  Change oxygen saturation probe site  4.  Every 4-6 hours:  If on nasal continuous positive airway pressure, respiratory therapy assess nares and determine need for appliance change or resting period.  8/10/2024 2232 by Mehreen Underwood RN  Outcome: Progressing  8/10/2024 1626 by Doni Acevedo RN  Outcome: Progressing

## 2024-08-12 VITALS
RESPIRATION RATE: 15 BRPM | SYSTOLIC BLOOD PRESSURE: 124 MMHG | BODY MASS INDEX: 28.93 KG/M2 | DIASTOLIC BLOOD PRESSURE: 80 MMHG | WEIGHT: 180 LBS | TEMPERATURE: 98.1 F | OXYGEN SATURATION: 95 % | HEIGHT: 66 IN | HEART RATE: 64 BPM

## 2024-08-12 PROBLEM — D64.9 SYMPTOMATIC ANEMIA: Status: ACTIVE | Noted: 2024-08-12

## 2024-08-12 PROBLEM — D64.9 SYMPTOMATIC ANEMIA: Status: RESOLVED | Noted: 2024-08-10 | Resolved: 2024-08-12

## 2024-08-12 LAB
ALBUMIN SERPL-MCNC: 2.7 G/DL (ref 3.2–4.6)
ALBUMIN/GLOB SERPL: 1 (ref 1–1.9)
ALP SERPL-CCNC: 100 U/L (ref 40–129)
ALT SERPL-CCNC: 12 U/L (ref 12–65)
ANION GAP SERPL CALC-SCNC: 11 MMOL/L (ref 9–18)
AST SERPL-CCNC: 18 U/L (ref 15–37)
BASOPHILS # BLD: 0.1 K/UL (ref 0–0.2)
BASOPHILS NFR BLD: 1 % (ref 0–2)
BILIRUB SERPL-MCNC: 0.6 MG/DL (ref 0–1.2)
BUN SERPL-MCNC: 11 MG/DL (ref 8–23)
CALCIUM SERPL-MCNC: 8.7 MG/DL (ref 8.8–10.2)
CHLORIDE SERPL-SCNC: 104 MMOL/L (ref 98–107)
CO2 SERPL-SCNC: 27 MMOL/L (ref 20–28)
CREAT SERPL-MCNC: 0.79 MG/DL (ref 0.8–1.3)
DIFFERENTIAL METHOD BLD: ABNORMAL
EOSINOPHIL # BLD: 0.8 K/UL (ref 0–0.8)
EOSINOPHIL NFR BLD: 11 % (ref 0.5–7.8)
ERYTHROCYTE [DISTWIDTH] IN BLOOD BY AUTOMATED COUNT: 15.5 % (ref 11.9–14.6)
GLOBULIN SER CALC-MCNC: 2.8 G/DL (ref 2.3–3.5)
GLUCOSE SERPL-MCNC: 88 MG/DL (ref 70–99)
HCT VFR BLD AUTO: 29.3 % (ref 41.1–50.3)
HGB BLD-MCNC: 9.1 G/DL (ref 13.6–17.2)
IMM GRANULOCYTES # BLD AUTO: 0 K/UL (ref 0–0.5)
IMM GRANULOCYTES NFR BLD AUTO: 0 % (ref 0–5)
LYMPHOCYTES # BLD: 1.4 K/UL (ref 0.5–4.6)
LYMPHOCYTES NFR BLD: 19 % (ref 13–44)
MCH RBC QN AUTO: 28.6 PG (ref 26.1–32.9)
MCHC RBC AUTO-ENTMCNC: 31.1 G/DL (ref 31.4–35)
MCV RBC AUTO: 92.1 FL (ref 82–102)
MONOCYTES # BLD: 0.7 K/UL (ref 0.1–1.3)
MONOCYTES NFR BLD: 10 % (ref 4–12)
NEUTS SEG # BLD: 4.2 K/UL (ref 1.7–8.2)
NEUTS SEG NFR BLD: 59 % (ref 43–78)
NRBC # BLD: 0 K/UL (ref 0–0.2)
PLATELET # BLD AUTO: 320 K/UL (ref 150–450)
PMV BLD AUTO: 9.6 FL (ref 9.4–12.3)
POTASSIUM SERPL-SCNC: 3.8 MMOL/L (ref 3.5–5.1)
PROT SERPL-MCNC: 5.5 G/DL (ref 6.3–8.2)
RBC # BLD AUTO: 3.18 M/UL (ref 4.23–5.6)
SODIUM SERPL-SCNC: 142 MMOL/L (ref 136–145)
WBC # BLD AUTO: 7.2 K/UL (ref 4.3–11.1)

## 2024-08-12 PROCEDURE — 2580000003 HC RX 258: Performed by: FAMILY MEDICINE

## 2024-08-12 PROCEDURE — 6370000000 HC RX 637 (ALT 250 FOR IP): Performed by: INTERNAL MEDICINE

## 2024-08-12 PROCEDURE — 80053 COMPREHEN METABOLIC PANEL: CPT

## 2024-08-12 PROCEDURE — 1100000000 HC RM PRIVATE

## 2024-08-12 PROCEDURE — 6370000000 HC RX 637 (ALT 250 FOR IP): Performed by: FAMILY MEDICINE

## 2024-08-12 PROCEDURE — G0378 HOSPITAL OBSERVATION PER HR: HCPCS

## 2024-08-12 PROCEDURE — 85025 COMPLETE CBC W/AUTO DIFF WBC: CPT

## 2024-08-12 PROCEDURE — 36415 COLL VENOUS BLD VENIPUNCTURE: CPT

## 2024-08-12 RX ORDER — PANTOPRAZOLE SODIUM 40 MG/1
40 TABLET, DELAYED RELEASE ORAL DAILY
Qty: 30 TABLET | Refills: 1 | Status: SHIPPED | OUTPATIENT
Start: 2024-08-12

## 2024-08-12 RX ORDER — FERROUS GLUCONATE 324(38)MG
324 TABLET ORAL 2 TIMES DAILY WITH MEALS
Qty: 30 TABLET | Refills: 3 | Status: SHIPPED | OUTPATIENT
Start: 2024-08-12

## 2024-08-12 RX ADMIN — PANTOPRAZOLE SODIUM 40 MG: 40 TABLET, DELAYED RELEASE ORAL at 05:45

## 2024-08-12 RX ADMIN — SODIUM CHLORIDE, PRESERVATIVE FREE 10 ML: 5 INJECTION INTRAVENOUS at 08:50

## 2024-08-12 RX ADMIN — FAMOTIDINE 20 MG: 20 TABLET, FILM COATED ORAL at 08:50

## 2024-08-12 RX ADMIN — FUROSEMIDE 20 MG: 20 TABLET ORAL at 08:50

## 2024-08-12 RX ADMIN — FERROUS GLUCONATE 324 MG: 324 TABLET ORAL at 08:50

## 2024-08-12 RX ADMIN — CETIRIZINE HYDROCHLORIDE 5 MG: 5 TABLET ORAL at 08:50

## 2024-08-12 RX ADMIN — OXYCODONE HYDROCHLORIDE AND ACETAMINOPHEN 1 TABLET: 5; 325 TABLET ORAL at 08:52

## 2024-08-12 ASSESSMENT — PAIN SCALES - GENERAL: PAINLEVEL_OUTOF10: 8

## 2024-08-12 ASSESSMENT — PAIN DESCRIPTION - LOCATION: LOCATION: ABDOMEN;SHOULDER

## 2024-08-12 NOTE — PLAN OF CARE
Problem: Discharge Planning  Goal: Discharge to home or other facility with appropriate resources  Outcome: Progressing  Flowsheets (Taken 8/11/2024 2041)  Discharge to home or other facility with appropriate resources: Identify discharge learning needs (meds, wound care, etc)     Problem: Pain  Goal: Verbalizes/displays adequate comfort level or baseline comfort level  Outcome: Progressing     Problem: Safety - Adult  Goal: Free from fall injury  Outcome: Progressing     Problem: ABCDS Injury Assessment  Goal: Absence of physical injury  Outcome: Progressing     Problem: Skin/Tissue Integrity  Goal: Absence of new skin breakdown  Description: 1.  Monitor for areas of redness and/or skin breakdown  2.  Assess vascular access sites hourly  3.  Every 4-6 hours minimum:  Change oxygen saturation probe site  4.  Every 4-6 hours:  If on nasal continuous positive airway pressure, respiratory therapy assess nares and determine need for appliance change or resting period.  Outcome: Progressing

## 2024-08-12 NOTE — DISCHARGE SUMMARY
Hospitalist Discharge Summary   Admit Date:  2024  4:17 PM   DC Note date: 2024  Name:  Rogerio Owens   Age:  88 y.o.  Sex:  male  :  1935   MRN:  [id number]   Room:  [facility]  PCP:  Marianne, Pcp    Presenting Complaint: Fall     Initial Admission Diagnosis: Anemia [D64.9]  Multiple falls [R29.6]  Symptomatic anemia [D64.9]  Closed fracture of sacrum, unspecified fracture morphology, initial encounter ([facility]) [S32.10XA]     Problem List for this Hospitalization (present on admission):    Principal Problem (Resolved):    Symptomatic anemia  Active Problems:    Essential hypertension    History of pulmonary embolus (PE)    GERD (gastroesophageal reflux disease)    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 male with GERD, LULU, HTN, left upper lobe cancer s/p resection with associated history of pulmonary embolism admitted on  due to generalized weakness with a fall at home and progressive fatigue over the previous 2 weeks.  He also admits to several episodes of dark stools, but none 3 days prior to admission.  He was found to have hemoglobin 6.1. Hemoccult negative in the ER.  He was admitted and given 1 unit packed red blood cells.  Gastroenterology was consulted who recommended conservative care.  His hemoglobin came up to 7.7 the next day.  Iron studies were consistent with iron deficiency anemia so he was started on ferrous gluconate twice daily.  His hemoglobin on 812 was 9.1 and he felt back to baseline.  PT/OT worked with him and recommended home with home health care.  He remained stable and was discharged home.    He was told to stop Pradaxa due to having the PE when he had stage I lung cancer, which has since been removed.    Disposition: Home with Home Health  Diet: ADULT DIET; Regular; No Added Salt (3-4 gm); No red dye; NOTHING

## 2024-08-12 NOTE — CARE COORDINATION
Patient with discharge orders for today.  Referral sent to interim home health for resumption of PT and OT services. No additional needs made known to CM. Patient has met all treatment goals and milestones for discharge. Family to provide transportation home. CM following until patient is discharged.        08/12/24 0907   Services At/After Discharge   Transition of Care Consult (CM Consult) Home Health   Internal Home Health No   Reason Outside Agency Chosen   (On services prior to admission)   Services At/After Discharge Home Health;PT   [place] Resource Information Provided? No   Mode of Transport at Discharge Other (see comment)  (Family)   Confirm Follow Up Transport Family   Condition of Participation: Discharge Planning   The Plan for Transition of Care is related to the following treatment goals: Patient to return to baseline level of function with assist of home health   The Patient and/or Patient Representative was provided with a Choice of Provider? Patient   The Patient and/Or Patient Representative agree with the Discharge Plan? Yes   Freedom of Choice list was provided with basic dialogue that supports the patient's individualized plan of care/goals, treatment preferences, and shares the quality data associated with the providers?  Yes

## 2024-08-12 NOTE — PLAN OF CARE
Problem: Discharge Planning  Goal: Discharge to home or other facility with appropriate resources  8/12/2024 1022 by Doni Acevedo RN  Outcome: Adequate for Discharge  8/11/2024 2308 by Mehreen Underwood RN  Outcome: Progressing  Flowsheets (Taken 8/11/2024 2041)  Discharge to home or other facility with appropriate resources: Identify discharge learning needs (meds, wound care, etc)     Problem: Pain  Goal: Verbalizes/displays adequate comfort level or baseline comfort level  8/12/2024 1022 by Doni Acevedo RN  Outcome: Adequate for Discharge  8/11/2024 2308 by Mehreen Underwood RN  Outcome: Progressing     Problem: Safety - Adult  Goal: Free from fall injury  8/12/2024 1022 by Doni Acevedo RN  Outcome: Adequate for Discharge  8/11/2024 2308 by Mehreen Underwood RN  Outcome: Progressing     Problem: ABCDS Injury Assessment  Goal: Absence of physical injury  8/12/2024 1022 by Doni Acevedo RN  Outcome: Adequate for Discharge  8/11/2024 2308 by Mehreen Underwood RN  Outcome: Progressing     Problem: Skin/Tissue Integrity  Goal: Absence of new skin breakdown  Description: 1.  Monitor for areas of redness and/or skin breakdown  2.  Assess vascular access sites hourly  3.  Every 4-6 hours minimum:  Change oxygen saturation probe site  4.  Every 4-6 hours:  If on nasal continuous positive airway pressure, respiratory therapy assess nares and determine need for appliance change or resting period.  8/12/2024 1022 by Doni Acevedo RN  Outcome: Adequate for Discharge  8/11/2024 2308 by Mehreen Underwood RN  Outcome: Progressing

## 2024-08-12 NOTE — WOUND CARE
Noted request for possible pressure ulcer on buttock. Recommend pink foam dressing until can be assessed. Continue diligent and frequent incontinence care and turning/ offloading. Float heels off pillows or use heel boots.

## 2024-08-13 ENCOUNTER — CARE COORDINATION (OUTPATIENT)
Dept: CARE COORDINATION | Facility: CLINIC | Age: 89
End: 2024-08-13

## 2024-08-13 NOTE — CARE COORDINATION
Care Transitions Note    Initial Call - Call within 2 business days of discharge: Yes    Patient Current Location:  Home: [address] [place] [address] [place] [address]    Care Transition Nurse contacted the spouse/partner  by telephone to perform post hospital discharge assessment, verified name and  as identifiers. Provided introduction to self, and explanation of the Care Transition Nurse role.     Patient: Rogerio Owens    Patient : 1935   MRN: [id number]    Reason for Admission: symptomatic anemia  Discharge Date: 24  RURS: Readmission Risk Score: 20.3      Last Discharge Facility       Date Complaint Diagnosis Description Type Department Provider    24 Fall Symptomatic anemia ... ED to Hosp-Admission (Discharged) (ADMITTED) [facility] Itzel De Jesus, DO; Khadar,...            Was this an external facility discharge? No    Additional needs identified to be addressed with provider   No needs identified             Method of communication with provider: none.    Patients top risk factors for readmission: functional physical ability and medical condition-symptomatic anemia    Interventions to address risk factors:   Reviewed discharge instructions and offered opportunity to ask any questions regarding discharge instructions.  Confirmed medications obtained and taking as ordered  Reviewed upcoming follow up appointments:  PCP Dr. Payam Gonzalez-2024 at 3:00 pm  [facility] visits for [facility] and to obtain electric wheelchair with assistance of building a ramp  [facility] Aide 3 times weekly  [facility] to resume HH visits. Office to call to schedule visit for resumption of care    Care Transition Nurse reviewed discharge instructions, medical action plan, and red flags with spouse/partner. The spouse/partner was given an opportunity to ask questions; no further questions or concerns at this time.. The spouse/partner verbalized understanding.   Were discharge instructions available to patient? Yes.

## 2024-08-20 ENCOUNTER — HOSPITAL ENCOUNTER (INPATIENT)
Age: 89
LOS: 1 days | Discharge: ANOTHER HOSPITAL | DRG: 057 | End: 2024-08-21
Attending: EMERGENCY MEDICINE | Admitting: INTERNAL MEDICINE
Payer: OTHER GOVERNMENT

## 2024-08-20 ENCOUNTER — CARE COORDINATION (OUTPATIENT)
Dept: CARE COORDINATION | Facility: CLINIC | Age: 89
End: 2024-08-20

## 2024-08-20 ENCOUNTER — APPOINTMENT (OUTPATIENT)
Dept: GENERAL RADIOLOGY | Age: 89
DRG: 057 | End: 2024-08-20
Payer: OTHER GOVERNMENT

## 2024-08-20 ENCOUNTER — APPOINTMENT (OUTPATIENT)
Dept: CT IMAGING | Age: 89
DRG: 057 | End: 2024-08-20
Payer: OTHER GOVERNMENT

## 2024-08-20 DIAGNOSIS — R53.1 GENERALIZED WEAKNESS: ICD-10-CM

## 2024-08-20 DIAGNOSIS — R40.4 TRANSIENT ALTERATION OF AWARENESS: ICD-10-CM

## 2024-08-20 DIAGNOSIS — N39.0 URINARY TRACT INFECTION WITHOUT HEMATURIA, SITE UNSPECIFIED: Primary | ICD-10-CM

## 2024-08-20 PROBLEM — G91.2 NPH (NORMAL PRESSURE HYDROCEPHALUS) (HCC): Status: ACTIVE | Noted: 2024-08-20

## 2024-08-20 LAB
ABO + RH BLD: NORMAL
ALBUMIN SERPL-MCNC: 2.7 G/DL (ref 3.2–4.6)
ALBUMIN/GLOB SERPL: 0.9 (ref 1–1.9)
ALP SERPL-CCNC: 120 U/L (ref 40–129)
ALT SERPL-CCNC: 10 U/L (ref 12–65)
ANION GAP SERPL CALC-SCNC: 11 MMOL/L (ref 9–18)
APPEARANCE UR: ABNORMAL
AST SERPL-CCNC: 24 U/L (ref 15–37)
BACTERIA URNS QL MICRO: ABNORMAL /HPF
BASOPHILS # BLD: 0.1 K/UL (ref 0–0.2)
BASOPHILS NFR BLD: 1 % (ref 0–2)
BILIRUB SERPL-MCNC: 0.5 MG/DL (ref 0–1.2)
BILIRUB UR QL: NEGATIVE
BLOOD GROUP ANTIBODIES SERPL: NORMAL
BUN SERPL-MCNC: 19 MG/DL (ref 8–23)
CALCIUM SERPL-MCNC: 8.7 MG/DL (ref 8.8–10.2)
CHLORIDE SERPL-SCNC: 103 MMOL/L (ref 98–107)
CO2 SERPL-SCNC: 25 MMOL/L (ref 20–28)
COLOR UR: ABNORMAL
CREAT SERPL-MCNC: 0.8 MG/DL (ref 0.8–1.3)
CRYSTALS URNS QL MICRO: ABNORMAL /LPF
DIFFERENTIAL METHOD BLD: ABNORMAL
EKG ATRIAL RATE: 65 BPM
EKG DIAGNOSIS: NORMAL
EKG P AXIS: 35 DEGREES
EKG P-R INTERVAL: 177 MS
EKG Q-T INTERVAL: 411 MS
EKG QRS DURATION: 85 MS
EKG QTC CALCULATION (BAZETT): 424 MS
EKG R AXIS: 16 DEGREES
EKG T AXIS: 1 DEGREES
EKG VENTRICULAR RATE: 64 BPM
EOSINOPHIL # BLD: 0.6 K/UL (ref 0–0.8)
EOSINOPHIL NFR BLD: 7 % (ref 0.5–7.8)
EPI CELLS #/AREA URNS HPF: ABNORMAL /HPF
ERYTHROCYTE [DISTWIDTH] IN BLOOD BY AUTOMATED COUNT: 18.1 % (ref 11.9–14.6)
GLOBULIN SER CALC-MCNC: 3.1 G/DL (ref 2.3–3.5)
GLUCOSE SERPL-MCNC: 97 MG/DL (ref 70–99)
GLUCOSE UR STRIP.AUTO-MCNC: NEGATIVE MG/DL
HCT VFR BLD AUTO: 29.9 % (ref 41.1–50.3)
HGB BLD-MCNC: 9 G/DL (ref 13.6–17.2)
HGB UR QL STRIP: ABNORMAL
IMM GRANULOCYTES # BLD AUTO: 0 K/UL (ref 0–0.5)
IMM GRANULOCYTES NFR BLD AUTO: 0 % (ref 0–5)
KETONES UR QL STRIP.AUTO: NEGATIVE MG/DL
LACTATE SERPL-SCNC: 1 MMOL/L (ref 0.5–2)
LEUKOCYTE ESTERASE UR QL STRIP.AUTO: ABNORMAL
LYMPHOCYTES # BLD: 0.9 K/UL (ref 0.5–4.6)
LYMPHOCYTES NFR BLD: 11 % (ref 13–44)
MCH RBC QN AUTO: 28.5 PG (ref 26.1–32.9)
MCHC RBC AUTO-ENTMCNC: 30.1 G/DL (ref 31.4–35)
MCV RBC AUTO: 94.6 FL (ref 82–102)
MONOCYTES # BLD: 0.6 K/UL (ref 0.1–1.3)
MONOCYTES NFR BLD: 8 % (ref 4–12)
MUCOUS THREADS URNS QL MICRO: ABNORMAL /LPF
NEUTS SEG # BLD: 6 K/UL (ref 1.7–8.2)
NEUTS SEG NFR BLD: 73 % (ref 43–78)
NITRITE UR QL STRIP.AUTO: POSITIVE
NRBC # BLD: 0 K/UL (ref 0–0.2)
OTHER OBSERVATIONS: ABNORMAL
PH UR STRIP: 8.5 (ref 5–9)
PLATELET # BLD AUTO: 276 K/UL (ref 150–450)
PMV BLD AUTO: 9.4 FL (ref 9.4–12.3)
POTASSIUM SERPL-SCNC: 4.8 MMOL/L (ref 3.5–5.1)
PROCALCITONIN SERPL-MCNC: 0.03 NG/ML (ref 0–0.1)
PROT SERPL-MCNC: 5.8 G/DL (ref 6.3–8.2)
PROT UR STRIP-MCNC: 30 MG/DL
RBC # BLD AUTO: 3.16 M/UL (ref 4.23–5.6)
RBC #/AREA URNS HPF: ABNORMAL /HPF
SODIUM SERPL-SCNC: 139 MMOL/L (ref 136–145)
SP GR UR REFRACTOMETRY: 1.02 (ref 1–1.02)
SPECIMEN EXP DATE BLD: NORMAL
UROBILINOGEN UR QL STRIP.AUTO: 1 EU/DL (ref 0.2–1)
WBC # BLD AUTO: 8.2 K/UL (ref 4.3–11.1)
WBC URNS QL MICRO: ABNORMAL /HPF

## 2024-08-20 PROCEDURE — 6360000002 HC RX W HCPCS: Performed by: INTERNAL MEDICINE

## 2024-08-20 PROCEDURE — 86900 BLOOD TYPING SEROLOGIC ABO: CPT

## 2024-08-20 PROCEDURE — 70450 CT HEAD/BRAIN W/O DYE: CPT

## 2024-08-20 PROCEDURE — 87086 URINE CULTURE/COLONY COUNT: CPT

## 2024-08-20 PROCEDURE — 71045 X-RAY EXAM CHEST 1 VIEW: CPT

## 2024-08-20 PROCEDURE — 80053 COMPREHEN METABOLIC PANEL: CPT

## 2024-08-20 PROCEDURE — 87186 SC STD MICRODIL/AGAR DIL: CPT

## 2024-08-20 PROCEDURE — 84145 PROCALCITONIN (PCT): CPT

## 2024-08-20 PROCEDURE — 6370000000 HC RX 637 (ALT 250 FOR IP): Performed by: INTERNAL MEDICINE

## 2024-08-20 PROCEDURE — 81001 URINALYSIS AUTO W/SCOPE: CPT

## 2024-08-20 PROCEDURE — 83605 ASSAY OF LACTIC ACID: CPT

## 2024-08-20 PROCEDURE — 2580000003 HC RX 258: Performed by: INTERNAL MEDICINE

## 2024-08-20 PROCEDURE — 96374 THER/PROPH/DIAG INJ IV PUSH: CPT

## 2024-08-20 PROCEDURE — 2580000003 HC RX 258: Performed by: EMERGENCY MEDICINE

## 2024-08-20 PROCEDURE — 85025 COMPLETE CBC W/AUTO DIFF WBC: CPT

## 2024-08-20 PROCEDURE — 99285 EMERGENCY DEPT VISIT HI MDM: CPT

## 2024-08-20 PROCEDURE — 6360000002 HC RX W HCPCS: Performed by: EMERGENCY MEDICINE

## 2024-08-20 PROCEDURE — 86901 BLOOD TYPING SEROLOGIC RH(D): CPT

## 2024-08-20 PROCEDURE — 87088 URINE BACTERIA CULTURE: CPT

## 2024-08-20 PROCEDURE — 1100000000 HC RM PRIVATE

## 2024-08-20 PROCEDURE — 93005 ELECTROCARDIOGRAM TRACING: CPT | Performed by: EMERGENCY MEDICINE

## 2024-08-20 PROCEDURE — 86850 RBC ANTIBODY SCREEN: CPT

## 2024-08-20 RX ORDER — ONDANSETRON 2 MG/ML
4 INJECTION INTRAMUSCULAR; INTRAVENOUS EVERY 6 HOURS PRN
Status: DISCONTINUED | OUTPATIENT
Start: 2024-08-20 | End: 2024-08-21 | Stop reason: HOSPADM

## 2024-08-20 RX ORDER — OXYCODONE HYDROCHLORIDE AND ACETAMINOPHEN 5; 325 MG/1; MG/1
1 TABLET ORAL EVERY 12 HOURS PRN
Status: DISCONTINUED | OUTPATIENT
Start: 2024-08-20 | End: 2024-08-21 | Stop reason: HOSPADM

## 2024-08-20 RX ORDER — SODIUM CHLORIDE 0.9 % (FLUSH) 0.9 %
5-40 SYRINGE (ML) INJECTION EVERY 12 HOURS SCHEDULED
Status: DISCONTINUED | OUTPATIENT
Start: 2024-08-20 | End: 2024-08-21 | Stop reason: HOSPADM

## 2024-08-20 RX ORDER — ACETAMINOPHEN 650 MG/1
650 SUPPOSITORY RECTAL EVERY 6 HOURS PRN
Status: DISCONTINUED | OUTPATIENT
Start: 2024-08-20 | End: 2024-08-21 | Stop reason: HOSPADM

## 2024-08-20 RX ORDER — ESCITALOPRAM OXALATE 10 MG/1
20 TABLET ORAL DAILY
Status: DISCONTINUED | OUTPATIENT
Start: 2024-08-20 | End: 2024-08-21 | Stop reason: HOSPADM

## 2024-08-20 RX ORDER — ACETAMINOPHEN 325 MG/1
650 TABLET ORAL EVERY 6 HOURS PRN
Status: DISCONTINUED | OUTPATIENT
Start: 2024-08-20 | End: 2024-08-21 | Stop reason: HOSPADM

## 2024-08-20 RX ORDER — POTASSIUM CHLORIDE 7.45 MG/ML
10 INJECTION INTRAVENOUS PRN
Status: DISCONTINUED | OUTPATIENT
Start: 2024-08-20 | End: 2024-08-21 | Stop reason: HOSPADM

## 2024-08-20 RX ORDER — POTASSIUM CHLORIDE 20 MEQ/1
40 TABLET, EXTENDED RELEASE ORAL PRN
Status: DISCONTINUED | OUTPATIENT
Start: 2024-08-20 | End: 2024-08-21 | Stop reason: HOSPADM

## 2024-08-20 RX ORDER — POLYETHYLENE GLYCOL 3350 17 G/17G
17 POWDER, FOR SOLUTION ORAL DAILY PRN
Status: DISCONTINUED | OUTPATIENT
Start: 2024-08-20 | End: 2024-08-21 | Stop reason: HOSPADM

## 2024-08-20 RX ORDER — SODIUM CHLORIDE 9 MG/ML
INJECTION, SOLUTION INTRAVENOUS PRN
Status: DISCONTINUED | OUTPATIENT
Start: 2024-08-20 | End: 2024-08-21 | Stop reason: HOSPADM

## 2024-08-20 RX ORDER — BUPROPION HYDROCHLORIDE 75 MG/1
75 TABLET ORAL DAILY
Status: DISCONTINUED | OUTPATIENT
Start: 2024-08-21 | End: 2024-08-21 | Stop reason: HOSPADM

## 2024-08-20 RX ORDER — FUROSEMIDE 20 MG/1
20 TABLET ORAL DAILY
Status: DISCONTINUED | OUTPATIENT
Start: 2024-08-20 | End: 2024-08-21 | Stop reason: HOSPADM

## 2024-08-20 RX ORDER — PANTOPRAZOLE SODIUM 40 MG/1
40 TABLET, DELAYED RELEASE ORAL DAILY
Status: DISCONTINUED | OUTPATIENT
Start: 2024-08-21 | End: 2024-08-21 | Stop reason: HOSPADM

## 2024-08-20 RX ORDER — CEFDINIR 300 MG/1
300 CAPSULE ORAL 2 TIMES DAILY
Qty: 28 CAPSULE | Refills: 0 | Status: ON HOLD | OUTPATIENT
Start: 2024-08-20 | End: 2024-09-03

## 2024-08-20 RX ORDER — ATORVASTATIN CALCIUM 40 MG/1
40 TABLET, FILM COATED ORAL DAILY
Status: DISCONTINUED | OUTPATIENT
Start: 2024-08-20 | End: 2024-08-21 | Stop reason: HOSPADM

## 2024-08-20 RX ORDER — LOSARTAN POTASSIUM 50 MG/1
50 TABLET ORAL DAILY
Status: DISCONTINUED | OUTPATIENT
Start: 2024-08-21 | End: 2024-08-21 | Stop reason: HOSPADM

## 2024-08-20 RX ORDER — ONDANSETRON 4 MG/1
4 TABLET, ORALLY DISINTEGRATING ORAL EVERY 8 HOURS PRN
Status: DISCONTINUED | OUTPATIENT
Start: 2024-08-20 | End: 2024-08-21 | Stop reason: HOSPADM

## 2024-08-20 RX ORDER — ENOXAPARIN SODIUM 100 MG/ML
40 INJECTION SUBCUTANEOUS EVERY 24 HOURS
Status: DISCONTINUED | OUTPATIENT
Start: 2024-08-20 | End: 2024-08-21 | Stop reason: HOSPADM

## 2024-08-20 RX ORDER — MAGNESIUM SULFATE IN WATER 40 MG/ML
2000 INJECTION, SOLUTION INTRAVENOUS PRN
Status: DISCONTINUED | OUTPATIENT
Start: 2024-08-20 | End: 2024-08-21 | Stop reason: HOSPADM

## 2024-08-20 RX ORDER — SODIUM CHLORIDE 0.9 % (FLUSH) 0.9 %
5-40 SYRINGE (ML) INJECTION PRN
Status: DISCONTINUED | OUTPATIENT
Start: 2024-08-20 | End: 2024-08-21 | Stop reason: HOSPADM

## 2024-08-20 RX ADMIN — ATORVASTATIN CALCIUM 40 MG: 40 TABLET, FILM COATED ORAL at 21:20

## 2024-08-20 RX ADMIN — FUROSEMIDE 20 MG: 20 TABLET ORAL at 21:20

## 2024-08-20 RX ADMIN — WATER 1000 MG: 1 INJECTION INTRAMUSCULAR; INTRAVENOUS; SUBCUTANEOUS at 15:45

## 2024-08-20 RX ADMIN — ESCITALOPRAM OXALATE 20 MG: 10 TABLET ORAL at 21:21

## 2024-08-20 RX ADMIN — SODIUM CHLORIDE, PRESERVATIVE FREE 10 ML: 5 INJECTION INTRAVENOUS at 21:19

## 2024-08-20 RX ADMIN — ENOXAPARIN SODIUM 40 MG: 100 INJECTION SUBCUTANEOUS at 21:19

## 2024-08-20 ASSESSMENT — PAIN SCALES - GENERAL: PAINLEVEL_OUTOF10: 5

## 2024-08-20 ASSESSMENT — PAIN - FUNCTIONAL ASSESSMENT: PAIN_FUNCTIONAL_ASSESSMENT: 0-10

## 2024-08-20 NOTE — ED PROVIDER NOTES
input(s): \"COVID19\" in the last 72 hours.    Voice dictation software was used during the making of this note.  This software is not perfect and grammatical and other typographical errors may be present.  This note has not been completely proofread for errors.     Kehinde Santa MD  08/20/24 3984

## 2024-08-20 NOTE — H&P
prior study with no CT evidence of acute intracranial abnormality. Stable chronic periventricular and deep white matter small vessel ischemic changes. Stable dilated ventricles. This could represent hydrocephalus. Electronically signed by ANKUR THOMAS    XR CHEST 1 VIEW    Result Date: 8/20/2024  Chest X-ray INDICATION: Increased weakness, altered mental status AP/PA view of the chest was obtained. Comparison: 2/23/2023 FINDINGS: There are 2 adjacent nodular opacities overlying the right lung base peripherally measure up to 1.2 cm. Potentially 1 of these could represent a rib end. However, chest CT suggested for further evaluation. The lungs are otherwise clear. The heart is nonenlarged. There is no effusion or pneumothorax. No acute osseous abnormalities are evident.     Suspicion of right lung nodules. Chest CT suggested in further evaluation. Electronically signed by Payam Perry        Signed:  ELENA THOMPSON DO    Part of this note may have been written by using a voice dictation software.  The note has been proof read but may still contain some grammatical/other typographical errors.

## 2024-08-20 NOTE — ED NOTES
Pt states he is unable to ambulate or pivot in bed. Pt family refusing for nurse to ambulate pt. Dr. Santa aware and notified.

## 2024-08-20 NOTE — ED NOTES
Pt wife and son states pt fell three times about  2 days ago. Pt family states there was no hitting of head and no LOC. Pt wife states he was taken off of blood thinner medication on Tuesday 8/17. Dr. Santa aware and notified.

## 2024-08-20 NOTE — ED TRIAGE NOTES
Patient brought into ER via EMS coming from home. EMS states patient c\o increased weakness and altered mental status. States admitted recently for low hemoglobin.     States history of dementia

## 2024-08-20 NOTE — CARE COORDINATION
Care Transitions Note    Follow Up Call     Patient Current Location:  Home: [address] [place] [address]    LPN Care Coordinator contacted the patient by telephone. Verified name and  as identifiers.    Additional needs identified to be addressed with provider   No needs identified                 Method of communication with provider: none.    Care Summary Note: Patient's spouse reports patient seems to be declining in that he was able to stand and pivot but now cannot.  Mrs. Owens is not able to manage patient on her own.  [facility] who is scheduled to see patient today and hopefully draw blood (hgb).  Mrs. Owens states she will speak with RN and may bring Mr. Owens back to the ED. Asked Mrs. Owens is a RN and is monitoring patients stools.    Plan of care updates since last contact:  See summary       Advance Care Planning:   Does patient have an Advance Directive: health care decision maker confirmed.    Medication Review:  No changes since last call.     Assessments:   Goals Addressed                   This Visit's Progress     Returns to baseline activity level.   On track     Patient/Family able to obtain medicine after d/c     Patient/Family able to verbalize medicine changes     Patient/Family aware and attends follow up appointments s/p d/c.     Patient/Family agrees to notify provider of any barriers to plan of care.    Patient/Family agrees to notify provider of any symptoms that indicate a worsening of condition               Follow Up Appointment:   Reviewed upcoming appointment(s).      LPN Care Coordinator provided contact information.  Plan for follow-up call in 6-10 days based on severity of symptoms and risk factors.  Plan for next call: symptom management    Leesa Srinivasan LPN

## 2024-08-20 NOTE — ED NOTES
TRANSFER - OUT REPORT:    Verbal report given to BRITTANEY Rothman on Rogerio Owens  being transferred to [facility] for routine progression of patient care       Report consisted of patient's Situation, Background, Assessment and   Recommendations(SBAR).     Information from the following report(s) Nurse Handoff Report, ED Encounter Summary, ED SBAR, Adult Overview, Intake/Output, and Recent Results was reviewed with the receiving nurse.    Henrietta Fall Assessment:    Presents to emergency department  because of falls (Syncope, seizure, or loss of consciousness): No  Age > 70: Yes  Altered Mental Status, Intoxication with alcohol or substance confusion (Disorientation, impaired judgment, poor safety awaremess, or inability to follow instructions): Yes  Impaired Mobility: Ambulates or transfers with assistive devices or assistance; Unable to ambulate or transer.: Yes  Nursing Judgement: Yes          Lines:   Peripheral IV 08/20/24 Left Wrist (Active)        Opportunity for questions and clarification was provided.      Patient transported with:  Tech

## 2024-08-21 ENCOUNTER — TRANSCRIBE ORDERS (OUTPATIENT)
Dept: INTERVENTIONAL RADIOLOGY/VASCULAR | Age: 89
End: 2024-08-21

## 2024-08-21 ENCOUNTER — HOSPITAL ENCOUNTER (OUTPATIENT)
Dept: INTERVENTIONAL RADIOLOGY/VASCULAR | Age: 89
Discharge: HOME | End: 2024-08-24
Attending: INTERNAL MEDICINE
Payer: OTHER GOVERNMENT

## 2024-08-21 ENCOUNTER — HOSPITAL ENCOUNTER (INPATIENT)
Age: 89
LOS: 11 days | Discharge: NURSING, REHAB OR LONG-TERM CARE | DRG: 056 | End: 2024-09-01
Attending: STUDENT IN AN ORGANIZED HEALTH CARE EDUCATION/TRAINING PROGRAM | Admitting: INTERNAL MEDICINE
Payer: MEDICARE

## 2024-08-21 ENCOUNTER — CARE COORDINATION (OUTPATIENT)
Dept: CARE COORDINATION | Facility: CLINIC | Age: 89
End: 2024-08-21

## 2024-08-21 VITALS
HEART RATE: 72 BPM | TEMPERATURE: 97.7 F | RESPIRATION RATE: 17 BRPM | WEIGHT: 179.9 LBS | BODY MASS INDEX: 28.91 KG/M2 | DIASTOLIC BLOOD PRESSURE: 85 MMHG | HEIGHT: 66 IN | OXYGEN SATURATION: 95 % | SYSTOLIC BLOOD PRESSURE: 126 MMHG

## 2024-08-21 VITALS
TEMPERATURE: 98 F | HEART RATE: 78 BPM | DIASTOLIC BLOOD PRESSURE: 59 MMHG | OXYGEN SATURATION: 94 % | SYSTOLIC BLOOD PRESSURE: 122 MMHG | RESPIRATION RATE: 18 BRPM

## 2024-08-21 DIAGNOSIS — M50.30 DDD (DEGENERATIVE DISC DISEASE), CERVICAL: Primary | ICD-10-CM

## 2024-08-21 DIAGNOSIS — G91.2 NPH (NORMAL PRESSURE HYDROCEPHALUS) (HCC): Primary | ICD-10-CM

## 2024-08-21 DIAGNOSIS — G91.2 NPH (NORMAL PRESSURE HYDROCEPHALUS) (HCC): ICD-10-CM

## 2024-08-21 LAB
ANION GAP SERPL CALC-SCNC: 11 MMOL/L (ref 9–18)
BASOPHILS # BLD: 0.1 K/UL (ref 0–0.2)
BASOPHILS NFR BLD: 1 % (ref 0–2)
BUN SERPL-MCNC: 15 MG/DL (ref 8–23)
C GATTII+NEOFOR DNA CSF QL NAA+NON-PROBE: NOT DETECTED
CALCIUM SERPL-MCNC: 8.9 MG/DL (ref 8.8–10.2)
CHLORIDE SERPL-SCNC: 100 MMOL/L (ref 98–107)
CMV DNA CSF QL NAA+NON-PROBE: NOT DETECTED
CO2 SERPL-SCNC: 28 MMOL/L (ref 20–28)
CREAT SERPL-MCNC: 0.84 MG/DL (ref 0.8–1.3)
DIFFERENTIAL METHOD BLD: ABNORMAL
E COLI K1 DNA CSF QL NAA+NON-PROBE: NOT DETECTED
EOSINOPHIL # BLD: 0.5 K/UL (ref 0–0.8)
EOSINOPHIL NFR BLD: 7 % (ref 0.5–7.8)
ERYTHROCYTE [DISTWIDTH] IN BLOOD BY AUTOMATED COUNT: 18.6 % (ref 11.9–14.6)
EV RNA CSF QL NAA+NON-PROBE: NOT DETECTED
GLUCOSE CSF-MCNC: 63 MG/DL (ref 50–70)
GLUCOSE SERPL-MCNC: 101 MG/DL (ref 70–99)
GP B STREP DNA CSF QL NAA+NON-PROBE: NOT DETECTED
HAEM INFLU DNA CSF QL NAA+NON-PROBE: NOT DETECTED
HCT VFR BLD AUTO: 32.1 % (ref 41.1–50.3)
HGB BLD-MCNC: 9.6 G/DL (ref 13.6–17.2)
HHV6 DNA CSF QL NAA+NON-PROBE: NOT DETECTED
HSV1 DNA CSF QL NAA+PROBE: NOT DETECTED
HSV2 DNA CSF QL NAA+NON-PROBE: NOT DETECTED
IMM GRANULOCYTES # BLD AUTO: 0 K/UL (ref 0–0.5)
IMM GRANULOCYTES NFR BLD AUTO: 0 % (ref 0–5)
L MONOCYTOG DNA CSF QL NAA+NON-PROBE: NOT DETECTED
LYMPHOCYTES # BLD: 1.1 K/UL (ref 0.5–4.6)
LYMPHOCYTES NFR BLD: 13 % (ref 13–44)
MCH RBC QN AUTO: 28.3 PG (ref 26.1–32.9)
MCHC RBC AUTO-ENTMCNC: 29.9 G/DL (ref 31.4–35)
MCV RBC AUTO: 94.7 FL (ref 82–102)
MONOCYTES # BLD: 0.7 K/UL (ref 0.1–1.3)
MONOCYTES NFR BLD: 9 % (ref 4–12)
N MEN DNA CSF QL NAA+NON-PROBE: NOT DETECTED
NEUTS SEG # BLD: 5.9 K/UL (ref 1.7–8.2)
NEUTS SEG NFR BLD: 71 % (ref 43–78)
NRBC # BLD: 0 K/UL (ref 0–0.2)
PARECHOVIRUS A RNA CSF QL NAA+NON-PROBE: NOT DETECTED
PLATELET # BLD AUTO: 276 K/UL (ref 150–450)
PMV BLD AUTO: 9.4 FL (ref 9.4–12.3)
POTASSIUM SERPL-SCNC: 3.9 MMOL/L (ref 3.5–5.1)
PROT CSF-MCNC: 58 MG/DL (ref 15–45)
RBC # BLD AUTO: 3.39 M/UL (ref 4.23–5.6)
S PNEUM DNA CSF QL NAA+NON-PROBE: NOT DETECTED
SODIUM SERPL-SCNC: 139 MMOL/L (ref 136–145)
TUBE # CSF: ABNORMAL
TUBE # CSF: NORMAL
VZV DNA CSF QL NAA+NON-PROBE: NOT DETECTED
WBC # BLD AUTO: 8.3 K/UL (ref 4.3–11.1)

## 2024-08-21 PROCEDURE — 97112 NEUROMUSCULAR REEDUCATION: CPT

## 2024-08-21 PROCEDURE — 62328 DX LMBR SPI PNXR W/FLUOR/CT: CPT | Performed by: PHYSICIAN ASSISTANT

## 2024-08-21 PROCEDURE — 80048 BASIC METABOLIC PNL TOTAL CA: CPT

## 2024-08-21 PROCEDURE — 87070 CULTURE OTHR SPECIMN AEROBIC: CPT

## 2024-08-21 PROCEDURE — 82945 GLUCOSE OTHER FLUID: CPT

## 2024-08-21 PROCEDURE — 2580000003 HC RX 258: Performed by: INTERNAL MEDICINE

## 2024-08-21 PROCEDURE — 2500000003 HC RX 250 WO HCPCS: Performed by: PHYSICIAN ASSISTANT

## 2024-08-21 PROCEDURE — 84157 ASSAY OF PROTEIN OTHER: CPT

## 2024-08-21 PROCEDURE — 85025 COMPLETE CBC W/AUTO DIFF WBC: CPT

## 2024-08-21 PROCEDURE — 62328 DX LMBR SPI PNXR W/FLUOR/CT: CPT

## 2024-08-21 PROCEDURE — 97162 PT EVAL MOD COMPLEX 30 MIN: CPT

## 2024-08-21 PROCEDURE — 88112 CYTOPATH CELL ENHANCE TECH: CPT

## 2024-08-21 PROCEDURE — 1100000000 HC RM PRIVATE

## 2024-08-21 PROCEDURE — 36415 COLL VENOUS BLD VENIPUNCTURE: CPT

## 2024-08-21 PROCEDURE — 6360000002 HC RX W HCPCS: Performed by: PHYSICIAN ASSISTANT

## 2024-08-21 PROCEDURE — 009U3ZX DRAINAGE OF SPINAL CANAL, PERCUTANEOUS APPROACH, DIAGNOSTIC: ICD-10-PCS | Performed by: RADIOLOGY

## 2024-08-21 PROCEDURE — 87205 SMEAR GRAM STAIN: CPT

## 2024-08-21 PROCEDURE — 87483 CNS DNA AMP PROBE TYPE 12-25: CPT

## 2024-08-21 PROCEDURE — 6370000000 HC RX 637 (ALT 250 FOR IP): Performed by: INTERNAL MEDICINE

## 2024-08-21 PROCEDURE — 6360000002 HC RX W HCPCS: Performed by: INTERNAL MEDICINE

## 2024-08-21 PROCEDURE — 2500000003 HC RX 250 WO HCPCS: Performed by: NURSE PRACTITIONER

## 2024-08-21 RX ORDER — PANTOPRAZOLE SODIUM 40 MG/1
40 TABLET, DELAYED RELEASE ORAL DAILY
Status: DISCONTINUED | OUTPATIENT
Start: 2024-08-22 | End: 2024-09-01 | Stop reason: HOSPADM

## 2024-08-21 RX ORDER — PANTOPRAZOLE SODIUM 40 MG/1
40 TABLET, DELAYED RELEASE ORAL DAILY
Status: CANCELLED | OUTPATIENT
Start: 2024-08-22

## 2024-08-21 RX ORDER — ESCITALOPRAM OXALATE 10 MG/1
20 TABLET ORAL DAILY
Status: CANCELLED | OUTPATIENT
Start: 2024-08-22

## 2024-08-21 RX ORDER — BUPROPION HYDROCHLORIDE 75 MG/1
75 TABLET ORAL DAILY
Status: DISCONTINUED | OUTPATIENT
Start: 2024-08-22 | End: 2024-09-01 | Stop reason: HOSPADM

## 2024-08-21 RX ORDER — POTASSIUM CHLORIDE 7.45 MG/ML
10 INJECTION INTRAVENOUS PRN
Status: DISCONTINUED | OUTPATIENT
Start: 2024-08-21 | End: 2024-09-01 | Stop reason: HOSPADM

## 2024-08-21 RX ORDER — FUROSEMIDE 20 MG/1
20 TABLET ORAL DAILY
Status: CANCELLED | OUTPATIENT
Start: 2024-08-22

## 2024-08-21 RX ORDER — LIDOCAINE HYDROCHLORIDE 10 MG/ML
INJECTION, SOLUTION INFILTRATION; PERINEURAL PRN
Status: COMPLETED | OUTPATIENT
Start: 2024-08-21 | End: 2024-08-21

## 2024-08-21 RX ORDER — ENOXAPARIN SODIUM 100 MG/ML
40 INJECTION SUBCUTANEOUS EVERY 24 HOURS
Status: CANCELLED | OUTPATIENT
Start: 2024-08-21

## 2024-08-21 RX ORDER — FUROSEMIDE 20 MG
20 TABLET ORAL DAILY
Status: DISCONTINUED | OUTPATIENT
Start: 2024-08-22 | End: 2024-09-01 | Stop reason: HOSPADM

## 2024-08-21 RX ORDER — POLYETHYLENE GLYCOL 3350 17 G/17G
17 POWDER, FOR SOLUTION ORAL DAILY PRN
Status: CANCELLED | OUTPATIENT
Start: 2024-08-21

## 2024-08-21 RX ORDER — ATORVASTATIN CALCIUM 40 MG/1
40 TABLET, FILM COATED ORAL DAILY
Status: CANCELLED | OUTPATIENT
Start: 2024-08-22

## 2024-08-21 RX ORDER — ACETAMINOPHEN 650 MG/1
650 SUPPOSITORY RECTAL EVERY 6 HOURS PRN
Status: DISCONTINUED | OUTPATIENT
Start: 2024-08-21 | End: 2024-09-01 | Stop reason: HOSPADM

## 2024-08-21 RX ORDER — BUPROPION HYDROCHLORIDE 75 MG/1
75 TABLET ORAL DAILY
Status: CANCELLED | OUTPATIENT
Start: 2024-08-22

## 2024-08-21 RX ORDER — GUAIFENESIN 200 MG/10ML
200 LIQUID ORAL EVERY 6 HOURS PRN
Status: DISCONTINUED | OUTPATIENT
Start: 2024-08-21 | End: 2024-09-01 | Stop reason: HOSPADM

## 2024-08-21 RX ORDER — ENOXAPARIN SODIUM 100 MG/ML
40 INJECTION SUBCUTANEOUS EVERY 24 HOURS
Status: DISCONTINUED | OUTPATIENT
Start: 2024-08-21 | End: 2024-09-01 | Stop reason: HOSPADM

## 2024-08-21 RX ORDER — ACETAMINOPHEN 650 MG/1
650 SUPPOSITORY RECTAL EVERY 6 HOURS PRN
Status: CANCELLED | OUTPATIENT
Start: 2024-08-21

## 2024-08-21 RX ORDER — ONDANSETRON 2 MG/ML
4 INJECTION INTRAMUSCULAR; INTRAVENOUS EVERY 6 HOURS PRN
Status: DISCONTINUED | OUTPATIENT
Start: 2024-08-21 | End: 2024-09-01 | Stop reason: HOSPADM

## 2024-08-21 RX ORDER — ONDANSETRON 4 MG/1
4 TABLET, ORALLY DISINTEGRATING ORAL EVERY 8 HOURS PRN
Status: CANCELLED | OUTPATIENT
Start: 2024-08-21

## 2024-08-21 RX ORDER — SODIUM CHLORIDE 0.9 % (FLUSH) 0.9 %
5-40 SYRINGE (ML) INJECTION EVERY 12 HOURS SCHEDULED
Status: DISCONTINUED | OUTPATIENT
Start: 2024-08-21 | End: 2024-09-01 | Stop reason: HOSPADM

## 2024-08-21 RX ORDER — LOSARTAN POTASSIUM 50 MG/1
50 TABLET ORAL DAILY
Status: DISCONTINUED | OUTPATIENT
Start: 2024-08-22 | End: 2024-08-22

## 2024-08-21 RX ORDER — ONDANSETRON 2 MG/ML
4 INJECTION INTRAMUSCULAR; INTRAVENOUS EVERY 6 HOURS PRN
Status: CANCELLED | OUTPATIENT
Start: 2024-08-21

## 2024-08-21 RX ORDER — SODIUM CHLORIDE 0.9 % (FLUSH) 0.9 %
5-40 SYRINGE (ML) INJECTION EVERY 12 HOURS SCHEDULED
Status: CANCELLED | OUTPATIENT
Start: 2024-08-21

## 2024-08-21 RX ORDER — ESCITALOPRAM OXALATE 10 MG/1
20 TABLET ORAL DAILY
Status: DISCONTINUED | OUTPATIENT
Start: 2024-08-22 | End: 2024-09-01 | Stop reason: HOSPADM

## 2024-08-21 RX ORDER — ONDANSETRON 2 MG/ML
INJECTION INTRAMUSCULAR; INTRAVENOUS PRN
Status: COMPLETED | OUTPATIENT
Start: 2024-08-21 | End: 2024-08-21

## 2024-08-21 RX ORDER — LOSARTAN POTASSIUM 50 MG/1
50 TABLET ORAL DAILY
Status: CANCELLED | OUTPATIENT
Start: 2024-08-22

## 2024-08-21 RX ORDER — POLYETHYLENE GLYCOL 3350 17 G/17G
17 POWDER, FOR SOLUTION ORAL DAILY PRN
Status: DISCONTINUED | OUTPATIENT
Start: 2024-08-21 | End: 2024-09-01 | Stop reason: HOSPADM

## 2024-08-21 RX ORDER — ACETAMINOPHEN 325 MG/1
650 TABLET ORAL EVERY 6 HOURS PRN
Status: CANCELLED | OUTPATIENT
Start: 2024-08-21

## 2024-08-21 RX ORDER — ACETAMINOPHEN 325 MG/1
650 TABLET ORAL EVERY 6 HOURS PRN
Status: DISCONTINUED | OUTPATIENT
Start: 2024-08-21 | End: 2024-09-01 | Stop reason: HOSPADM

## 2024-08-21 RX ORDER — POTASSIUM CHLORIDE 7.45 MG/ML
10 INJECTION INTRAVENOUS PRN
Status: CANCELLED | OUTPATIENT
Start: 2024-08-21

## 2024-08-21 RX ORDER — OXYCODONE AND ACETAMINOPHEN 5; 325 MG/1; MG/1
1 TABLET ORAL EVERY 12 HOURS PRN
Status: DISCONTINUED | OUTPATIENT
Start: 2024-08-21 | End: 2024-08-25

## 2024-08-21 RX ORDER — POTASSIUM CHLORIDE 1500 MG/1
40 TABLET, EXTENDED RELEASE ORAL PRN
Status: DISCONTINUED | OUTPATIENT
Start: 2024-08-21 | End: 2024-09-01 | Stop reason: HOSPADM

## 2024-08-21 RX ORDER — ONDANSETRON 4 MG/1
4 TABLET, ORALLY DISINTEGRATING ORAL EVERY 8 HOURS PRN
Status: DISCONTINUED | OUTPATIENT
Start: 2024-08-21 | End: 2024-09-01 | Stop reason: HOSPADM

## 2024-08-21 RX ORDER — POTASSIUM CHLORIDE 20 MEQ/1
40 TABLET, EXTENDED RELEASE ORAL PRN
Status: CANCELLED | OUTPATIENT
Start: 2024-08-21

## 2024-08-21 RX ORDER — OXYCODONE HYDROCHLORIDE AND ACETAMINOPHEN 5; 325 MG/1; MG/1
1 TABLET ORAL EVERY 12 HOURS PRN
Status: CANCELLED | OUTPATIENT
Start: 2024-08-21

## 2024-08-21 RX ADMIN — SODIUM CHLORIDE, PRESERVATIVE FREE 5 ML: 5 INJECTION INTRAVENOUS at 20:29

## 2024-08-21 RX ADMIN — ONDANSETRON 4 MG: 2 INJECTION INTRAMUSCULAR; INTRAVENOUS at 11:15

## 2024-08-21 RX ADMIN — LIDOCAINE HYDROCHLORIDE 5 ML: 10 INJECTION, SOLUTION INFILTRATION; PERINEURAL at 11:17

## 2024-08-21 RX ADMIN — OXYCODONE HYDROCHLORIDE AND ACETAMINOPHEN 1 TABLET: 5; 325 TABLET ORAL at 20:29

## 2024-08-21 RX ADMIN — ENOXAPARIN SODIUM 40 MG: 100 INJECTION SUBCUTANEOUS at 20:29

## 2024-08-21 RX ADMIN — GUAIFENESIN 200 MG: 200 SOLUTION ORAL at 20:32

## 2024-08-21 ASSESSMENT — PAIN DESCRIPTION - ORIENTATION: ORIENTATION: RIGHT

## 2024-08-21 ASSESSMENT — PAIN DESCRIPTION - DESCRIPTORS: DESCRIPTORS: ACHING;DISCOMFORT

## 2024-08-21 ASSESSMENT — PAIN SCALES - GENERAL
PAINLEVEL_OUTOF10: 0
PAINLEVEL_OUTOF10: 5

## 2024-08-21 ASSESSMENT — PAIN DESCRIPTION - LOCATION: LOCATION: SHOULDER

## 2024-08-21 ASSESSMENT — PAIN - FUNCTIONAL ASSESSMENT: PAIN_FUNCTIONAL_ASSESSMENT: PREVENTS OR INTERFERES SOME ACTIVE ACTIVITIES AND ADLS

## 2024-08-21 NOTE — PLAN OF CARE
Problem: Discharge Planning  Goal: Discharge to home or other facility with appropriate resources  Outcome: Progressing     Problem: Skin/Tissue Integrity  Goal: Absence of new skin breakdown  Description: 1.  Monitor for areas of redness and/or skin breakdown  2.  Assess vascular access sites hourly  3.  Every 4-6 hours minimum:  Change oxygen saturation probe site  4.  Every 4-6 hours:  If on nasal continuous positive airway pressure, respiratory therapy assess nares and determine need for appliance change or resting period.  Outcome: Progressing     Problem: Pain  Goal: Verbalizes/displays adequate comfort level or baseline comfort level  Outcome: Adequate for Discharge     Problem: Safety - Adult  Goal: Free from fall injury  Outcome: Adequate for Discharge     Problem: ABCDS Injury Assessment  Goal: Absence of physical injury  Outcome: Adequate for Discharge

## 2024-08-21 NOTE — PROGRESS NOTES
Hospitalist Progress Note   Admit Date:  2024 12:24 PM   Name:  Rogerio Owens   Age:  88 y.o.  Sex:  male  :  1935   MRN:  [id number]   Room:  [facility]/    Presenting/Chief Complaint: Fatigue     Reason(s) for Admission: Transient alteration of awareness [R40.4]  NPH (normal pressure hydrocephalus) (HCC) [G91.2]  Generalized weakness [R53.1]  Urinary tract infection without hematuria, site unspecified [N39.0]     Hospital Course:   Rogerio Owens is a 88 y.o. 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 He was recently hospitalized more than one week ago for suspected GI bleed and with GI consult recommending conservative care. He had been discharged on oral iron therapy. He has been off Pradaxa since hospital discharge.      Since that time, his spouse says he has been having worsening of his symptoms as detailed above. He had an MRI of his brain that showed dilated ventricles. He has followed neurology at [facility], and at that time, benefits of LP seemed like they did not outweigh the risks sine he was still on anticoagulation.      ED course: Hgb of 9 (similar to previous), CXR shows possible right-sided lung nodule. Given Rocephin for concerns for possible UTI. Hospitalist consulted for admission.     Subjective & 24hr Events:     Patient examined at bedside. No acute overnight events. Still with same symptoms as yesterday but unchanged. Spouse at bedside. No new particular complaints when directly asked.     Assessment & Plan:     Principal Problem:    NPH (normal pressure hydrocephalus) (HCC)  - significant clinical improvement post-lumbar puncture  - will get neurosurgery consult for shunt consideration  - PT/OT consults  - will also need outpatient follow-up    Right-sided lung nodule  - per CXR prior to admission  - once 
  Medical Student Progress Note   Admit Date:  2024 12:24 PM   Name:  Rogerio Owens   Age:  88 y.o.  Sex:  male  :  1935   MRN:  [id number]   Room:  [facility]    Presenting Complaint: Fatigue    Reason(s) for Admission: Transient alteration of awareness [R40.4]  NPH (normal pressure hydrocephalus) (HCC) [G91.2]  Generalized weakness [R53.1]  Urinary tract infection without hematuria, site unspecified [N39.0]     Hospital Course:   Rogerio Owens is an 89 yo M with PMH including left lung cancer s/p resection complicated by PE, HTN, HLD, Dementia, and Vertigo who presented to the hospital for increased falls and confusion. In May 2024, an MRI showed dilated ventricles. He was unable to get a LP at this time due to being on direct-thrombin inhibitor therapy. He has been off this therapy now since  and he and his wife came to the hospital to evaluate the symptoms. CT Head shows dilated ventricles that are stable in comparison to the last scan. He will be transported to  to receive an LP from IR and to be seen seen bedside by the neurologists before returning back to [facility].     Subjective & 24-hour events:  Mr. Owens seen this morning. He and his wife were wanting to know more about the procedure and plans for the day. He will be transported to  for the procedure and likely brought back to  unless they are able to secure him a hospital bed.    Assessment & Plan:   Rogerio Owens is an 89 yo M with PMH including left lung cancer s/p resection complicated by PE, HTN, HLD, Dementia, and Vertigo who presented to the hospital for evaluation of NPH.    Normal Pressure Hydrocephalus  - Patient reports recent increased confusion, gait instability, and urinary incontinence. His wife corroborates this story. He has received CT head which shows ventriculomegaly stable in size since his last scan in May 2024. He is off the thrombin inhibitor and will receive a Lumbar Puncture from IR at [facility] 
PT/OT note:  Patient downtown for LP.  Will re-attempt therapy evaluations at another time.    Moraima Valadez, PT  
Please place the video camera in the room and let me know when ready.     Jayden Santos, DO  Neurology    
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     
[x] Functional impairment in trunk and B LE   Tone [] [] Trunk tone in sitting with strong posterior push   Edema [] [] NT   Activity Tolerance [] [x] Impaired from baseline secondary to c/o dizziness    [] []      COGNITION/  PERCEPTION: Intact Impaired  Comments   Orientation [] [x] Oriented to person, birthday and situation, disoriented to date   Vision [] []    Hearing [x] [] WFL   Cognition  [] [x] Baseline diagnosis of dementia; patient's spouse reports no decline in last 4-5 months     BALANCE: Good Fair+ Fair Fair- Poor NT Comments   Sitting Static [] [x] [] [] [x] []    Sitting Dynamic [] [x] [] [] [x] []              Standing Static [] [] [x] [] [] [x]    Standing Dynamic [] [] [] [x] [] [x]      MOBILITY: I Mod I S SBA CGA Min Mod Max Total  NT x2 Comments:   Bed Mobility    Rolling [] [] [] [] [] [x] [] [x] [] [] []    Supine to Sit [] [] [] [] [] [x] [] [x] [] [] [] With facilitation and multimodal cues   Scooting [] [] [] [] [] [x] [] [x] [] [] []    Sit to Supine [] [] [] [] [] [x] [] [x] [] [] []    Transfers    Sit to Stand [] [] [] [] [] [x] [] [] [] [x] [] Patient required total assistance to maintain his sitting balance; unable to initiate transfer with 1 person assistance pre LP, min A x2 post   Bed to Chair [] [] [] [] [] [x] [] [] [] [x] []    Stand to Sit [] [] [] [] [] [x] [] [] [] [x] []    I=Independent, Mod I=Modified Independent, S=Supervision, SBA=Standby Assistance, CGA=Contact Guard Assistance,   Min=Minimal Assistance, Mod=Moderate Assistance, Max=Maximal Assistance, Total=Total Assistance, NT=Not Tested    PLAN:   THERAPY PROGNOSIS: Good    FREQUENCY AND DURATION: Daily plan of care.     ACUTE PHYSICAL THERAPY GOALS:   ST. Patient will perform bed mobility with MODERATE ASSISTANCE within 3 days.  2. Patient will transfer sit to stand with MODERATE ASSISTANCE within 3 days.   3. Patient will transfer from bed to chair with MODERATE ASSISTANCE within 3 days.  4. Patient will

## 2024-08-21 NOTE — DISCHARGE SUMMARY
Patient being transferred to DT for further care. Please see progress notes for further info. No additional bill for this encounter.

## 2024-08-21 NOTE — PROGRESS NOTES
Pt with emesis upon positioning prone.  Approx 50 ml yellow fluid with solids. Pt without S\s of aspiration during procedure

## 2024-08-21 NOTE — BRIEF OP NOTE
[facility]  Department of Interventional Radiology  [phone]        Interventional Radiology Brief Procedure Note    Patient: Rogerio Owens MRN: [id number]  SSN: [id number]    YOB: 1935  Age: 88 y.o.  Sex: male      Date of Procedure: 8/21/2024    Pre-Procedure Diagnosis: NPH, ventriculomegaly    Post-Procedure Diagnosis: SAME    Procedure(s): Lumbar Puncture    Brief Description of Procedure: as above.  26 ml CSF removed    Performed By: Susie Dickson PA-C     Assistants: None    Anesthesia:Lidocaine    Estimated Blood Loss: None    Specimens:   csf    Implants:  None    Findings: opening pressure 10 cm H20, closing pressure <10.    Complications: None    Recommendations: bedrest     Follow Up: prn    Signed By: Susie Dickson PA-C     August 21, 2024

## 2024-08-21 NOTE — ACP (ADVANCE CARE PLANNING)
Advance Care Planning   General Advance Care Planning (ACP) Conversation    Date of Conversation: 8/20/2024  Conducted with: Legal next of kin  Other persons present: None    Healthcare Decision Maker:    Primary Decision Maker: Yuni Owens - Spouse - [phone]    Secondary Decision Maker: Charlotte Crespo - Child - [phone]    Secondary Decision Maker: Fidencio Owens - Child - [phone]    Today we documented Decision Maker(s) consistent with Legal Next of Kin hierarchy.  Content/Action Overview:  Has ACP document(s) NOT on file - requested patient to provide    Length of Voluntary ACP Conversation in minutes:  <16 minutes (Non-Billable)    Be Patino RN

## 2024-08-21 NOTE — OP NOTE
Title: Lumbar puncture.    Supervising Physician: Luis Sainz MD  :  Susie Dickson PA-C    History: 88-year-old male with normal pressure hydrocephalus who requires a  lumbar puncture.      Consent: Informed written and oral consent was obtained from the patient and his  wife after explanation of benefits and risks (including, but not limited to:  infection, nerve injury, hemorrhage). Questions were answered to their  satisfaction and they requested that we proceed.    Procedure:  Sterile barrier technique (including:  cap, mask, sterile gloves,  sterile sheet, hand hygiene, and betadine for cutaneous antisepsis) were used.  With the patient prone, the skin of the back was prepped and draped in the  standard sterile fashion. 1% lidocaine was used for local field block. Using  fluoroscopy, a 22 gauge spinal needle was advanced into the intrathecal space at  the L3-4 level. Appropriate position was confirmed with clear spinal fluid  return.    Opening pressure was 10 cm H20. Closing pressure was less than 10 cm H20.    A total of 26 cc was removed for evaluation.      The needle was removed and a dressing was applied.    Complications: None.    Radiation Exposure Indices:  Fluoroscopy Exposure Time = 6 seconds  Reference Air Kerma (Ka,r) = 1 mGy  Dose Area Product/Kerma Area Product (DAP/LORENE/PKA) = 12.98 cGy-cm2  A permanent fluoroscopic image was stored in PACS.    Contrast: 0 milliliters.   Impression:     Uncomplicated lumbar puncture.      Plan: The patient will recover at bedrest.    The physician attests to supervising this procedure and agrees to the report as  written    Attestation:  Susie ABDUL PA-C, attest to performing this procedure  under the supervision of the MD and agree with the report as written.

## 2024-08-21 NOTE — CARE COORDINATION
CASE MANAGEMENT ASSESSMENT NOTE    Patient is a 88 year old male with Transient Alteration of Awareness.      Patient assessment completed at bedside.  Patient is asleep in room, respirations even and unlabored.  RNCM contacted patient's spouse on file, Yuni Owens to conduct case management assessment.  She states she is POA and will get RNCM a copy of advance directives.  He lives at home with spouse.  At baseline, he transfers with a wheelchair and needs assist with most ADLs.  Lives in a single story home with ramped entrance.  Spouse states that he was receiving interim home health and they referred him to [facility] to rehab.  Case management was notified by [facility] that they will conduct an assessment on patient.  Patient has Medicare and VA insurance.  PCP was Dr. Singh, but they have retired.  He was supposed to see a new PCP, Dr. Payam Gonzalez today but was hospitalized.  RNCM offered referral to [facility] but caregiver declined, stating they wished to keep Dr. Gonzalez.    At this time, anticipate patient have SNF vs Inpatient rehab needs.  PT/OT evals have been ordered and awaiting recommendations.  Case management will continue to follow.  Please notify if there are any changes.       Attending Physician: Victorino Polo DO  Admit Problem: Transient alteration of awareness [R40.4]  NPH (normal pressure hydrocephalus) (HCC) [G91.2]  Generalized weakness [R53.1]  Urinary tract infection without hematuria, site unspecified [N39.0]  Date/Time of Admission: 8/20/2024 12:24 PM  Problem List:  Patient Active Problem List   Diagnosis    DDD (degenerative disc disease), cervical    Neck pain    History of pulmonary embolus (PE)    Essential hypertension    GERD (gastroesophageal reflux disease)    History of malignant 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 (HCC)    Nontraumatic tear of left rotator cuff    Acute hypoxemic respiratory failure (HCC)    HLD (hyperlipidemia)    PNA (pneumonia)    Acute pulmonary

## 2024-08-21 NOTE — PROGRESS NOTES
TRANSFER - OUT REPORT:    Verbal report given to Michelle QUISPE on Rogerio Owens  being transferred to [facility] for routine progression of patient care       Report consisted of patient's Situation, Background, Assessment and   Recommendations(SBAR).     Information from the following report(s) Nurse Handoff Report, MAR, and Event Log was reviewed with the receiving nurse.           Lines:   Peripheral IV 08/20/24 Left Wrist (Active)   Site Assessment Clean, dry & intact 08/21/24 0757   Line Status Normal saline locked 08/20/24 2030   Phlebitis Assessment No symptoms 08/20/24 2030   Infiltration Assessment 0 08/20/24 2030   Dressing Status Clean, dry & intact 08/20/24 2030   Dressing Type Transparent 08/20/24 2030        Opportunity for questions and clarification was provided.      Patient transported with:  [facility]

## 2024-08-22 LAB
BACTERIA SPEC CULT: ABNORMAL
BACTERIA SPEC CULT: ABNORMAL
CYTOLOGY-NON GYN: NORMAL
SERVICE CMNT-IMP: ABNORMAL
SPECIMEN SOURCE: NORMAL

## 2024-08-22 PROCEDURE — 97166 OT EVAL MOD COMPLEX 45 MIN: CPT

## 2024-08-22 PROCEDURE — 2580000003 HC RX 258: Performed by: INTERNAL MEDICINE

## 2024-08-22 PROCEDURE — 97530 THERAPEUTIC ACTIVITIES: CPT

## 2024-08-22 PROCEDURE — 6360000002 HC RX W HCPCS: Performed by: INTERNAL MEDICINE

## 2024-08-22 PROCEDURE — 97112 NEUROMUSCULAR REEDUCATION: CPT

## 2024-08-22 PROCEDURE — 6370000000 HC RX 637 (ALT 250 FOR IP): Performed by: INTERNAL MEDICINE

## 2024-08-22 PROCEDURE — 1100000000 HC RM PRIVATE

## 2024-08-22 PROCEDURE — 97535 SELF CARE MNGMENT TRAINING: CPT

## 2024-08-22 PROCEDURE — 99223 1ST HOSP IP/OBS HIGH 75: CPT | Performed by: STUDENT IN AN ORGANIZED HEALTH CARE EDUCATION/TRAINING PROGRAM

## 2024-08-22 RX ADMIN — SODIUM CHLORIDE, PRESERVATIVE FREE 10 ML: 5 INJECTION INTRAVENOUS at 20:31

## 2024-08-22 RX ADMIN — ESCITALOPRAM OXALATE 20 MG: 10 TABLET ORAL at 08:45

## 2024-08-22 RX ADMIN — OXYCODONE HYDROCHLORIDE AND ACETAMINOPHEN 1 TABLET: 5; 325 TABLET ORAL at 18:07

## 2024-08-22 RX ADMIN — FUROSEMIDE 20 MG: 20 TABLET ORAL at 08:45

## 2024-08-22 RX ADMIN — BUPROPION HYDROCHLORIDE 75 MG: 75 TABLET, FILM COATED ORAL at 08:45

## 2024-08-22 RX ADMIN — POLYETHYLENE GLYCOL 3350 17 G: 17 POWDER, FOR SOLUTION ORAL at 16:23

## 2024-08-22 RX ADMIN — PANTOPRAZOLE SODIUM 40 MG: 40 TABLET, DELAYED RELEASE ORAL at 08:46

## 2024-08-22 RX ADMIN — ENOXAPARIN SODIUM 40 MG: 100 INJECTION SUBCUTANEOUS at 20:31

## 2024-08-22 RX ADMIN — SODIUM CHLORIDE, PRESERVATIVE FREE 10 ML: 5 INJECTION INTRAVENOUS at 08:47

## 2024-08-22 RX ADMIN — WATER 1000 MG: 1 INJECTION INTRAMUSCULAR; INTRAVENOUS; SUBCUTANEOUS at 16:24

## 2024-08-22 ASSESSMENT — PAIN SCALES - GENERAL
PAINLEVEL_OUTOF10: 0
PAINLEVEL_OUTOF10: 0
PAINLEVEL_OUTOF10: 7
PAINLEVEL_OUTOF10: 6

## 2024-08-22 ASSESSMENT — PAIN DESCRIPTION - ORIENTATION: ORIENTATION: MID

## 2024-08-22 ASSESSMENT — PAIN DESCRIPTION - ONSET: ONSET: ON-GOING

## 2024-08-22 ASSESSMENT — PAIN - FUNCTIONAL ASSESSMENT: PAIN_FUNCTIONAL_ASSESSMENT: ACTIVITIES ARE NOT PREVENTED

## 2024-08-22 ASSESSMENT — PAIN DESCRIPTION - DESCRIPTORS: DESCRIPTORS: ACHING;BURNING

## 2024-08-22 ASSESSMENT — PAIN DESCRIPTION - FREQUENCY: FREQUENCY: CONTINUOUS

## 2024-08-22 ASSESSMENT — PAIN DESCRIPTION - PAIN TYPE: TYPE: ACUTE PAIN

## 2024-08-22 ASSESSMENT — PAIN DESCRIPTION - LOCATION: LOCATION: HEAD

## 2024-08-22 NOTE — CONSULTS
Neurology Consult Note       History:   88-year-old male with cervical stenosis, LULU admitted for UTI in the setting of chronic neurologic decline.  History from the spouse.  He was walking independently around 6 months ago when he started to need more and his wife reports his gait was shuffling support, she was concerned that he had Parkinson's. Since the past 4 months has been dependent on a wheelchair.  Additionally he has \"no control of his bladder.\"  He gets confused at night at times but otherwise no major cognitive issues.  Previously was on Pradaxa for PE but taken off recently after a bleeding event.       Exam: Pertinent positives and negatives include:  Fully awake alert and oriented. No language deficits or dysarthria.  No involuntary abnormal saccades or nystagmus.  No facial asymmetry.  He moves extremities spontaneously and with purpose; he is diffusely rigid throughout.  No abnormal involuntary movements and muscle tone is normal throughout. Sensation is intact to light touch.     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  He received spinal tap with pre-LP  and post-LP PT assessments, who noted improvement mostly with transferring positions but still with some apraxic gait post-LP.    Patient and spouse are receptive to continuing conversations for possible future  shunt.  Prior to this, patient should have a lumbar drain placed, with daily PT evaluations for 2 or 3 days for a more definitive pre-surgical assessment.      This plan was discussed with NSGY Dr. Muñiz who would place the drain.  It will be best for it to be placed on a Monday, so that the patient can reliably receive PT evaluations throughout the week.       Jayden Santos,   Neurology      Cumulative time spent today was 45 minutes which included chart

## 2024-08-23 PROBLEM — G93.41 ACUTE METABOLIC ENCEPHALOPATHY: Status: ACTIVE | Noted: 2024-08-23

## 2024-08-23 LAB
ANION GAP SERPL CALC-SCNC: 9 MMOL/L (ref 9–18)
BASOPHILS # BLD: 0.1 K/UL (ref 0–0.2)
BASOPHILS NFR BLD: 1 % (ref 0–2)
BUN SERPL-MCNC: 20 MG/DL (ref 8–23)
CALCIUM SERPL-MCNC: 8.9 MG/DL (ref 8.8–10.2)
CHLORIDE SERPL-SCNC: 102 MMOL/L (ref 98–107)
CO2 SERPL-SCNC: 30 MMOL/L (ref 20–28)
CREAT SERPL-MCNC: 0.88 MG/DL (ref 0.8–1.3)
DIFFERENTIAL METHOD BLD: ABNORMAL
EOSINOPHIL # BLD: 0.6 K/UL (ref 0–0.8)
EOSINOPHIL NFR BLD: 7 % (ref 0.5–7.8)
ERYTHROCYTE [DISTWIDTH] IN BLOOD BY AUTOMATED COUNT: 18.4 % (ref 11.9–14.6)
GLUCOSE SERPL-MCNC: 103 MG/DL (ref 70–99)
HCT VFR BLD AUTO: 35.2 % (ref 41.1–50.3)
HGB BLD-MCNC: 10.6 G/DL (ref 13.6–17.2)
IMM GRANULOCYTES # BLD AUTO: 0 K/UL (ref 0–0.5)
IMM GRANULOCYTES NFR BLD AUTO: 1 % (ref 0–5)
LYMPHOCYTES # BLD: 1.4 K/UL (ref 0.5–4.6)
LYMPHOCYTES NFR BLD: 18 % (ref 13–44)
MCH RBC QN AUTO: 28.6 PG (ref 26.1–32.9)
MCHC RBC AUTO-ENTMCNC: 30.1 G/DL (ref 31.4–35)
MCV RBC AUTO: 94.9 FL (ref 82–102)
MONOCYTES # BLD: 0.7 K/UL (ref 0.1–1.3)
MONOCYTES NFR BLD: 9 % (ref 4–12)
NEUTS SEG # BLD: 5.1 K/UL (ref 1.7–8.2)
NEUTS SEG NFR BLD: 65 % (ref 43–78)
NRBC # BLD: 0 K/UL (ref 0–0.2)
PLATELET # BLD AUTO: 291 K/UL (ref 150–450)
PMV BLD AUTO: 9.5 FL (ref 9.4–12.3)
POTASSIUM SERPL-SCNC: 3.9 MMOL/L (ref 3.5–5.1)
RBC # BLD AUTO: 3.71 M/UL (ref 4.23–5.6)
SODIUM SERPL-SCNC: 141 MMOL/L (ref 136–145)
WBC # BLD AUTO: 7.8 K/UL (ref 4.3–11.1)

## 2024-08-23 PROCEDURE — 1100000000 HC RM PRIVATE

## 2024-08-23 PROCEDURE — 36415 COLL VENOUS BLD VENIPUNCTURE: CPT

## 2024-08-23 PROCEDURE — 6370000000 HC RX 637 (ALT 250 FOR IP): Performed by: INTERNAL MEDICINE

## 2024-08-23 PROCEDURE — 80048 BASIC METABOLIC PNL TOTAL CA: CPT

## 2024-08-23 PROCEDURE — 85025 COMPLETE CBC W/AUTO DIFF WBC: CPT

## 2024-08-23 PROCEDURE — 6360000002 HC RX W HCPCS: Performed by: INTERNAL MEDICINE

## 2024-08-23 PROCEDURE — 2500000003 HC RX 250 WO HCPCS: Performed by: NURSE PRACTITIONER

## 2024-08-23 PROCEDURE — 2580000003 HC RX 258: Performed by: INTERNAL MEDICINE

## 2024-08-23 RX ADMIN — WATER 1000 MG: 1 INJECTION INTRAMUSCULAR; INTRAVENOUS; SUBCUTANEOUS at 15:50

## 2024-08-23 RX ADMIN — SODIUM CHLORIDE, PRESERVATIVE FREE 10 ML: 5 INJECTION INTRAVENOUS at 22:31

## 2024-08-23 RX ADMIN — ENOXAPARIN SODIUM 40 MG: 100 INJECTION SUBCUTANEOUS at 22:30

## 2024-08-23 RX ADMIN — OXYCODONE HYDROCHLORIDE AND ACETAMINOPHEN 1 TABLET: 5; 325 TABLET ORAL at 15:49

## 2024-08-23 RX ADMIN — GUAIFENESIN 200 MG: 200 SOLUTION ORAL at 22:30

## 2024-08-23 ASSESSMENT — PAIN DESCRIPTION - LOCATION: LOCATION: HEAD

## 2024-08-23 ASSESSMENT — PAIN SCALES - GENERAL
PAINLEVEL_OUTOF10: 0
PAINLEVEL_OUTOF10: 8

## 2024-08-24 LAB
ANION GAP SERPL CALC-SCNC: 9 MMOL/L (ref 9–18)
BASOPHILS # BLD: 0.1 K/UL (ref 0–0.2)
BASOPHILS NFR BLD: 1 % (ref 0–2)
BUN SERPL-MCNC: 23 MG/DL (ref 8–23)
CALCIUM SERPL-MCNC: 9.1 MG/DL (ref 8.8–10.2)
CHLORIDE SERPL-SCNC: 102 MMOL/L (ref 98–107)
CO2 SERPL-SCNC: 29 MMOL/L (ref 20–28)
CREAT SERPL-MCNC: 0.77 MG/DL (ref 0.8–1.3)
DIFFERENTIAL METHOD BLD: ABNORMAL
EOSINOPHIL # BLD: 0.5 K/UL (ref 0–0.8)
EOSINOPHIL NFR BLD: 7 % (ref 0.5–7.8)
ERYTHROCYTE [DISTWIDTH] IN BLOOD BY AUTOMATED COUNT: 18.6 % (ref 11.9–14.6)
GLUCOSE SERPL-MCNC: 109 MG/DL (ref 70–99)
HCT VFR BLD AUTO: 35.1 % (ref 41.1–50.3)
HGB BLD-MCNC: 10.6 G/DL (ref 13.6–17.2)
IMM GRANULOCYTES # BLD AUTO: 0 K/UL (ref 0–0.5)
IMM GRANULOCYTES NFR BLD AUTO: 0 % (ref 0–5)
LYMPHOCYTES # BLD: 1.5 K/UL (ref 0.5–4.6)
LYMPHOCYTES NFR BLD: 20 % (ref 13–44)
MCH RBC QN AUTO: 28.6 PG (ref 26.1–32.9)
MCHC RBC AUTO-ENTMCNC: 30.2 G/DL (ref 31.4–35)
MCV RBC AUTO: 94.9 FL (ref 82–102)
MONOCYTES # BLD: 0.7 K/UL (ref 0.1–1.3)
MONOCYTES NFR BLD: 10 % (ref 4–12)
NEUTS SEG # BLD: 4.7 K/UL (ref 1.7–8.2)
NEUTS SEG NFR BLD: 62 % (ref 43–78)
NRBC # BLD: 0 K/UL (ref 0–0.2)
PLATELET # BLD AUTO: 295 K/UL (ref 150–450)
PMV BLD AUTO: 9.5 FL (ref 9.4–12.3)
POTASSIUM SERPL-SCNC: 3.9 MMOL/L (ref 3.5–5.1)
RBC # BLD AUTO: 3.7 M/UL (ref 4.23–5.6)
SODIUM SERPL-SCNC: 140 MMOL/L (ref 136–145)
WBC # BLD AUTO: 7.6 K/UL (ref 4.3–11.1)

## 2024-08-24 PROCEDURE — 6370000000 HC RX 637 (ALT 250 FOR IP): Performed by: PHYSICIAN ASSISTANT

## 2024-08-24 PROCEDURE — 85025 COMPLETE CBC W/AUTO DIFF WBC: CPT

## 2024-08-24 PROCEDURE — 80048 BASIC METABOLIC PNL TOTAL CA: CPT

## 2024-08-24 PROCEDURE — 2500000003 HC RX 250 WO HCPCS: Performed by: NURSE PRACTITIONER

## 2024-08-24 PROCEDURE — 2580000003 HC RX 258: Performed by: INTERNAL MEDICINE

## 2024-08-24 PROCEDURE — 6360000002 HC RX W HCPCS: Performed by: FAMILY MEDICINE

## 2024-08-24 PROCEDURE — 6370000000 HC RX 637 (ALT 250 FOR IP): Performed by: INTERNAL MEDICINE

## 2024-08-24 PROCEDURE — 2580000003 HC RX 258: Performed by: PHYSICIAN ASSISTANT

## 2024-08-24 PROCEDURE — 2580000003 HC RX 258: Performed by: FAMILY MEDICINE

## 2024-08-24 PROCEDURE — 1100000000 HC RM PRIVATE

## 2024-08-24 PROCEDURE — 6360000002 HC RX W HCPCS: Performed by: INTERNAL MEDICINE

## 2024-08-24 PROCEDURE — 36415 COLL VENOUS BLD VENIPUNCTURE: CPT

## 2024-08-24 PROCEDURE — A4217 STERILE WATER/SALINE, 500 ML: HCPCS | Performed by: PHYSICIAN ASSISTANT

## 2024-08-24 RX ADMIN — BUPROPION HYDROCHLORIDE 75 MG: 75 TABLET, FILM COATED ORAL at 08:24

## 2024-08-24 RX ADMIN — SODIUM CHLORIDE, PRESERVATIVE FREE 10 ML: 5 INJECTION INTRAVENOUS at 21:15

## 2024-08-24 RX ADMIN — POLYETHYLENE GLYCOL 3350 17 G: 17 POWDER, FOR SOLUTION ORAL at 08:36

## 2024-08-24 RX ADMIN — GUAIFENESIN 200 MG: 200 SOLUTION ORAL at 21:14

## 2024-08-24 RX ADMIN — ENOXAPARIN SODIUM 40 MG: 100 INJECTION SUBCUTANEOUS at 21:14

## 2024-08-24 RX ADMIN — SODIUM CHLORIDE, PRESERVATIVE FREE 10 ML: 5 INJECTION INTRAVENOUS at 08:25

## 2024-08-24 RX ADMIN — LACTULOSE: 10 SOLUTION ORAL; RECTAL at 12:25

## 2024-08-24 RX ADMIN — OXYCODONE HYDROCHLORIDE AND ACETAMINOPHEN 1 TABLET: 5; 325 TABLET ORAL at 22:01

## 2024-08-24 RX ADMIN — OXYCODONE HYDROCHLORIDE AND ACETAMINOPHEN 1 TABLET: 5; 325 TABLET ORAL at 08:35

## 2024-08-24 RX ADMIN — FUROSEMIDE 20 MG: 20 TABLET ORAL at 08:24

## 2024-08-24 RX ADMIN — WATER 1000 MG: 1 INJECTION INTRAMUSCULAR; INTRAVENOUS; SUBCUTANEOUS at 15:32

## 2024-08-24 RX ADMIN — ESCITALOPRAM OXALATE 20 MG: 10 TABLET ORAL at 08:25

## 2024-08-24 RX ADMIN — PANTOPRAZOLE SODIUM 40 MG: 40 TABLET, DELAYED RELEASE ORAL at 08:25

## 2024-08-24 ASSESSMENT — PAIN DESCRIPTION - ONSET
ONSET: GRADUAL
ONSET: GRADUAL

## 2024-08-24 ASSESSMENT — PAIN DESCRIPTION - PAIN TYPE
TYPE: CHRONIC PAIN
TYPE: CHRONIC PAIN

## 2024-08-24 ASSESSMENT — PAIN DESCRIPTION - LOCATION
LOCATION: HEAD;SHOULDER
LOCATION: SHOULDER

## 2024-08-24 ASSESSMENT — PAIN SCALES - GENERAL
PAINLEVEL_OUTOF10: 5
PAINLEVEL_OUTOF10: 0
PAINLEVEL_OUTOF10: 6
PAINLEVEL_OUTOF10: 0

## 2024-08-24 ASSESSMENT — PAIN DESCRIPTION - ORIENTATION
ORIENTATION: RIGHT
ORIENTATION: RIGHT

## 2024-08-24 ASSESSMENT — PAIN DESCRIPTION - DESCRIPTORS
DESCRIPTORS: DULL;ACHING;DISCOMFORT;SORE
DESCRIPTORS: ACHING

## 2024-08-24 ASSESSMENT — PAIN DESCRIPTION - FREQUENCY
FREQUENCY: CONTINUOUS
FREQUENCY: INTERMITTENT

## 2024-08-24 ASSESSMENT — PAIN - FUNCTIONAL ASSESSMENT
PAIN_FUNCTIONAL_ASSESSMENT: PREVENTS OR INTERFERES SOME ACTIVE ACTIVITIES AND ADLS
PAIN_FUNCTIONAL_ASSESSMENT: ACTIVITIES ARE NOT PREVENTED

## 2024-08-25 LAB
BACTERIA SPEC CULT: NORMAL
GRAM STN SPEC: NORMAL
GRAM STN SPEC: NORMAL
SERVICE CMNT-IMP: NORMAL

## 2024-08-25 PROCEDURE — 6370000000 HC RX 637 (ALT 250 FOR IP): Performed by: INTERNAL MEDICINE

## 2024-08-25 PROCEDURE — 6370000000 HC RX 637 (ALT 250 FOR IP): Performed by: HOSPITALIST

## 2024-08-25 PROCEDURE — 6360000002 HC RX W HCPCS: Performed by: INTERNAL MEDICINE

## 2024-08-25 PROCEDURE — 99232 SBSQ HOSP IP/OBS MODERATE 35: CPT | Performed by: NEUROLOGICAL SURGERY

## 2024-08-25 PROCEDURE — 2500000003 HC RX 250 WO HCPCS: Performed by: NURSE PRACTITIONER

## 2024-08-25 PROCEDURE — 1100000000 HC RM PRIVATE

## 2024-08-25 PROCEDURE — 2580000003 HC RX 258: Performed by: INTERNAL MEDICINE

## 2024-08-25 RX ORDER — OXYCODONE AND ACETAMINOPHEN 5; 325 MG/1; MG/1
1 TABLET ORAL EVERY 8 HOURS PRN
Status: DISCONTINUED | OUTPATIENT
Start: 2024-08-25 | End: 2024-09-01 | Stop reason: HOSPADM

## 2024-08-25 RX ADMIN — SODIUM CHLORIDE, PRESERVATIVE FREE 5 ML: 5 INJECTION INTRAVENOUS at 09:38

## 2024-08-25 RX ADMIN — GUAIFENESIN 200 MG: 200 SOLUTION ORAL at 20:40

## 2024-08-25 RX ADMIN — PANTOPRAZOLE SODIUM 40 MG: 40 TABLET, DELAYED RELEASE ORAL at 09:37

## 2024-08-25 RX ADMIN — SODIUM CHLORIDE, PRESERVATIVE FREE 10 ML: 5 INJECTION INTRAVENOUS at 20:41

## 2024-08-25 RX ADMIN — BUPROPION HYDROCHLORIDE 75 MG: 75 TABLET, FILM COATED ORAL at 09:37

## 2024-08-25 RX ADMIN — FUROSEMIDE 20 MG: 20 TABLET ORAL at 09:37

## 2024-08-25 RX ADMIN — OXYCODONE HYDROCHLORIDE AND ACETAMINOPHEN 1 TABLET: 5; 325 TABLET ORAL at 10:21

## 2024-08-25 RX ADMIN — ESCITALOPRAM OXALATE 20 MG: 10 TABLET ORAL at 09:37

## 2024-08-25 RX ADMIN — OXYCODONE HYDROCHLORIDE AND ACETAMINOPHEN 1 TABLET: 5; 325 TABLET ORAL at 20:40

## 2024-08-25 RX ADMIN — WATER 1000 MG: 1 INJECTION INTRAMUSCULAR; INTRAVENOUS; SUBCUTANEOUS at 16:33

## 2024-08-25 ASSESSMENT — PAIN DESCRIPTION - FREQUENCY: FREQUENCY: CONTINUOUS

## 2024-08-25 ASSESSMENT — PAIN DESCRIPTION - LOCATION
LOCATION: NECK;SHOULDER
LOCATION: SHOULDER

## 2024-08-25 ASSESSMENT — PAIN DESCRIPTION - DESCRIPTORS
DESCRIPTORS: ACHING
DESCRIPTORS: ACHING

## 2024-08-25 ASSESSMENT — PAIN SCALES - GENERAL
PAINLEVEL_OUTOF10: 6
PAINLEVEL_OUTOF10: 0
PAINLEVEL_OUTOF10: 4
PAINLEVEL_OUTOF10: 4
PAINLEVEL_OUTOF10: 7

## 2024-08-25 ASSESSMENT — PAIN DESCRIPTION - ORIENTATION: ORIENTATION: LEFT

## 2024-08-25 ASSESSMENT — PAIN DESCRIPTION - ONSET: ONSET: ON-GOING

## 2024-08-25 ASSESSMENT — PAIN - FUNCTIONAL ASSESSMENT: PAIN_FUNCTIONAL_ASSESSMENT: PREVENTS OR INTERFERES SOME ACTIVE ACTIVITIES AND ADLS

## 2024-08-25 ASSESSMENT — PAIN DESCRIPTION - PAIN TYPE: TYPE: CHRONIC PAIN

## 2024-08-26 ENCOUNTER — APPOINTMENT (OUTPATIENT)
Dept: INTERVENTIONAL RADIOLOGY/VASCULAR | Age: 89
DRG: 056 | End: 2024-08-26
Attending: NEUROLOGICAL SURGERY
Payer: MEDICARE

## 2024-08-26 LAB
BACTERIA SPEC CULT: NORMAL
GRAM STN SPEC: NORMAL
GRAM STN SPEC: NORMAL
SERVICE CMNT-IMP: NORMAL

## 2024-08-26 PROCEDURE — 97530 THERAPEUTIC ACTIVITIES: CPT

## 2024-08-26 PROCEDURE — 83520 IMMUNOASSAY QUANT NOS NONAB: CPT

## 2024-08-26 PROCEDURE — 1100000000 HC RM PRIVATE

## 2024-08-26 PROCEDURE — 2500000003 HC RX 250 WO HCPCS: Performed by: NURSE PRACTITIONER

## 2024-08-26 PROCEDURE — 6370000000 HC RX 637 (ALT 250 FOR IP): Performed by: HOSPITALIST

## 2024-08-26 PROCEDURE — 6370000000 HC RX 637 (ALT 250 FOR IP): Performed by: INTERNAL MEDICINE

## 2024-08-26 PROCEDURE — 2709999900 IR LUMBAR PUNCTURE FOR DIAGNOSIS

## 2024-08-26 PROCEDURE — 2580000003 HC RX 258: Performed by: INTERNAL MEDICINE

## 2024-08-26 PROCEDURE — 97164 PT RE-EVAL EST PLAN CARE: CPT

## 2024-08-26 PROCEDURE — 97168 OT RE-EVAL EST PLAN CARE: CPT

## 2024-08-26 PROCEDURE — 99232 SBSQ HOSP IP/OBS MODERATE 35: CPT | Performed by: PSYCHIATRY & NEUROLOGY

## 2024-08-26 PROCEDURE — 76000 FLUOROSCOPY <1 HR PHYS/QHP: CPT

## 2024-08-26 PROCEDURE — 2500000003 HC RX 250 WO HCPCS: Performed by: NEUROLOGICAL SURGERY

## 2024-08-26 PROCEDURE — 6360000004 HC RX CONTRAST MEDICATION: Performed by: NEUROLOGICAL SURGERY

## 2024-08-26 PROCEDURE — 97130 THER IVNTJ EA ADDL 15 MIN: CPT

## 2024-08-26 PROCEDURE — 2709999900 IR FLUOROSCOPY LESS THAN 1 HOUR

## 2024-08-26 PROCEDURE — 97535 SELF CARE MNGMENT TRAINING: CPT

## 2024-08-26 PROCEDURE — 36415 COLL VENOUS BLD VENIPUNCTURE: CPT

## 2024-08-26 PROCEDURE — 97112 NEUROMUSCULAR REEDUCATION: CPT

## 2024-08-26 PROCEDURE — 92523 SPEECH SOUND LANG COMPREHEN: CPT

## 2024-08-26 PROCEDURE — 009U3ZX DRAINAGE OF SPINAL CANAL, PERCUTANEOUS APPROACH, DIAGNOSTIC: ICD-10-PCS | Performed by: RADIOLOGY

## 2024-08-26 PROCEDURE — 62328 DX LMBR SPI PNXR W/FLUOR/CT: CPT | Performed by: RADIOLOGY

## 2024-08-26 PROCEDURE — 6360000002 HC RX W HCPCS: Performed by: INTERNAL MEDICINE

## 2024-08-26 PROCEDURE — 97129 THER IVNTJ 1ST 15 MIN: CPT

## 2024-08-26 RX ORDER — IOPAMIDOL 408 MG/ML
INJECTION, SOLUTION INTRATHECAL PRN
Status: COMPLETED | OUTPATIENT
Start: 2024-08-26 | End: 2024-08-26

## 2024-08-26 RX ORDER — LIDOCAINE HYDROCHLORIDE 10 MG/ML
INJECTION, SOLUTION EPIDURAL; INFILTRATION; INTRACAUDAL; PERINEURAL PRN
Status: COMPLETED | OUTPATIENT
Start: 2024-08-26 | End: 2024-08-26

## 2024-08-26 RX ADMIN — SODIUM CHLORIDE, PRESERVATIVE FREE 5 ML: 5 INJECTION INTRAVENOUS at 21:15

## 2024-08-26 RX ADMIN — OXYCODONE HYDROCHLORIDE AND ACETAMINOPHEN 1 TABLET: 5; 325 TABLET ORAL at 21:15

## 2024-08-26 RX ADMIN — IOPAMIDOL 6 ML: 408 INJECTION, SOLUTION INTRATHECAL at 11:48

## 2024-08-26 RX ADMIN — LIDOCAINE HYDROCHLORIDE 8 ML: 10 INJECTION, SOLUTION EPIDURAL; INFILTRATION; INTRACAUDAL; PERINEURAL at 11:49

## 2024-08-26 RX ADMIN — OXYCODONE HYDROCHLORIDE AND ACETAMINOPHEN 1 TABLET: 5; 325 TABLET ORAL at 12:36

## 2024-08-26 RX ADMIN — GUAIFENESIN 200 MG: 200 SOLUTION ORAL at 12:43

## 2024-08-26 RX ADMIN — PANTOPRAZOLE SODIUM 40 MG: 40 TABLET, DELAYED RELEASE ORAL at 12:36

## 2024-08-26 RX ADMIN — FUROSEMIDE 20 MG: 20 TABLET ORAL at 12:36

## 2024-08-26 RX ADMIN — SODIUM CHLORIDE, PRESERVATIVE FREE 5 ML: 5 INJECTION INTRAVENOUS at 12:44

## 2024-08-26 RX ADMIN — BUPROPION HYDROCHLORIDE 75 MG: 75 TABLET, FILM COATED ORAL at 12:36

## 2024-08-26 RX ADMIN — ESCITALOPRAM OXALATE 20 MG: 10 TABLET ORAL at 12:36

## 2024-08-26 RX ADMIN — WATER 1000 MG: 1 INJECTION INTRAMUSCULAR; INTRAVENOUS; SUBCUTANEOUS at 16:04

## 2024-08-26 ASSESSMENT — PAIN SCALES - WONG BAKER
WONGBAKER_NUMERICALRESPONSE: NO HURT

## 2024-08-26 ASSESSMENT — PAIN DESCRIPTION - FREQUENCY: FREQUENCY: INTERMITTENT

## 2024-08-26 ASSESSMENT — PAIN SCALES - GENERAL
PAINLEVEL_OUTOF10: 7
PAINLEVEL_OUTOF10: 5
PAINLEVEL_OUTOF10: 0
PAINLEVEL_OUTOF10: 7
PAINLEVEL_OUTOF10: 0
PAINLEVEL_OUTOF10: 0

## 2024-08-26 ASSESSMENT — PAIN DESCRIPTION - LOCATION
LOCATION: SHOULDER
LOCATION: SHOULDER

## 2024-08-26 ASSESSMENT — PAIN DESCRIPTION - ORIENTATION
ORIENTATION: RIGHT
ORIENTATION: RIGHT;LEFT

## 2024-08-26 ASSESSMENT — PAIN DESCRIPTION - DESCRIPTORS: DESCRIPTORS: ACHING

## 2024-08-26 ASSESSMENT — PAIN DESCRIPTION - ONSET: ONSET: GRADUAL

## 2024-08-26 ASSESSMENT — PAIN - FUNCTIONAL ASSESSMENT: PAIN_FUNCTIONAL_ASSESSMENT: ACTIVITIES ARE NOT PREVENTED

## 2024-08-26 ASSESSMENT — PAIN DESCRIPTION - PAIN TYPE: TYPE: ACUTE PAIN

## 2024-08-27 LAB
ANION GAP SERPL CALC-SCNC: 10 MMOL/L (ref 9–18)
BASOPHILS # BLD: 0.1 K/UL (ref 0–0.2)
BASOPHILS NFR BLD: 1 % (ref 0–2)
BUN SERPL-MCNC: 18 MG/DL (ref 8–23)
CALCIUM SERPL-MCNC: 9.1 MG/DL (ref 8.8–10.2)
CHLORIDE SERPL-SCNC: 98 MMOL/L (ref 98–107)
CO2 SERPL-SCNC: 28 MMOL/L (ref 20–28)
CREAT SERPL-MCNC: 0.86 MG/DL (ref 0.8–1.3)
DIFFERENTIAL METHOD BLD: ABNORMAL
EOSINOPHIL # BLD: 0.5 K/UL (ref 0–0.8)
EOSINOPHIL NFR BLD: 7 % (ref 0.5–7.8)
ERYTHROCYTE [DISTWIDTH] IN BLOOD BY AUTOMATED COUNT: 18.2 % (ref 11.9–14.6)
GLUCOSE SERPL-MCNC: 102 MG/DL (ref 70–99)
HCT VFR BLD AUTO: 32.4 % (ref 41.1–50.3)
HGB BLD-MCNC: 9.9 G/DL (ref 13.6–17.2)
IMM GRANULOCYTES # BLD AUTO: 0 K/UL (ref 0–0.5)
IMM GRANULOCYTES NFR BLD AUTO: 0 % (ref 0–5)
LYMPHOCYTES # BLD: 1.3 K/UL (ref 0.5–4.6)
LYMPHOCYTES NFR BLD: 19 % (ref 13–44)
MCH RBC QN AUTO: 28.7 PG (ref 26.1–32.9)
MCHC RBC AUTO-ENTMCNC: 30.6 G/DL (ref 31.4–35)
MCV RBC AUTO: 93.9 FL (ref 82–102)
MONOCYTES # BLD: 0.6 K/UL (ref 0.1–1.3)
MONOCYTES NFR BLD: 8 % (ref 4–12)
NEUTS SEG # BLD: 4.6 K/UL (ref 1.7–8.2)
NEUTS SEG NFR BLD: 65 % (ref 43–78)
NRBC # BLD: 0 K/UL (ref 0–0.2)
PLATELET # BLD AUTO: 278 K/UL (ref 150–450)
PMV BLD AUTO: 9.4 FL (ref 9.4–12.3)
POTASSIUM SERPL-SCNC: 3.7 MMOL/L (ref 3.5–5.1)
RBC # BLD AUTO: 3.45 M/UL (ref 4.23–5.6)
SODIUM SERPL-SCNC: 136 MMOL/L (ref 136–145)
WBC # BLD AUTO: 7.1 K/UL (ref 4.3–11.1)

## 2024-08-27 PROCEDURE — 97535 SELF CARE MNGMENT TRAINING: CPT

## 2024-08-27 PROCEDURE — 6360000002 HC RX W HCPCS: Performed by: HOSPITALIST

## 2024-08-27 PROCEDURE — 97130 THER IVNTJ EA ADDL 15 MIN: CPT

## 2024-08-27 PROCEDURE — 36415 COLL VENOUS BLD VENIPUNCTURE: CPT

## 2024-08-27 PROCEDURE — 6370000000 HC RX 637 (ALT 250 FOR IP): Performed by: INTERNAL MEDICINE

## 2024-08-27 PROCEDURE — 97129 THER IVNTJ 1ST 15 MIN: CPT

## 2024-08-27 PROCEDURE — 80048 BASIC METABOLIC PNL TOTAL CA: CPT

## 2024-08-27 PROCEDURE — 1100000000 HC RM PRIVATE

## 2024-08-27 PROCEDURE — 6370000000 HC RX 637 (ALT 250 FOR IP): Performed by: HOSPITALIST

## 2024-08-27 PROCEDURE — 97112 NEUROMUSCULAR REEDUCATION: CPT

## 2024-08-27 PROCEDURE — 97530 THERAPEUTIC ACTIVITIES: CPT

## 2024-08-27 PROCEDURE — 85025 COMPLETE CBC W/AUTO DIFF WBC: CPT

## 2024-08-27 PROCEDURE — 6360000002 HC RX W HCPCS: Performed by: INTERNAL MEDICINE

## 2024-08-27 PROCEDURE — 2580000003 HC RX 258: Performed by: INTERNAL MEDICINE

## 2024-08-27 RX ADMIN — SODIUM CHLORIDE, PRESERVATIVE FREE 10 ML: 5 INJECTION INTRAVENOUS at 19:42

## 2024-08-27 RX ADMIN — ESCITALOPRAM OXALATE 20 MG: 10 TABLET ORAL at 07:49

## 2024-08-27 RX ADMIN — BUPROPION HYDROCHLORIDE 75 MG: 75 TABLET, FILM COATED ORAL at 07:48

## 2024-08-27 RX ADMIN — PANTOPRAZOLE SODIUM 40 MG: 40 TABLET, DELAYED RELEASE ORAL at 07:48

## 2024-08-27 RX ADMIN — OXYCODONE HYDROCHLORIDE AND ACETAMINOPHEN 1 TABLET: 5; 325 TABLET ORAL at 19:38

## 2024-08-27 RX ADMIN — OXYCODONE HYDROCHLORIDE AND ACETAMINOPHEN 1 TABLET: 5; 325 TABLET ORAL at 07:53

## 2024-08-27 RX ADMIN — ENOXAPARIN SODIUM 40 MG: 100 INJECTION SUBCUTANEOUS at 21:05

## 2024-08-27 RX ADMIN — FUROSEMIDE 20 MG: 20 TABLET ORAL at 07:48

## 2024-08-27 RX ADMIN — SODIUM CHLORIDE, PRESERVATIVE FREE 5 ML: 5 INJECTION INTRAVENOUS at 07:53

## 2024-08-27 RX ADMIN — WATER 1000 MG: 1 INJECTION INTRAMUSCULAR; INTRAVENOUS; SUBCUTANEOUS at 16:03

## 2024-08-27 ASSESSMENT — PAIN DESCRIPTION - DESCRIPTORS: DESCRIPTORS: ACHING

## 2024-08-27 ASSESSMENT — PAIN DESCRIPTION - FREQUENCY: FREQUENCY: CONTINUOUS

## 2024-08-27 ASSESSMENT — PAIN DESCRIPTION - ORIENTATION: ORIENTATION: RIGHT

## 2024-08-27 ASSESSMENT — PAIN SCALES - GENERAL
PAINLEVEL_OUTOF10: 1
PAINLEVEL_OUTOF10: 5
PAINLEVEL_OUTOF10: 0
PAINLEVEL_OUTOF10: 5

## 2024-08-27 ASSESSMENT — PAIN DESCRIPTION - LOCATION
LOCATION: GENERALIZED
LOCATION: SHOULDER

## 2024-08-27 ASSESSMENT — PAIN DESCRIPTION - PAIN TYPE: TYPE: CHRONIC PAIN

## 2024-08-27 ASSESSMENT — PAIN - FUNCTIONAL ASSESSMENT
PAIN_FUNCTIONAL_ASSESSMENT: ACTIVITIES ARE NOT PREVENTED
PAIN_FUNCTIONAL_ASSESSMENT: PREVENTS OR INTERFERES SOME ACTIVE ACTIVITIES AND ADLS

## 2024-08-27 ASSESSMENT — PAIN DESCRIPTION - ONSET: ONSET: GRADUAL

## 2024-08-28 ENCOUNTER — APPOINTMENT (OUTPATIENT)
Dept: GENERAL RADIOLOGY | Age: 89
DRG: 056 | End: 2024-08-28
Attending: STUDENT IN AN ORGANIZED HEALTH CARE EDUCATION/TRAINING PROGRAM
Payer: MEDICARE

## 2024-08-28 PROCEDURE — 6370000000 HC RX 637 (ALT 250 FOR IP): Performed by: HOSPITALIST

## 2024-08-28 PROCEDURE — 6370000000 HC RX 637 (ALT 250 FOR IP): Performed by: INTERNAL MEDICINE

## 2024-08-28 PROCEDURE — 2500000003 HC RX 250 WO HCPCS: Performed by: NURSE PRACTITIONER

## 2024-08-28 PROCEDURE — 2580000003 HC RX 258: Performed by: INTERNAL MEDICINE

## 2024-08-28 PROCEDURE — 71045 X-RAY EXAM CHEST 1 VIEW: CPT

## 2024-08-28 PROCEDURE — 1100000000 HC RM PRIVATE

## 2024-08-28 PROCEDURE — 99231 SBSQ HOSP IP/OBS SF/LOW 25: CPT | Performed by: NURSE PRACTITIONER

## 2024-08-28 RX ORDER — MORPHINE SULFATE 2 MG/ML
2 INJECTION, SOLUTION INTRAMUSCULAR; INTRAVENOUS EVERY 4 HOURS PRN
Status: DISCONTINUED | OUTPATIENT
Start: 2024-08-28 | End: 2024-09-01 | Stop reason: HOSPADM

## 2024-08-28 RX ADMIN — ESCITALOPRAM OXALATE 20 MG: 10 TABLET ORAL at 09:00

## 2024-08-28 RX ADMIN — SODIUM CHLORIDE, PRESERVATIVE FREE 10 ML: 5 INJECTION INTRAVENOUS at 21:42

## 2024-08-28 RX ADMIN — GUAIFENESIN 200 MG: 200 SOLUTION ORAL at 04:38

## 2024-08-28 RX ADMIN — SODIUM CHLORIDE, PRESERVATIVE FREE 10 ML: 5 INJECTION INTRAVENOUS at 09:00

## 2024-08-28 RX ADMIN — OXYCODONE HYDROCHLORIDE AND ACETAMINOPHEN 1 TABLET: 5; 325 TABLET ORAL at 12:55

## 2024-08-28 RX ADMIN — BUPROPION HYDROCHLORIDE 75 MG: 75 TABLET, FILM COATED ORAL at 09:00

## 2024-08-28 RX ADMIN — FUROSEMIDE 20 MG: 20 TABLET ORAL at 09:00

## 2024-08-28 RX ADMIN — OXYCODONE HYDROCHLORIDE AND ACETAMINOPHEN 1 TABLET: 5; 325 TABLET ORAL at 21:24

## 2024-08-28 RX ADMIN — PANTOPRAZOLE SODIUM 40 MG: 40 TABLET, DELAYED RELEASE ORAL at 09:00

## 2024-08-28 ASSESSMENT — PAIN SCALES - GENERAL
PAINLEVEL_OUTOF10: 7
PAINLEVEL_OUTOF10: 7
PAINLEVEL_OUTOF10: 0
PAINLEVEL_OUTOF10: 2

## 2024-08-28 ASSESSMENT — PAIN DESCRIPTION - FREQUENCY
FREQUENCY: INTERMITTENT
FREQUENCY: CONTINUOUS

## 2024-08-28 ASSESSMENT — PAIN DESCRIPTION - PAIN TYPE
TYPE: CHRONIC PAIN
TYPE: CHRONIC PAIN

## 2024-08-28 ASSESSMENT — PAIN DESCRIPTION - DESCRIPTORS
DESCRIPTORS: ACHING
DESCRIPTORS: DISCOMFORT;SORE

## 2024-08-28 ASSESSMENT — PAIN DESCRIPTION - ONSET
ONSET: ON-GOING
ONSET: ON-GOING

## 2024-08-28 ASSESSMENT — PAIN DESCRIPTION - ORIENTATION
ORIENTATION: ANTERIOR
ORIENTATION: OTHER (COMMENT)

## 2024-08-28 ASSESSMENT — PAIN DESCRIPTION - LOCATION
LOCATION: HEAD;NECK
LOCATION: OTHER (COMMENT)

## 2024-08-29 ENCOUNTER — APPOINTMENT (OUTPATIENT)
Dept: INTERVENTIONAL RADIOLOGY/VASCULAR | Age: 89
DRG: 056 | End: 2024-08-29
Attending: NEUROLOGICAL SURGERY
Payer: MEDICARE

## 2024-08-29 LAB
Lab: NORMAL
Lab: NORMAL
REFERENCE LAB: NORMAL

## 2024-08-29 PROCEDURE — 6360000002 HC RX W HCPCS: Performed by: HOSPITALIST

## 2024-08-29 PROCEDURE — 2580000003 HC RX 258: Performed by: INTERNAL MEDICINE

## 2024-08-29 PROCEDURE — 62328 DX LMBR SPI PNXR W/FLUOR/CT: CPT | Performed by: RADIOLOGY

## 2024-08-29 PROCEDURE — 2500000003 HC RX 250 WO HCPCS: Performed by: RADIOLOGY

## 2024-08-29 PROCEDURE — 6370000000 HC RX 637 (ALT 250 FOR IP): Performed by: HOSPITALIST

## 2024-08-29 PROCEDURE — 1100000000 HC RM PRIVATE

## 2024-08-29 PROCEDURE — 6360000004 HC RX CONTRAST MEDICATION: Performed by: NEUROLOGICAL SURGERY

## 2024-08-29 PROCEDURE — 62328 DX LMBR SPI PNXR W/FLUOR/CT: CPT

## 2024-08-29 PROCEDURE — 2709999900 IR LUMBAR PUNCTURE FOR DIAGNOSIS

## 2024-08-29 PROCEDURE — 6370000000 HC RX 637 (ALT 250 FOR IP): Performed by: INTERNAL MEDICINE

## 2024-08-29 PROCEDURE — 009U3ZX DRAINAGE OF SPINAL CANAL, PERCUTANEOUS APPROACH, DIAGNOSTIC: ICD-10-PCS | Performed by: NEUROLOGICAL SURGERY

## 2024-08-29 RX ORDER — IOPAMIDOL 408 MG/ML
INJECTION, SOLUTION INTRATHECAL PRN
Status: COMPLETED | OUTPATIENT
Start: 2024-08-29 | End: 2024-08-29

## 2024-08-29 RX ORDER — LIDOCAINE HYDROCHLORIDE 20 MG/ML
INJECTION, SOLUTION INFILTRATION; PERINEURAL PRN
Status: COMPLETED | OUTPATIENT
Start: 2024-08-29 | End: 2024-08-29

## 2024-08-29 RX ADMIN — ENOXAPARIN SODIUM 40 MG: 100 INJECTION SUBCUTANEOUS at 20:54

## 2024-08-29 RX ADMIN — LIDOCAINE HYDROCHLORIDE 7 ML: 20 INJECTION, SOLUTION INFILTRATION; PERINEURAL at 14:07

## 2024-08-29 RX ADMIN — IOPAMIDOL 2 ML: 408 INJECTION, SOLUTION INTRATHECAL at 14:21

## 2024-08-29 RX ADMIN — BUPROPION HYDROCHLORIDE 75 MG: 75 TABLET, FILM COATED ORAL at 08:23

## 2024-08-29 RX ADMIN — FUROSEMIDE 20 MG: 20 TABLET ORAL at 08:23

## 2024-08-29 RX ADMIN — MORPHINE SULFATE 2 MG: 2 INJECTION, SOLUTION INTRAMUSCULAR; INTRAVENOUS at 15:57

## 2024-08-29 RX ADMIN — SODIUM CHLORIDE, PRESERVATIVE FREE 10 ML: 5 INJECTION INTRAVENOUS at 20:55

## 2024-08-29 RX ADMIN — OXYCODONE HYDROCHLORIDE AND ACETAMINOPHEN 1 TABLET: 5; 325 TABLET ORAL at 18:35

## 2024-08-29 RX ADMIN — ESCITALOPRAM OXALATE 20 MG: 10 TABLET ORAL at 08:23

## 2024-08-29 RX ADMIN — PANTOPRAZOLE SODIUM 40 MG: 40 TABLET, DELAYED RELEASE ORAL at 08:23

## 2024-08-29 RX ADMIN — SODIUM CHLORIDE, PRESERVATIVE FREE 10 ML: 5 INJECTION INTRAVENOUS at 08:23

## 2024-08-29 RX ADMIN — OXYCODONE HYDROCHLORIDE AND ACETAMINOPHEN 1 TABLET: 5; 325 TABLET ORAL at 10:13

## 2024-08-29 ASSESSMENT — PAIN DESCRIPTION - LOCATION
LOCATION: NECK;HEAD
LOCATION: HEAD;NECK
LOCATION: NECK

## 2024-08-29 ASSESSMENT — PAIN SCALES - GENERAL
PAINLEVEL_OUTOF10: 3
PAINLEVEL_OUTOF10: 0
PAINLEVEL_OUTOF10: 7
PAINLEVEL_OUTOF10: 6
PAINLEVEL_OUTOF10: 6

## 2024-08-29 ASSESSMENT — PAIN DESCRIPTION - DESCRIPTORS
DESCRIPTORS: ACHING
DESCRIPTORS: ACHING

## 2024-08-29 ASSESSMENT — PAIN SCALES - WONG BAKER: WONGBAKER_NUMERICALRESPONSE: NO HURT

## 2024-08-29 NOTE — PROCEDURES
[facility]    Dr. Muñiz attempted for a reinsertion of the lumbar drain at a higher location than attempted on Monday.  According to Dr. Muñiz the attempt was unsuccessful.  At this time it is felt that the patient would likely not benefit from a shunt given his increased dementia like symptoms and the fact that after his lumbar punctures he was only able to sit up not able to ambulate.  At this time Dr. Muñiz feels the patient is clear to be referred to rehab.    Patient Vitals for the past 12 hrs:   Temp Pulse Resp BP SpO2   08/29/24 1322 98 °F (36.7 °C) 78 16 (!) 104/54 91 %   08/29/24 1043 -- -- 16 -- --   08/29/24 0732 97.5 °F (36.4 °C) 77 16 120/79 90 %        No results found for this or any previous visit (from the past 24 hour(s)).     Vandana Machuca, APRN - CNP

## 2024-08-30 PROCEDURE — 97535 SELF CARE MNGMENT TRAINING: CPT

## 2024-08-30 PROCEDURE — 6370000000 HC RX 637 (ALT 250 FOR IP): Performed by: INTERNAL MEDICINE

## 2024-08-30 PROCEDURE — 2580000003 HC RX 258: Performed by: INTERNAL MEDICINE

## 2024-08-30 PROCEDURE — 1100000000 HC RM PRIVATE

## 2024-08-30 PROCEDURE — 97112 NEUROMUSCULAR REEDUCATION: CPT

## 2024-08-30 PROCEDURE — 6360000002 HC RX W HCPCS: Performed by: HOSPITALIST

## 2024-08-30 PROCEDURE — 6370000000 HC RX 637 (ALT 250 FOR IP): Performed by: HOSPITALIST

## 2024-08-30 RX ORDER — MEMANTINE HYDROCHLORIDE 5 MG/1
5 TABLET ORAL DAILY
Status: DISCONTINUED | OUTPATIENT
Start: 2024-08-30 | End: 2024-09-01 | Stop reason: HOSPADM

## 2024-08-30 RX ADMIN — OXYCODONE HYDROCHLORIDE AND ACETAMINOPHEN 1 TABLET: 5; 325 TABLET ORAL at 08:56

## 2024-08-30 RX ADMIN — PANTOPRAZOLE SODIUM 40 MG: 40 TABLET, DELAYED RELEASE ORAL at 08:56

## 2024-08-30 RX ADMIN — BUPROPION HYDROCHLORIDE 75 MG: 75 TABLET, FILM COATED ORAL at 08:56

## 2024-08-30 RX ADMIN — ESCITALOPRAM OXALATE 20 MG: 10 TABLET ORAL at 08:56

## 2024-08-30 RX ADMIN — ENOXAPARIN SODIUM 40 MG: 100 INJECTION SUBCUTANEOUS at 20:58

## 2024-08-30 RX ADMIN — SODIUM CHLORIDE, PRESERVATIVE FREE 10 ML: 5 INJECTION INTRAVENOUS at 21:01

## 2024-08-30 RX ADMIN — MEMANTINE 5 MG: 5 TABLET ORAL at 12:39

## 2024-08-30 RX ADMIN — SODIUM CHLORIDE, PRESERVATIVE FREE 10 ML: 5 INJECTION INTRAVENOUS at 08:58

## 2024-08-30 RX ADMIN — OXYCODONE HYDROCHLORIDE AND ACETAMINOPHEN 1 TABLET: 5; 325 TABLET ORAL at 20:58

## 2024-08-30 RX ADMIN — FUROSEMIDE 20 MG: 20 TABLET ORAL at 08:56

## 2024-08-30 ASSESSMENT — PAIN SCALES - GENERAL
PAINLEVEL_OUTOF10: 8
PAINLEVEL_OUTOF10: 3
PAINLEVEL_OUTOF10: 2
PAINLEVEL_OUTOF10: 6
PAINLEVEL_OUTOF10: 4

## 2024-08-30 ASSESSMENT — PAIN DESCRIPTION - LOCATION
LOCATION: SHOULDER;BACK
LOCATION: HEAD
LOCATION: HEAD

## 2024-08-30 ASSESSMENT — PAIN DESCRIPTION - PAIN TYPE
TYPE: CHRONIC PAIN
TYPE: ACUTE PAIN

## 2024-08-30 ASSESSMENT — PAIN - FUNCTIONAL ASSESSMENT
PAIN_FUNCTIONAL_ASSESSMENT: ACTIVITIES ARE NOT PREVENTED
PAIN_FUNCTIONAL_ASSESSMENT: ACTIVITIES ARE NOT PREVENTED

## 2024-08-30 ASSESSMENT — PAIN DESCRIPTION - ORIENTATION
ORIENTATION: LEFT;MID;LOWER
ORIENTATION: MID

## 2024-08-30 ASSESSMENT — PAIN DESCRIPTION - DESCRIPTORS
DESCRIPTORS: ACHING;DISCOMFORT;SORE
DESCRIPTORS: ACHING;BURNING
DESCRIPTORS: ACHING

## 2024-08-30 ASSESSMENT — PAIN DESCRIPTION - ONSET
ONSET: ON-GOING
ONSET: ON-GOING

## 2024-08-30 ASSESSMENT — PAIN DESCRIPTION - FREQUENCY
FREQUENCY: CONTINUOUS
FREQUENCY: CONTINUOUS

## 2024-08-31 LAB
ANION GAP SERPL CALC-SCNC: 12 MMOL/L (ref 9–18)
BASOPHILS # BLD: 0.1 K/UL (ref 0–0.2)
BASOPHILS NFR BLD: 1 % (ref 0–2)
BUN SERPL-MCNC: 18 MG/DL (ref 8–23)
CALCIUM SERPL-MCNC: 9.2 MG/DL (ref 8.8–10.2)
CHLORIDE SERPL-SCNC: 98 MMOL/L (ref 98–107)
CO2 SERPL-SCNC: 28 MMOL/L (ref 20–28)
CREAT SERPL-MCNC: 0.82 MG/DL (ref 0.8–1.3)
DIFFERENTIAL METHOD BLD: ABNORMAL
EOSINOPHIL # BLD: 0.6 K/UL (ref 0–0.8)
EOSINOPHIL NFR BLD: 9 % (ref 0.5–7.8)
ERYTHROCYTE [DISTWIDTH] IN BLOOD BY AUTOMATED COUNT: 18.1 % (ref 11.9–14.6)
GLUCOSE SERPL-MCNC: 100 MG/DL (ref 70–99)
HCT VFR BLD AUTO: 31.8 % (ref 41.1–50.3)
HGB BLD-MCNC: 9.8 G/DL (ref 13.6–17.2)
IMM GRANULOCYTES # BLD AUTO: 0 K/UL (ref 0–0.5)
IMM GRANULOCYTES NFR BLD AUTO: 0 % (ref 0–5)
LYMPHOCYTES # BLD: 1.5 K/UL (ref 0.5–4.6)
LYMPHOCYTES NFR BLD: 22 % (ref 13–44)
MCH RBC QN AUTO: 28.4 PG (ref 26.1–32.9)
MCHC RBC AUTO-ENTMCNC: 30.8 G/DL (ref 31.4–35)
MCV RBC AUTO: 92.2 FL (ref 82–102)
MONOCYTES # BLD: 0.6 K/UL (ref 0.1–1.3)
MONOCYTES NFR BLD: 9 % (ref 4–12)
NEUTS SEG # BLD: 4 K/UL (ref 1.7–8.2)
NEUTS SEG NFR BLD: 59 % (ref 43–78)
NRBC # BLD: 0 K/UL (ref 0–0.2)
PLATELET # BLD AUTO: 254 K/UL (ref 150–450)
PMV BLD AUTO: 9.5 FL (ref 9.4–12.3)
POTASSIUM SERPL-SCNC: 3.7 MMOL/L (ref 3.5–5.1)
RBC # BLD AUTO: 3.45 M/UL (ref 4.23–5.6)
SODIUM SERPL-SCNC: 138 MMOL/L (ref 136–145)
WBC # BLD AUTO: 6.8 K/UL (ref 4.3–11.1)

## 2024-08-31 PROCEDURE — 2580000003 HC RX 258: Performed by: INTERNAL MEDICINE

## 2024-08-31 PROCEDURE — 80048 BASIC METABOLIC PNL TOTAL CA: CPT

## 2024-08-31 PROCEDURE — 85025 COMPLETE CBC W/AUTO DIFF WBC: CPT

## 2024-08-31 PROCEDURE — 36415 COLL VENOUS BLD VENIPUNCTURE: CPT

## 2024-08-31 PROCEDURE — 1100000000 HC RM PRIVATE

## 2024-08-31 PROCEDURE — 6370000000 HC RX 637 (ALT 250 FOR IP): Performed by: INTERNAL MEDICINE

## 2024-08-31 PROCEDURE — 6360000002 HC RX W HCPCS: Performed by: HOSPITALIST

## 2024-08-31 PROCEDURE — 6370000000 HC RX 637 (ALT 250 FOR IP): Performed by: HOSPITALIST

## 2024-08-31 RX ADMIN — SODIUM CHLORIDE, PRESERVATIVE FREE 5 ML: 5 INJECTION INTRAVENOUS at 08:14

## 2024-08-31 RX ADMIN — OXYCODONE HYDROCHLORIDE AND ACETAMINOPHEN 1 TABLET: 5; 325 TABLET ORAL at 09:54

## 2024-08-31 RX ADMIN — BUPROPION HYDROCHLORIDE 75 MG: 75 TABLET, FILM COATED ORAL at 08:14

## 2024-08-31 RX ADMIN — ENOXAPARIN SODIUM 40 MG: 100 INJECTION SUBCUTANEOUS at 20:45

## 2024-08-31 RX ADMIN — PANTOPRAZOLE SODIUM 40 MG: 40 TABLET, DELAYED RELEASE ORAL at 08:13

## 2024-08-31 RX ADMIN — MEMANTINE 5 MG: 5 TABLET ORAL at 08:13

## 2024-08-31 RX ADMIN — FUROSEMIDE 20 MG: 20 TABLET ORAL at 08:13

## 2024-08-31 RX ADMIN — OXYCODONE HYDROCHLORIDE AND ACETAMINOPHEN 1 TABLET: 5; 325 TABLET ORAL at 20:45

## 2024-08-31 RX ADMIN — SODIUM CHLORIDE, PRESERVATIVE FREE 10 ML: 5 INJECTION INTRAVENOUS at 20:46

## 2024-08-31 RX ADMIN — ESCITALOPRAM OXALATE 20 MG: 10 TABLET ORAL at 08:13

## 2024-08-31 ASSESSMENT — PAIN SCALES - GENERAL
PAINLEVEL_OUTOF10: 2
PAINLEVEL_OUTOF10: 5
PAINLEVEL_OUTOF10: 0
PAINLEVEL_OUTOF10: 7

## 2024-08-31 ASSESSMENT — PAIN DESCRIPTION - ONSET
ONSET: GRADUAL
ONSET: ON-GOING

## 2024-08-31 ASSESSMENT — PAIN DESCRIPTION - PAIN TYPE
TYPE: CHRONIC PAIN
TYPE: CHRONIC PAIN

## 2024-08-31 ASSESSMENT — PAIN DESCRIPTION - FREQUENCY
FREQUENCY: INTERMITTENT
FREQUENCY: CONTINUOUS

## 2024-08-31 ASSESSMENT — PAIN DESCRIPTION - LOCATION
LOCATION: SHOULDER;HEAD
LOCATION: BACK

## 2024-08-31 ASSESSMENT — PAIN - FUNCTIONAL ASSESSMENT: PAIN_FUNCTIONAL_ASSESSMENT: ACTIVITIES ARE NOT PREVENTED

## 2024-08-31 ASSESSMENT — PAIN DESCRIPTION - ORIENTATION
ORIENTATION: LOWER;MID
ORIENTATION: LEFT

## 2024-08-31 ASSESSMENT — PAIN DESCRIPTION - DESCRIPTORS
DESCRIPTORS: DISCOMFORT
DESCRIPTORS: ACHING;DISCOMFORT;SORE

## 2024-09-01 VITALS
HEIGHT: 66 IN | TEMPERATURE: 98.2 F | WEIGHT: 179.9 LBS | DIASTOLIC BLOOD PRESSURE: 75 MMHG | OXYGEN SATURATION: 92 % | HEART RATE: 73 BPM | RESPIRATION RATE: 17 BRPM | BODY MASS INDEX: 28.91 KG/M2 | SYSTOLIC BLOOD PRESSURE: 116 MMHG

## 2024-09-01 LAB
ALBUMIN SERPL-MCNC: 2.7 G/DL (ref 3.2–4.6)
ALBUMIN/GLOB SERPL: 0.7 (ref 1–1.9)
ALP SERPL-CCNC: 97 U/L (ref 40–129)
ALT SERPL-CCNC: 6 U/L (ref 12–65)
ANION GAP SERPL CALC-SCNC: 10 MMOL/L (ref 9–18)
AST SERPL-CCNC: 21 U/L (ref 15–37)
BASOPHILS # BLD: 0.1 K/UL (ref 0–0.2)
BASOPHILS NFR BLD: 1 % (ref 0–2)
BILIRUB SERPL-MCNC: <0.2 MG/DL (ref 0–1.2)
BUN SERPL-MCNC: 21 MG/DL (ref 8–23)
CALCIUM SERPL-MCNC: 9.1 MG/DL (ref 8.8–10.2)
CHLORIDE SERPL-SCNC: 100 MMOL/L (ref 98–107)
CO2 SERPL-SCNC: 30 MMOL/L (ref 20–28)
CREAT SERPL-MCNC: 0.8 MG/DL (ref 0.8–1.3)
DIFFERENTIAL METHOD BLD: ABNORMAL
EOSINOPHIL # BLD: 0.5 K/UL (ref 0–0.8)
EOSINOPHIL NFR BLD: 8 % (ref 0.5–7.8)
ERYTHROCYTE [DISTWIDTH] IN BLOOD BY AUTOMATED COUNT: 17.9 % (ref 11.9–14.6)
GLOBULIN SER CALC-MCNC: 3.6 G/DL (ref 2.3–3.5)
GLUCOSE SERPL-MCNC: 97 MG/DL (ref 70–99)
HCT VFR BLD AUTO: 33.8 % (ref 41.1–50.3)
HGB BLD-MCNC: 10.1 G/DL (ref 13.6–17.2)
IMM GRANULOCYTES # BLD AUTO: 0 K/UL (ref 0–0.5)
IMM GRANULOCYTES NFR BLD AUTO: 0 % (ref 0–5)
LYMPHOCYTES # BLD: 1.5 K/UL (ref 0.5–4.6)
LYMPHOCYTES NFR BLD: 23 % (ref 13–44)
MCH RBC QN AUTO: 28.4 PG (ref 26.1–32.9)
MCHC RBC AUTO-ENTMCNC: 29.9 G/DL (ref 31.4–35)
MCV RBC AUTO: 94.9 FL (ref 82–102)
MONOCYTES # BLD: 0.7 K/UL (ref 0.1–1.3)
MONOCYTES NFR BLD: 10 % (ref 4–12)
NEUTS SEG # BLD: 3.8 K/UL (ref 1.7–8.2)
NEUTS SEG NFR BLD: 58 % (ref 43–78)
NRBC # BLD: 0 K/UL (ref 0–0.2)
PLATELET # BLD AUTO: 273 K/UL (ref 150–450)
PMV BLD AUTO: 9.7 FL (ref 9.4–12.3)
POTASSIUM SERPL-SCNC: 3.5 MMOL/L (ref 3.5–5.1)
PROT SERPL-MCNC: 6.3 G/DL (ref 6.3–8.2)
RBC # BLD AUTO: 3.56 M/UL (ref 4.23–5.6)
SODIUM SERPL-SCNC: 141 MMOL/L (ref 136–145)
WBC # BLD AUTO: 6.6 K/UL (ref 4.3–11.1)

## 2024-09-01 PROCEDURE — 80053 COMPREHEN METABOLIC PANEL: CPT

## 2024-09-01 PROCEDURE — 36415 COLL VENOUS BLD VENIPUNCTURE: CPT

## 2024-09-01 PROCEDURE — 85025 COMPLETE CBC W/AUTO DIFF WBC: CPT

## 2024-09-01 PROCEDURE — 6370000000 HC RX 637 (ALT 250 FOR IP): Performed by: HOSPITALIST

## 2024-09-01 PROCEDURE — 6370000000 HC RX 637 (ALT 250 FOR IP): Performed by: INTERNAL MEDICINE

## 2024-09-01 PROCEDURE — 2580000003 HC RX 258: Performed by: INTERNAL MEDICINE

## 2024-09-01 RX ORDER — OXYCODONE AND ACETAMINOPHEN 5; 325 MG/1; MG/1
1 TABLET ORAL EVERY 8 HOURS PRN
Qty: 9 TABLET | Refills: 0 | Status: SHIPPED | OUTPATIENT
Start: 2024-09-01 | End: 2024-09-04

## 2024-09-01 RX ORDER — ONDANSETRON 4 MG/1
4 TABLET, ORALLY DISINTEGRATING ORAL EVERY 8 HOURS PRN
Qty: 9 TABLET | Refills: 0 | Status: SHIPPED | OUTPATIENT
Start: 2024-09-01 | End: 2024-09-04

## 2024-09-01 RX ORDER — MEMANTINE HYDROCHLORIDE 5 MG/1
5 TABLET ORAL DAILY
Qty: 60 TABLET | Refills: 3 | Status: SHIPPED | OUTPATIENT
Start: 2024-09-02

## 2024-09-01 RX ADMIN — PANTOPRAZOLE SODIUM 40 MG: 40 TABLET, DELAYED RELEASE ORAL at 08:28

## 2024-09-01 RX ADMIN — OXYCODONE HYDROCHLORIDE AND ACETAMINOPHEN 1 TABLET: 5; 325 TABLET ORAL at 08:40

## 2024-09-01 RX ADMIN — BUPROPION HYDROCHLORIDE 75 MG: 75 TABLET, FILM COATED ORAL at 08:28

## 2024-09-01 RX ADMIN — SODIUM CHLORIDE, PRESERVATIVE FREE 10 ML: 5 INJECTION INTRAVENOUS at 08:28

## 2024-09-01 RX ADMIN — ESCITALOPRAM OXALATE 20 MG: 10 TABLET ORAL at 08:28

## 2024-09-01 RX ADMIN — MEMANTINE 5 MG: 5 TABLET ORAL at 08:28

## 2024-09-01 RX ADMIN — FUROSEMIDE 20 MG: 20 TABLET ORAL at 08:28

## 2024-09-01 ASSESSMENT — PAIN DESCRIPTION - DESCRIPTORS: DESCRIPTORS: ACHING

## 2024-09-01 ASSESSMENT — PAIN DESCRIPTION - FREQUENCY: FREQUENCY: CONTINUOUS

## 2024-09-01 ASSESSMENT — PAIN - FUNCTIONAL ASSESSMENT: PAIN_FUNCTIONAL_ASSESSMENT: ACTIVITIES ARE NOT PREVENTED

## 2024-09-01 ASSESSMENT — PAIN DESCRIPTION - PAIN TYPE: TYPE: CHRONIC PAIN

## 2024-09-01 ASSESSMENT — PAIN DESCRIPTION - LOCATION: LOCATION: BACK

## 2024-09-01 ASSESSMENT — PAIN DESCRIPTION - ORIENTATION: ORIENTATION: LOWER;MID

## 2024-09-01 ASSESSMENT — PAIN DESCRIPTION - ONSET: ONSET: ON-GOING

## 2024-09-01 ASSESSMENT — PAIN SCALES - GENERAL: PAINLEVEL_OUTOF10: 5

## 2024-09-01 NOTE — PROGRESS NOTES
[facility]    Discussed case with neurology and  IR to have a lumbar drain placed.  Pt will be admitted for several days and have regular PT and OT sessions during the hospitalization to have the patients progress clearly documented after intervals of draining CSF.  Be sure to coordinate placement of LP drain with Dr. Muñiz and IR for Monday.    Patient Vitals for the past 12 hrs:   Temp Pulse Resp BP SpO2   08/22/24 2106 98.2 °F (36.8 °C) 89 18 110/86 93 %        Recent Results (from the past 24 hour(s))   CBC with Auto Differential    Collection Time: 08/23/24  6:33 AM   Result Value Ref Range    WBC 7.8 4.3 - 11.1 K/uL    RBC 3.71 (L) 4.23 - 5.6 M/uL    Hemoglobin 10.6 (L) 13.6 - 17.2 g/dL    Hematocrit 35.2 (L) 41.1 - 50.3 %    MCV 94.9 82 - 102 FL    MCH 28.6 26.1 - 32.9 PG    MCHC 30.1 (L) 31.4 - 35.0 g/dL    RDW 18.4 (H) 11.9 - 14.6 %    Platelets 291 150 - 450 K/uL    MPV 9.5 9.4 - 12.3 FL    nRBC 0.00 0.0 - 0.2 K/uL    Differential Type AUTOMATED      Neutrophils % 65 43 - 78 %    Lymphocytes % 18 13 - 44 %    Monocytes % 9 4.0 - 12.0 %    Eosinophils % 7 0.5 - 7.8 %    Basophils % 1 0.0 - 2.0 %    Immature Granulocytes % 1 0.0 - 5.0 %    Neutrophils Absolute 5.1 1.7 - 8.2 K/UL    Lymphocytes Absolute 1.4 0.5 - 4.6 K/UL    Monocytes Absolute 0.7 0.1 - 1.3 K/UL    Eosinophils Absolute 0.6 0.0 - 0.8 K/UL    Basophils Absolute 0.1 0.0 - 0.2 K/UL    Immature Granulocytes Absolute 0.0 0.0 - 0.5 K/UL   Basic Metabolic Panel w/ Reflex to MG    Collection Time: 08/23/24  6:33 AM   Result Value Ref Range    Sodium 141 136 - 145 mmol/L    Potassium 3.9 3.5 - 5.1 mmol/L    Chloride 102 98 - 107 mmol/L    CO2 30 (H) 20 - 28 mmol/L    Anion Gap 9 9 - 18 mmol/L    Glucose 103 (H) 70 - 99 mg/dL    BUN 20 8 - 23 MG/DL    Creatinine 0.88 0.80 - 1.30 MG/DL    Est, Glom Filt Rate 83 >60 ml/min/1.73m2    Calcium 8.9 8.8 - 10.2 MG/DL        Vandana Machuca, APRN - CNP    
        [facility]    Assessment:NPH work up    Plan:  -Alzheimer's serum tests sent to lab  -plan to take patient to IR tomorrow to try and replace lumbar drain  -continue to work with therapy  -PT feels patient declined in ability to function yesterday    Subjective:    Objective:  Alert  Answering questions but only oriented to self  Full strength in extremities  Non ambulatory  No bowel or bladder control    Patient Vitals for the past 12 hrs:   Temp Pulse Resp BP SpO2   08/28/24 0744 98.4 °F (36.9 °C) 66 17 119/65 94 %        No results found for this or any previous visit (from the past 24 hour(s)).       Vandana Machuca, APRN - CNP    
       Hospitalist Progress Note   Admit Date:  2024  4:30 PM   Name:  Rogerio Owens   Age:  88 y.o.  Sex:  male  :  1935   MRN:  [id number]   Room:  8/    Presenting/Chief Complaint: No chief complaint on file.     Reason(s) for Admission: NPH (normal pressure hydrocephalus) (HCC) [G91.2]     Hospital Course:   Rogerio Owens is a 88 y.o. 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 He was recently hospitalized more than one week ago for suspected GI bleed and with GI consult recommending conservative care. He had been discharged on oral iron therapy. He has been off Pradaxa since hospital discharge.      Since that time, his spouse says he has been having worsening of his symptoms as detailed above. He had an MRI of his brain that showed dilated ventricles. He has followed neurology at [facility], and at that time, benefits of LP seemed like they did not outweigh the risks sine he was still on anticoagulation.      ED course: Hgb of 9 (similar to previous), CXR shows possible right-sided lung nodule. Given Rocephin for concerns for possible UTI. Hospitalist consulted for admission.       Subjective & 24hr Events:   Patient is resting in bed.  He reports headache is improving.  No fever no chills.  No chest pain or shortness of breath.    Assessment & Plan:     This is a 88-year-old male with:     NPH (normal pressure hydrocephalus) (HCC)  - significant clinical improvement post-lumbar puncture  - Neurosurgery consult for shunt consideration  - PT/OT consults  - will also need outpatient follow-up  -IR and neurosurgery attempted to place lumbar drain today without much success.  CSF was able to be obtained but the catheter would not pass.  He has severe lumbar spinal stenosis that the catheter could not be passed along.  
       Hospitalist Progress Note   Admit Date:  2024  4:30 PM   Name:  Rogerio Owens   Age:  88 y.o.  Sex:  male  :  1935   MRN:  [id number]   Room:  8/    Presenting/Chief Complaint: No chief complaint on file.     Reason(s) for Admission: NPH (normal pressure hydrocephalus) (HCC) [G91.2]     Hospital Course:   Rogerio Owens is a 88 y.o. 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 He was recently hospitalized more than one week ago for suspected GI bleed and with GI consult recommending conservative care. He had been discharged on oral iron therapy. He has been off Pradaxa since hospital discharge.      Since that time, his spouse says he has been having worsening of his symptoms as detailed above. He had an MRI of his brain that showed dilated ventricles. He has followed neurology at [facility], and at that time, benefits of LP seemed like they did not outweigh the risks sine he was still on anticoagulation.      ED course: Hgb of 9 (similar to previous), CXR shows possible right-sided lung nodule. Given Rocephin for concerns for possible UTI. Hospitalist consulted for admission.     Course during the stay  Transferred from [facility] s/p LP for NPH with improvement of improvement of symptoms after LP.Sent for Neurology evaluation and also Neurosurgery evaluation for shunt.Plan to have Lumbar drain placed Monday and monitor.  On Rocephin for UTI.  On , pt is confused has tele sitter, able to say appropriately remote events and less present.        Subjective & 24hr Events:     Says doing ok  Neurology evaluated pt today , pt for eventually lumbar drain and ? Shunt      Pt confused , able answer appropriately remote memory of him being a [employer] employee and his travel but unable to say why he is in the 
       Hospitalist Progress Note   Admit Date:  2024  4:30 PM   Name:  Rogerio Owens   Age:  88 y.o.  Sex:  male  :  1935   MRN:  [id number]   Room:  8/    Presenting/Chief Complaint: No chief complaint on file.     Reason(s) for Admission: NPH (normal pressure hydrocephalus) (HCC) [G91.2]     Hospital Course:   Rogerio Owens is a 88 y.o. 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 He was recently hospitalized more than one week ago for suspected GI bleed and with GI consult recommending conservative care. He had been discharged on oral iron therapy. He has been off Pradaxa since hospital discharge. Since that time, his spouse says he has been having worsening of his symptoms as detailed above. He had an MRI of his brain that showed dilated ventricles. He has followed neurology at [facility], and at that time, benefits of LP seemed like they did not outweigh the risks sine he was still on anticoagulation. Given the above findings and concern for NPH he presented to the ED. Neurosurgery was consulted.  Initial plan was to obtain lumbar drain in the ER which was unsuccessful.  CSF studies were sent and negative.  Because of no significant improvement in symptoms, lumbar drain placement was attempted yesterday at a higher level.  Unfortunately it was unsuccessful.  Neurology was consulted for allergy meds dementia testing which came back positive.  Patient was started on memantine.  Neurosurgery recommends no acute interventions as he has dementia.  Recommends inpatient rehab versus short-term rehab.     Subjective & 24hr Events:   Evaluated patient at bedside. No overnight events. Patient is hemodynamically stable resting comfortably on exam. Patient and wife confirm HPI as above. Case management has been notified and patient has 
       Hospitalist Progress Note   Admit Date:  2024  4:30 PM   Name:  Rogerio Owens   Age:  88 y.o.  Sex:  male  :  1935   MRN:  [id number]   Room:  [facility]/    Presenting/Chief Complaint: No chief complaint on file.     Reason(s) for Admission: NPH (normal pressure hydrocephalus) (HCC) [G91.2]     Hospital Course:   Rogerio Owens is a 88 y.o. 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 He was recently hospitalized more than one week ago for suspected GI bleed and with GI consult recommending conservative care. He had been discharged on oral iron therapy. He has been off Pradaxa since hospital discharge.      Since that time, his spouse says he has been having worsening of his symptoms as detailed above. He had an MRI of his brain that showed dilated ventricles. He has followed neurology at [facility], and at that time, benefits of LP seemed like they did not outweigh the risks sine he was still on anticoagulation.      ED course: Hgb of 9 (similar to previous), CXR shows possible right-sided lung nodule. Given Rocephin for concerns for possible UTI. Hospitalist consulted for admission.       Subjective & 24hr Events:   Patient reports he takes Percocet 3 times daily as needed at home.  Change frequency to every 8 hours as needed.  Still has pain all over this morning.  No fever no chills.  No chest pain or shortness of breath.  No nausea no vomiting.    Assessment & Plan:     This is a 88-year-old male with:     NPH (normal pressure hydrocephalus) (HCC)  - significant clinical improvement post-lumbar puncture  - Neurosurgery consult for shunt consideration  - PT/OT consults  - will also need outpatient follow-up  - transferred from [facility]- neurology evaluated - discussed with Neurosurgery regarding shunt   tentative 
       Hospitalist Progress Note   Admit Date:  2024  4:30 PM   Name:  Rogerio Owens   Age:  88 y.o.  Sex:  male  :  1935   MRN:  [id number]   Room:  718/    Presenting/Chief Complaint: No chief complaint on file.     Reason(s) for Admission: NPH (normal pressure hydrocephalus) (HCC) [G91.2]     Hospital Course:   Rogerio Owesn is a 88 y.o. 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 He was recently hospitalized more than one week ago for suspected GI bleed and with GI consult recommending conservative care. He had been discharged on oral iron therapy. He has been off Pradaxa since hospital discharge.      Since that time, his spouse says he has been having worsening of his symptoms as detailed above. He had an MRI of his brain that showed dilated ventricles. He has followed neurology at [facility], and at that time, benefits of LP seemed like they did not outweigh the risks sine he was still on anticoagulation.      ED course: Hgb of 9 (similar to previous), CXR shows possible right-sided lung nodule. Given Rocephin for concerns for possible UTI. Hospitalist consulted for admission.       Subjective & 24hr Events:   Patient is resting in bed.  No fever no chills.  Reports intermittent headaches.  No nausea no vomiting.  No chest pain.  He complained of cough yesterday.  Chest x-ray was done reviewed by me and shows no acute cardiopulmonary findings.    Assessment & Plan:     This is a 88-year-old male with:     NPH (normal pressure hydrocephalus) (HCC)  - significant clinical improvement post-lumbar puncture  - Neurosurgery consult for shunt consideration  - PT/OT consults  - will also need outpatient follow-up  IR and neurosurgery attempted to place lumbar drain  without much success.  CSF was able to be obtained, but the 
       Hospitalist Progress Note   Admit Date:  2024  4:30 PM   Name:  Rogerio Owens   Age:  88 y.o.  Sex:  male  :  1935   MRN:  [id number]   Room:  8/    Presenting/Chief Complaint: No chief complaint on file.     Reason(s) for Admission: NPH (normal pressure hydrocephalus) (HCC) [G91.2]     Hospital Course:   Rogerio Owens is a 88 y.o. 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 He was recently hospitalized more than one week ago for suspected GI bleed and with GI consult recommending conservative care. He had been discharged on oral iron therapy. He has been off Pradaxa since hospital discharge.      Since that time, his spouse says he has been having worsening of his symptoms as detailed above. He had an MRI of his brain that showed dilated ventricles. He has followed neurology at [facility], and at that time, benefits of LP seemed like they did not outweigh the risks sine he was still on anticoagulation.      ED course: Hgb of 9 (similar to previous), CXR shows possible right-sided lung nodule. Given Rocephin for concerns for possible UTI. Hospitalist consulted for admission.     During the course of hospitalization, neurosurgery was consulted.  Initial plan was to obtain lumbar drain in the ER which was unsuccessful.  CSF studies were sent and negative.  Because of no significant improvement in symptoms, lumbar drain placement was attempted yesterday at a higher level.  Unfortunately it was unsuccessful.  Neurology was consulted for allergy meds dementia testing which came back positive.  Patient was started on memantine.  Neurosurgery recommends no acute interventions as he has dementia.  Recommends inpatient rehab versus short-term rehab.      Subjective & 24hr Events:   Patient still has intermittent headaches.  No 
       Hospitalist Progress Note   Admit Date:  2024  4:30 PM   Name:  Rogerio Owens   Age:  88 y.o.  Sex:  male  :  1935   MRN:  [id number]   Room:  722/    Presenting/Chief Complaint: No chief complaint on file.     Reason(s) for Admission: NPH (normal pressure hydrocephalus) (HCC) [G91.2]     Hospital Course:   Rogerio Owens is a 88 y.o. 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 He was recently hospitalized more than one week ago for suspected GI bleed and with GI consult recommending conservative care. He had been discharged on oral iron therapy. He has been off Pradaxa since hospital discharge.      Since that time, his spouse says he has been having worsening of his symptoms as detailed above. He had an MRI of his brain that showed dilated ventricles. He has followed neurology at [facility], and at that time, benefits of LP seemed like they did not outweigh the risks sine he was still on anticoagulation.      ED course: Hgb of 9 (similar to previous), CXR shows possible right-sided lung nodule. Given Rocephin for concerns for possible UTI. Hospitalist consulted for admission.       Subjective & 24hr Events:   Says doing ok  Neurology evaluated pt today , pt for eventually lumbar drain and ? shunt      Assessment & Plan:   NPH (normal pressure hydrocephalus) (HCC)  - significant clinical improvement post-lumbar puncture  - will get neurosurgery consult for shunt consideration  - PT/OT consults  - will also need outpatient follow-up  - transferred from [facility]- neurology evaluated - discussed with Neurosurgery regarding shunt     Right-sided lung nodule  - per CXR prior to admission  - once more stable can get follow-up CT imaging for further evaluation, likely after discharge     # Complicated cystitis  - follow 
       Hospitalist Progress Note   Admit Date:  2024  4:30 PM   Name:  Rogerio Owens   Age:  88 y.o.  Sex:  male  :  1935   MRN:  [id number]   Room:  [facility]/    Presenting/Chief Complaint: No chief complaint on file.     Reason(s) for Admission: NPH (normal pressure hydrocephalus) (HCC) [G91.2]     Hospital Course:   Rogerio Owens is a 88 y.o. 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 He was recently hospitalized more than one week ago for suspected GI bleed and with GI consult recommending conservative care. He had been discharged on oral iron therapy. He has been off Pradaxa since hospital discharge.      Since that time, his spouse says he has been having worsening of his symptoms as detailed above. He had an MRI of his brain that showed dilated ventricles. He has followed neurology at [facility], and at that time, benefits of LP seemed like they did not outweigh the risks sine he was still on anticoagulation.      ED course: Hgb of 9 (similar to previous), CXR shows possible right-sided lung nodule. Given Rocephin for concerns for possible UTI. Hospitalist consulted for admission.       Subjective & 24hr Events:   Patient is resting in bed.  No fever no chills.  No chest pain or shortness of breath.  No nausea no vomiting.  No headache.    Assessment & Plan:   NPH (normal pressure hydrocephalus) (HCC)  - significant clinical improvement post-lumbar puncture  - Neurosurgery consult for shunt consideration  - PT/OT consults  - will also need outpatient follow-up  - transferred from [facility]- neurology evaluated - discussed with Neurosurgery regarding shunt   tentative placement of LP drain with Dr. Hogan/ STEPHANIE for      Right-sided lung nodule  - per CXR prior to admission  - once more stable can get follow-up CT 
  SPEECH LANGUAGE PATHOLOGY    Patient off of floor for procedure and not available for speech therapy services. Speech therapy will continue plan of care and treat for cognitive- communicative function.     Pepe Esparza M.S., ANISA-SLP    
  SPEECH LANGUAGE PATHOLOGY    Patient resting upon clinician's entry. Discussed status with family who reports patient does not feel well this AM and requesting to defer ST to allow patient time to rest. Speech therapy will follow up in the PM and treat, as patient is able to participate.     Addendum: Patient attempted to be seen during PM. Patient is awake when engaged but reports not feeling well and requests to defer testing on this date. Speech therapy will follow up and treat on 8/29/2024, as patient is able to participate.     Pepe Esparza M.S., CCC-SLP    
4 Eyes Skin Assessment     NAME:  Rogerio Owens  YOB: 1935  MEDICAL RECORD NUMBER:  [id number]    The patient is being assessed for  Admission    I agree that at least one RN has performed a thorough Head to Toe Skin Assessment on the patient. ALL assessment sites listed below have been assessed.      Areas assessed by both nurses:    Head, Face, Ears, Shoulders, Back, Chest, Arms, Elbows, Hands, Sacrum. Buttock, Coccyx, Ischium, and Legs. Feet and Heels        Does the Patient have a Wound? No noted wound(s)       Layo Prevention initiated by RN: Yes  Wound Care Orders initiated by RN: No    Pressure Injury (Stage 3,4, Unstageable, DTI, NWPT, and Complex wounds) if present, place Wound referral order by RN under : No    New Ostomies, if present place, Ostomy referral order under : No     Nurse 1 eSignature: Electronically signed by Indira Gooden RN on 8/21/24 at 6:06 PM EDT    **SHARE this note so that the co-signing nurse can place an eSignature**    Nurse 2 eSignature: {Esignature:[id number]}    
ACUTE OCCUPATIONAL THERAPY GOALS:   (Developed with and agreed upon by patient and/or caregiver.)  1. Patient will complete lower body bathing and dressing with CONTACT GUARD ASSIST and adaptive equipment as needed.     2. Patient will complete toileting with CONTACT GUARD ASSIST.  3. Patient will complete grooming ADL in unsupported sitting with STAND BY ASSIST.  4. Patient will tolerate 25 minutes of OT treatment with 1-2 rest breaks to increase activity tolerance for ADLs.   5. Patient will complete functional transfers with CONTACT GUARD ASSIST and adaptive equipment as needed.   6. Patient will tolerate 10 minutes BUE exercises to increase strength for safe, functional transfers.     Timeframe: 7 visits     OCCUPATIONAL THERAPY Initial Assessment and Daily Note       OT Visit Days: 1  Acknowledge Orders  Time  OT Charge Capture  Rehab Caseload Tracker      Rogerio Owens is a 88 y.o. male   PRIMARY DIAGNOSIS: NPH (normal pressure hydrocephalus) (HCC)  NPH (normal pressure hydrocephalus) (HCC) [G91.2]       Reason for Referral: Generalized Muscle Weakness (M62.81)  Difficulty in walking, Not elsewhere classified (R26.2)  Other abnormalities of gait and mobility (R26.89)  Inpatient: Payor: MEDICARE / Plan: MEDICARE PART A AND B / Product Type: *No Product type* /     ASSESSMENT:     REHAB RECOMMENDATIONS:   Recommendation to date pending progress:  Setting:  Inpatient Rehab Facility     Equipment:    To Be Determined--pt has rolling walker, SPC and wheelchair at home     ASSESSMENT:  Mr. Owens is an 87 y/o male presents with NPH, s/p LP on 8/21, still undergoing workup. Pt with PMH of lung CA, HTN, vertigo, recent hospital stay. Pt was able to walk with rolling walker/SPC a few months ago, but has had functional decline with use of wheelchair. It has become more and more difficult for wife to assist patient with transfers as he continues to get weaker as time goes on. At baseline pt lives with his 
ACUTE OCCUPATIONAL THERAPY GOALS:   (Developed with and agreed upon by patient and/or caregiver.)  1. Patient will complete lower body bathing and dressing with MINIMAL ASSIST and adaptive equipment as needed.     2. Patient will complete toileting with MINIMAL ASSIST.  3. Patient will complete grooming ADL in unsupported sitting with CONTACT GUARD ASSIST.   4. Patient will tolerate 25 minutes of OT treatment with 1-2 rest breaks to increase activity tolerance for ADLs. MET 8/30/24  5. Patient will complete functional transfers with MINIMAL ASSIST and adaptive equipment as needed.   6. Patient will tolerate 10 minutes BUE exercises to increase strength for safe, functional transfers.      Timeframe: 7 visits     OCCUPATIONAL THERAPY: Daily Note AM   OT Visit Days: 3   Time In/Out  OT Charge Capture  Rehab Caseload Tracker  OT Orders    Rogerio Owens is a 88 y.o. male   PRIMARY DIAGNOSIS: NPH (normal pressure hydrocephalus) (HCC)  NPH (normal pressure hydrocephalus) (HCC) [G91.2]       Inpatient: Payor: MEDICARE / Plan: MEDICARE PART A AND B / Product Type: *No Product type* /     ASSESSMENT:     REHAB RECOMMENDATIONS:   Recommendation to date pending progress:  Setting:  Inpatient Rehab Facility     Equipment:    To Be Determined     ASSESSMENT:  Mr. Owens demonstrates progress in cognition, balance, strength and ADL, functional mobility performance today.   He progressed to Min Assist for bed mobility and tolerated extended time in unsupported sitting. While in unsupported sitting, pt performed dynamic sitting balance activities, including UB dressing and oral hygiene with overall CGA/Min A for balance as pt still with a mild posterior lean, however note posterior lean in both sitting and standing is much improved compared to last session. He performs sit to stand with min A x 1-2 and stand pivot transfer to chair with Mod A x 2. While sitting in chair, pt dons/doffs socks with Mod Assist secondary to 
ACUTE OCCUPATIONAL THERAPY GOALS:   (Developed with and agreed upon by patient and/or caregiver.)  1. Patient will complete lower body bathing and dressing with MINIMAL ASSIST and adaptive equipment as needed.     2. Patient will complete toileting with MINIMAL ASSIST.  3. Patient will complete grooming ADL in unsupported sitting with CONTACT GUARD ASSIST.  4. Patient will tolerate 25 minutes of OT treatment with 1-2 rest breaks to increase activity tolerance for ADLs.   5. Patient will complete functional transfers with MINIMAL ASSIST and adaptive equipment as needed.   6. Patient will tolerate 10 minutes BUE exercises to increase strength for safe, functional transfers.      Timeframe: 7 visits     OCCUPATIONAL THERAPY: Daily Note PM   OT Visit Days: 1   Time In/Out  OT Charge Capture  Rehab Caseload Tracker  OT Orders    Rogerio Owens is a 88 y.o. male   PRIMARY DIAGNOSIS: NPH (normal pressure hydrocephalus) (HCC)  NPH (normal pressure hydrocephalus) (HCC) [G91.2]       Inpatient: Payor: MEDICARE / Plan: MEDICARE PART A AND B / Product Type: *No Product type* /     ASSESSMENT:     REHAB RECOMMENDATIONS:   Recommendation to date pending progress:  Setting:  Inpatient Rehab Facility     Equipment:    To Be Determined     ASSESSMENT:  Mr. Owens was re-evaluated this am prior to lumbar drain placement, see am not for full evaluation. This afternoon, pt was seen s/p attempted lumbar drain placement. Some improvements in functional mobility were noted, as he progressed to Min Assist of 1 for bed mobility (in am Min x 2), Mod Assist x 2 (in am Max A x 2) for first sit to stand. During sit to stand, pt demonstrates less posterior lean than in the morning. Pt still presents with slowed cognition and difficulty sequencing tasks and dividing attention. He performs oral hygiene at bed level with Min A and mod/max cueing for initiation and sequencing, decreased but functional fine motor skills. Pt limited by 
ACUTE OCCUPATIONAL THERAPY GOALS:   (Developed with and agreed upon by patient and/or caregiver.)  1. Patient will complete lower body bathing and dressing with MINIMAL ASSIST and adaptive equipment as needed.     2. Patient will complete toileting with MINIMAL ASSIST.  3. Patient will complete grooming ADL in unsupported sitting with CONTACT GUARD ASSIST.  4. Patient will tolerate 25 minutes of OT treatment with 1-2 rest breaks to increase activity tolerance for ADLs.   5. Patient will complete functional transfers with MINIMAL ASSIST and adaptive equipment as needed.   6. Patient will tolerate 10 minutes BUE exercises to increase strength for safe, functional transfers.      Timeframe: 7 visits     OCCUPATIONAL THERAPY: Daily Note PM   OT Visit Days: 2   Time In/Out  OT Charge Capture  Rehab Caseload Tracker  OT Orders    Rogerio Owens is a 88 y.o. male   PRIMARY DIAGNOSIS: NPH (normal pressure hydrocephalus) (HCC)  NPH (normal pressure hydrocephalus) (HCC) [G91.2]       Inpatient: Payor: MEDICARE / Plan: MEDICARE PART A AND B / Product Type: *No Product type* /     ASSESSMENT:     REHAB RECOMMENDATIONS:   Recommendation to date pending progress:  Setting:  Inpatient Rehab Facility     Equipment:    To Be Determined     ASSESSMENT:  Mr. Owens demonstrates increased impairments in sitting balance and midline awareness therefore required increased assistance with bed mobility, sitting and standing balance. He demonstrated heavy posterolateral lean to the left with no awareness of leaning. Suspect impaired proprioception, therefore attempted to formally assess proprioception, however pt unable to appropriately follow multi-step commands/ sequence the test. He requires Min Assist x 2 for bed mobility, Max Assist x 2 for sitting balance and moderate assist x 2 for lateral scooting along edge of bed. Once returned to bed, pt performed grooming task in supported sitting (HOB elevated) with SBA, noted 
ACUTE OCCUPATIONAL THERAPY GOALS:   (Developed with and agreed upon by patient and/or caregiver.)  GOALS REVIEWED AND UPDATED AT RE-EVAL 8/26/24  1. Patient will complete lower body bathing and dressing with MINIMAL ASSIST and adaptive equipment as needed.     2. Patient will complete toileting with MINIMAL ASSIST.  3. Patient will complete grooming ADL in unsupported sitting with CONTACT GUARD ASSIST.  4. Patient will tolerate 25 minutes of OT treatment with 1-2 rest breaks to increase activity tolerance for ADLs.   5. Patient will complete functional transfers with MINIMAL ASSIST and adaptive equipment as needed.   6. Patient will tolerate 10 minutes BUE exercises to increase strength for safe, functional transfers.     Timeframe: 7 visits     OCCUPATIONAL THERAPY Daily Note, Re-evaluation, and AM       OT Visit Days: 1  Acknowledge Orders  Time  OT Charge Capture  Rehab Caseload Tracker      Rogerio Owens is a 88 y.o. male   PRIMARY DIAGNOSIS: NPH (normal pressure hydrocephalus) (HCC)  NPH (normal pressure hydrocephalus) (HCC) [G91.2]       Reason for Referral: Generalized Muscle Weakness (M62.81)  Difficulty in walking, Not elsewhere classified (R26.2)  Other abnormalities of gait and mobility (R26.89)  Inpatient: Payor: MEDICARE / Plan: MEDICARE PART A AND B / Product Type: *No Product type* /     ASSESSMENT:     REHAB RECOMMENDATIONS:   Recommendation to date pending progress:  Setting:  Inpatient Rehab Facility     Equipment:    To Be Determined--pt has rolling walker, SPC and wheelchair at home     ASSESSMENT:  Mr. Owens is an 89 y/o male presents with NPH, s/p LP on 8/21, still undergoing workup. Pt with PMH of lung CA, HTN, vertigo, recent hospital stay. Pt was able to walk with rolling walker/SPC a few months ago, but has had functional decline with use of wheelchair. It has become more and more difficult for wife to assist patient with transfers as he continues to get weaker as time goes on. 
ACUTE PHYSICAL THERAPY GOALS:   (Developed with and agreed upon by patient and/or caregiver.)  ST. Patient will perform bed mobility with MODERATE ASSISTANCE within 3 days.  2. Patient will transfer sit to stand with MODERATE ASSISTANCE within 3 days.   3. Patient will transfer from bed to chair with MODERATE ASSISTANCE within 3 days.  4. Patient will demonstrate FAIR DYNAMIC SITTING balance within 3 day(s).  5 Patient will tolerate 15+ minutes of therapeutic activity/exercise and/or neuromuscular re-education while maintaining stable vitals to improve functional strength and activity tolerance within 3 days.     LT. Patient will perform bed mobility with CONTACT GUARD ASSISTANCE within 7 days.  2. Patient will transfer bed to chair with MINIMAL ASSISTANCE within 7 days.  3. Patient will demonstrate FAIR+ DYNAMIC SITTING balance within 7 day(s).  4. Patient will demonstrate fair dynamic balance throughout stand pivot transfer to wheelchair within 7 days.  5. Patient will tolerate 25+ minutes of therapeutic activity/exercise and/or neuromuscular re-education while maintaining stable vitals to improve functional strength and activity tolerance within 7 days       PHYSICAL THERAPY: Daily Note PM   (Link to Caseload Tracking: PT Visit Days : 1  Time In/Out PT Charge Capture  Rehab Caseload Tracker  Orders    Rogerio Owens is a 88 y.o. male   PRIMARY DIAGNOSIS: NPH (normal pressure hydrocephalus) (HCC)  NPH (normal pressure hydrocephalus) (HCC) [G91.2]       Inpatient: Payor: MEDICARE / Plan: MEDICARE PART A AND B / Product Type: *No Product type* /     ASSESSMENT:     REHAB RECOMMENDATIONS:   Recommendation to date pending progress:  Setting:  Inpatient Rehab Facility    Equipment:    To Be Determined     ASSESSMENT:    PLEASE SEE FULL EVALUATION ON AM NOTE.     POST ATTEMPTED LUMBAR DRAIN PLACEMENT (PM): Post procedure, incremental improvement in functional mobility appreciated. Patient required less 
ACUTE PHYSICAL THERAPY GOALS:   (Developed with and agreed upon by patient and/or caregiver.)  ST. Patient will perform bed mobility with MODERATE ASSISTANCE within 3 days.  2. Patient will transfer sit to stand with MODERATE ASSISTANCE within 3 days.   3. Patient will transfer from bed to chair with MODERATE ASSISTANCE within 3 days.  4. Patient will demonstrate FAIR DYNAMIC SITTING balance within 3 day(s).  5 Patient will tolerate 15+ minutes of therapeutic activity/exercise and/or neuromuscular re-education while maintaining stable vitals to improve functional strength and activity tolerance within 3 days.     LT. Patient will perform bed mobility with CONTACT GUARD ASSISTANCE within 7 days.  2. Patient will transfer bed to chair with MINIMAL ASSISTANCE within 7 days.  3. Patient will demonstrate FAIR+ DYNAMIC SITTING balance within 7 day(s).  4. Patient will demonstrate fair dynamic balance throughout stand pivot transfer to wheelchair within 7 days.  5. Patient will tolerate 25+ minutes of therapeutic activity/exercise and/or neuromuscular re-education while maintaining stable vitals to improve functional strength and activity tolerance within 7 days       PHYSICAL THERAPY: Daily Note PM   (Link to Caseload Tracking: PT Visit Days : 2  Time In/Out PT Charge Capture  Rehab Caseload Tracker  Orders    Rogerio Owens is a 88 y.o. male   PRIMARY DIAGNOSIS: NPH (normal pressure hydrocephalus) (HCC)  NPH (normal pressure hydrocephalus) (HCC) [G91.2]       Inpatient: Payor: MEDICARE / Plan: MEDICARE PART A AND B / Product Type: *No Product type* /     ASSESSMENT:     REHAB RECOMMENDATIONS:   Recommendation to date pending progress:  Setting:  Inpatient Rehab Facility    Equipment:    To Be Determined     ASSESSMENT:  Mr. Owens is a pleasant 88 year old male admitted with progressive functional decline and workup ongoing for NPH. At baseline, Mr. Owens has been non-ambulatory x4-5 months and 
ACUTE PHYSICAL THERAPY GOALS:   (Developed with and agreed upon by patient and/or caregiver.)  ST. Patient will perform bed mobility with MODERATE ASSISTANCE within 3 days.  2. Patient will transfer sit to stand with MODERATE ASSISTANCE within 3 days.   3. Patient will transfer from bed to chair with MODERATE ASSISTANCE within 3 days.  4. Patient will demonstrate FAIR DYNAMIC SITTING balance within 3 day(s).  5 Patient will tolerate 15+ minutes of therapeutic activity/exercise and/or neuromuscular re-education while maintaining stable vitals to improve functional strength and activity tolerance within 3 days.     LT. Patient will perform bed mobility with CONTACT GUARD ASSISTANCE within 7 days.  2. Patient will transfer bed to chair with MINIMAL ASSISTANCE within 7 days.  3. Patient will demonstrate FAIR+ DYNAMIC SITTING balance within 7 day(s).  4. Patient will demonstrate fair dynamic balance throughout stand pivot transfer to wheelchair within 7 days.  5. Patient will tolerate 25+ minutes of therapeutic activity/exercise and/or neuromuscular re-education while maintaining stable vitals to improve functional strength and activity tolerance within 7 days    PHYSICAL THERAPY Daily Note, Re-evaluation, and AM  (Link to Caseload Tracking: PT Visit Days : 1  Acknowledge Orders  Time In/Out  PT Charge Capture  Rehab Caseload Tracker    Rogerio Owens is a 88 y.o. male   PRIMARY DIAGNOSIS: NPH (normal pressure hydrocephalus) (HCC)  NPH (normal pressure hydrocephalus) (HCC) [G91.2]       Reason for Referral: Difficulty in walking, Not elsewhere classified (R26.2)  Ataxic gait (R26.0)  History of falling (Z91.81)  Dizziness and Giddiness (R42)  Unspecified Lack of Coordination (R27.9)  Inpatient: Payor: MEDICARE / Plan: MEDICARE PART A AND B / Product Type: *No Product type* /     ASSESSMENT:     REHAB RECOMMENDATIONS:   Recommendation to date pending progress:  Setting:  Inpatient Rehab 
ACUTE PHYSICAL THERAPY GOALS:   (Developed with and agreed upon by patient and/or caregiver.)  ST. Patient will perform bed mobility with MODERATE ASSISTANCE within 3 days.  2. Patient will transfer sit to stand with MODERATE ASSISTANCE within 3 days.   3. Patient will transfer from bed to chair with MODERATE ASSISTANCE within 3 days.  4. Patient will demonstrate FAIR DYNAMIC SITTING balance within 3 day(s).  5 Patient will tolerate 15+ minutes of therapeutic activity/exercise and/or neuromuscular re-education while maintaining stable vitals to improve functional strength and activity tolerance within 3 days.     LT. Patient will perform bed mobility with CONTACT GUARD ASSISTANCE within 7 days.  2. Patient will transfer bed to chair with MINIMAL ASSISTANCE within 7 days.  3. Patient will demonstrate FAIR+ DYNAMIC SITTING balance within 7 day(s).  4. Patient will demonstrate fair dynamic balance throughout stand pivot transfer to wheelchair within 7 days.  5. Patient will tolerate 25+ minutes of therapeutic activity/exercise and/or neuromuscular re-education while maintaining stable vitals to improve functional strength and activity tolerance within 7 days.    PHYSICAL THERAPY: Daily Note AM   (Link to Caseload Tracking: PT Visit Days : 2  Time In/Out PT Charge Capture  Rehab Caseload Tracker  Orders    Rogerio Owens is a 88 y.o. male   PRIMARY DIAGNOSIS: NPH (normal pressure hydrocephalus) (HCC)  NPH (normal pressure hydrocephalus) (HCC) [G91.2]       Inpatient: Payor: MEDICARE / Plan: MEDICARE PART A AND B / Product Type: *No Product type* /     ASSESSMENT:     REHAB RECOMMENDATIONS:   Recommendation to date pending progress:  Setting:  Inpatient Rehab Facility    Equipment:    To Be Determined     ASSESSMENT:  Mr. Owens was supine upon contact and agreeable to PT. Patient had LP yesterday (refer to note for results as well as detailed history). Patient performed supine to sit with min-mod 
Alert, oriented x 3.  Medicated for right shoulder pain x 1.  Incontinent.  Wife stayed at bedside.  Patient slept fair.  Call light in reach.   
Attempted to call report. Awaiting a return call.  
Comprehensive Nutrition Assessment    Type and Reason for Visit: Initial, RD Nutrition Re-Screen/LOS  Length of Stay    Nutrition Recommendations/Plan:   Meals and Snacks:  Diet: Initiate Regular per discussion with provider Dr. Harkins  Nutrition Supplement Therapy:  Medical food supplement therapy:  Initiate Ensure Enlive twice per day (this provides 350 kcal and 20 grams protein per bottle)     Malnutrition Assessment:  Malnutrition Status: No malnutrition     Nutrition Assessment:  Nutrition History: Pt reports intake was at baseline pta. He denies changes in intake pta. Wife reports she was giving pt fairfeild protein shakes however she found out they had plastic in them so she stopped.     Do You Have Any Cultural, Yazdanism, or Ethnic Food Preferences?: No   Weight History: PT reports wt fluctuations within 5lbs. Per EMR wt hx review 8/25 185lb (neuro), 2/22 185lb (IM), 5/23 180lb (neuro).   Nutrition Background:       PMH significant for FLETCHER luncg cancer s/p resection, LULU, GERD, and HTN. Pt admitted with normal pressure hydrocephalus.   Nutrition Interval:  Pt seen laying in bed with wife present. Pt reports decreased intake here. Pt reports dislike of some meals. Wife stated food is arriving cold. Pt stated he will eat well if he likes the meal served. He is agreeable to trial Ensure to have when he doesn't like the meal. Pt was NPO for procedure. Procedure now complete. Discussed resuming diet with Dr. Harkins. Discussed with Joe QUISPE.      Current Nutrition Therapies:  ADULT DIET; Regular  ADULT ORAL NUTRITION SUPPLEMENT; Breakfast, Dinner; Standard High Calorie/High Protein Oral Supplement    Current Intake:   Average Meal Intake:  (intake variable 0-100%)        Anthropometric Measures:  Height: 167.6 cm (5' 5.98\")  Current Body Wt: 81.6 kg (179 lb 14.3 oz) (8/22), Weight source: Not Specified  BMI: 29, Overweight (BMI 25.0-29.9)  Admission Body Weight: 81.6 kg (179 lb 14.3 oz) (8/22- no wt method)  Ideal 
GOALS:  LTG: Patient will improve cognitive- communicative function to perform daily activities at highest possible level.   STG: Patient will display orientation to time and situation with 90% accuracy provided with min A.   STG: Patient will recall interactions with staff/care givers within a 24 hour interval with 75% accuracy given min A.     SPEECH LANGUAGE PATHOLOGY: COGNITIVE COMMUNICATION   Initial Assessment    Acknowledge Order  I  Therapy Time  I   Charges     I  Rehab Caseload Tracker    NAME: Rogerio Owens  : 1935  MRN: [id number]    ADMISSION DATE: 2024  PRIMARY DIAGNOSIS: NPH (normal pressure hydrocephalus) (HCC)    ICD-10: Treatment Diagnosis:  R41.841 Cognitive-Communication Deficit    RECOMMENDATIONS:   Recommendations: Patient does exhibit deficits with orientation, memory, and insight. Testing performed on this date, as follows:     [facility] Mental Status Exam:    COGNISTAT Orientation Subtest: 11  COGNISTAT Attention Subtest: 7/8    See Objective section for additional information on testing.     Speech therapy will follow during hospitalization and perform ongoing assessment of cognitive function.    Therapeutic Interventions: Patient/family education  Cognitive-linguistic treatment   Patient continues to require skilled intervention: Yes. Recommend ongoing speech therapy services during this hospitalization.   Anticipated Discharge Needs: Ongoing speech therapy is recommended at next level of care.      ASSESSMENT   Patient presents with deficits with orientation, memory, and insight. Subjectively, patient's deficits rate as mild and family provides assistance as required. Testing performed on this date as patient planned for LP draining with neurosurgery later today.     GENERAL   Subjective:  Patient awake, alert, and agreeable to speech therapy assessment.     Recent Information/Background: Rogerio Owens is a 88 y.o. male with medical history of 
GOALS:  LTG: Patient will improve cognitive- communicative function to perform daily activities at highest possible level.   STG: Patient will display orientation to time and situation with 90% accuracy provided with min A. Ongoing 2024  STG: Patient will recall interactions with staff/care givers within a 24 hour interval with 75% accuracy given min A. Ongoing 2024    SPEECH LANGUAGE PATHOLOGY: COGNITIVE COMMUNICATION   Daily Note #1    Acknowledge Order  I  Therapy Time  I   Charges     I  Rehab Caseload Tracker    NAME: Rogerio Owens  : 1935  MRN: [id number]    ADMISSION DATE: 2024  PRIMARY DIAGNOSIS: NPH (normal pressure hydrocephalus) (HCC)    ICD-10: Treatment Diagnosis:  R41.841 Cognitive-Communication Deficit    RECOMMENDATIONS:   Recommendations: Patient does exhibit deficits with orientation, memory, and insight. Testing as follows:     [facility] Mental Status Exam (UMS): 15/30   COGNISTAT Orientation Subtest:   COGNISTAT Attention Subtest:     See Assessment section for table tracking patient's performance.     Speech therapy will follow during hospitalization and perform ongoing assessment of cognitive function.    Therapeutic Interventions: Patient/family education  Cognitive-linguistic treatment   Patient continues to require skilled intervention: Yes. Recommend ongoing speech therapy services during this hospitalization.   Anticipated Discharge Needs: Ongoing speech therapy is recommended at next level of care.      ASSESSMENT   Cognitive- Communicative - Table below is for tracking performance on instrumental assessment:     2024 Pre-Test  2024 Post-Test   SLUM 17/30 15/30   COGNISTAT Orientation    COGNISTAT Attention      Differences from previous administration:     SLUMS: Compared to previous administration patient with errors when stating current day of the week, with divergent naming task, and problem solving task. On 
I participated with a interventional radiologist and attempted to place his lumbar drain.  Multiple attempts were made by the interventional radiologist to obtain access.  We were able to obtain CSF but the catheter would not pass.  On the table myelogram proved we were in the intrathecal space but the stenosis was severe enough that we could not get the catheter to pass.  By this point he was getting very restless and we felt it was best not to attempt placement higher than the stenosis.    It is my hope that with several punctures the thecal sac with the 18-gauge Touhy needle he will have enough of an internal leak that we will be able to determine if CSF drainage has the ability to improve his gait and his mental status.    I discussed this plan with physical therapy and I will reevaluate him this afternoon and in the morning and get back to me.    If by tomorrow afternoon he is showing no improvement then our plan will be to attempt to drain at a higher level sometime on Wednesday or Thursday.    I have also discussed the change in plans with neurology who also wants to make sure that a workup for Alzheimer's has been undertaken prior to making decision about shunting.  
NEUROSURGERY PROGRESS NOTE:   Admit Date: 8/21/2024  Subjective:   No acute overnight events. He is doing well this am without any changes. Family at the bedside this am.     Objective:  /72   Pulse 73   Temp 97.3 °F (36.3 °C) (Axillary)   Resp 17   Ht 1.676 m (5' 5.98\")   Wt 81.6 kg (179 lb 14.3 oz)   SpO2 93%   BMI 29.05 kg/m²   General: No acute distress  Mood pleasant  Awake, alert, and oriented to person, hospital, month and year   Eyes open spontaneously   PERRL  Patient follows commands with symmetric and greater than antigravity strength against resistance with some age expected giveaway weakness there are visible contractures of the left hand that appear to be chronic    Assessment and Plan:   Rogerio Owens 88 y.o. male who presented with worsening confusion, incontinence and with difficulty with gait. He had a GI bleed a week ago and has been on oral iron therapy and off of Pradaxa since discharge from the GI bleed on 8/12/2024. His CT Head WO Contrast from 8/20/2024 demonstrated ventriculomegaly with associated bitemporal atrophy and enlargement of the Slyvian and Perislyvian fissures. There is some hypodensity in the periventricular white matter which could be consistent with transependymal flow.  He underwent a pre-LP and post LP physical therapy assessments with noted improvement with transferring positions but still with apraxic gait post LP.  He has been evaluated by neurologist, Dr. Jayden Santos.  Dr. EVA Muñiz is also been involved in the patient's care and there is tentative plan for a placement of a lumbar drain with interventional radiology and Dr. EVA Muñiz tomorrow.  Recommend continuing to hold antiplatelet and anticoagulant medication.  CBC yesterday demonstrated a hemoglobin of 10.6 g/dL up from low of 7.7 g/dL on 8/11/2024., a platelet count of 295,000 and a white blood cell count of 7.6. Consult to IR placed in Epic by Bam JADE, 
Neurology Consult Note       History:   88-year-old male with cervical stenosis, LULU admitted for UTI in the setting of chronic neurologic decline.  History from the spouse.  He was walking independently around 6 months ago when he started to need more and his wife reports his gait was shuffling support, she was concerned that he had Parkinson's. Since the past 4 months has been dependent on a wheelchair.  Additionally he has \"no control of his bladder.\"  He gets confused at night at times but otherwise no major cognitive issues.  Previously was on Pradaxa for PE but taken off recently after a bleeding event.    Interval history: Continues to have intermittent confusion, worse at night.      Exam: Pertinent positives and negatives include:  Fully awake alert and oriented. No language deficits or dysarthria.  No involuntary abnormal saccades or nystagmus.  No facial asymmetry.  He moves extremities spontaneously and with purpose; he is diffusely rigid throughout.  No abnormal involuntary movements and muscle tone is normal throughout. Sensation is intact to light touch.     Imaging and review of data:   CT head with enlarged ventricles, narrowed callosal angle, focally enlarged sulci    I reviewed the patient's MRI from 5/30 which showed severe cerebral volume loss with ex vacuo dilation versus true enlargement of the ventricles (normal pressure hydrocephalus)      Assessment and Plan:   88-year-old male with possible normal pressure hydrocephalus.  It is generally advised to exclude Alzheimer's disease in patients who are assumed to have normal pressure hydrocephalus, especially at the age of 88.  I have sent serum testing and CSF testing for Alzheimer's disease.  If this is positive, then I would recommend against shunting.              Cumulative time spent today was 35 minutes which included chart review, obtaining history (from patient, family, or other providers), review of images, examining the patient, and 
Occupational Therapy Note:    Patient declined participation in OT session today despite encouragement and education from therapist and patient's spouse. Patient stated he didn't feel well today. Attempted to set goal with patient of treatment session tomorrow- transferring into recliner chair. Patient appeared more confused throughout conversation today. Will continue to follow plan of care and re-attempt tomorrow.     Geni Solo, OT, OTD  
PT Daily Note:  HOLD PT due to confusion and agitation. Will check back on patient at a later date/time if schedule permits.  Thank you,  Ling Casarez, PTA    
Patient returned to the unit after attempted LP with a headache rated 7/10 pain. RN notified neurosurgeon [place] about the pain was notified through [employer] to treat the patient symptomatically. No new orders placed.  
Physical Therapy Note:    Attempted to see patient today for treatment. Patient off of the floor for drain placement. Planned to re-evaluate Mr. Owens post procedure. Discussed with primary RN.    14:50PM Discussed patient case with Dr. Muñiz s/p attempted drain placement. Continue to recommend inpatient rehab: see previous notes. Will continue with PT plan of care tomorrow. Thank you,    Latosha Sepulveda, PT, DPT  8/29/24  
Physical Therapy Note:    Patient declined participation in PT session today despite encouragement and education from therapist and patient's spouse. Patient stated he didn't feel well today. Attempted to set goal with patient of treatment session tomorrow- transferring into recliner chair. Patient appeared more confused throughout conversation today. Will continue to follow plan of care and re-attempt tomorrow.     Latosha Sepulveda, PT, DPT  
Physical Therapy Note:    Patient transferred from [facility] to [facility] for routine progression of care and neurosurgery consult. See PT evaluation from yesterday 8/21/24 in IR. Goals and plan of care are appropriate (now at 3x/week). Continue with stated plan of care. No re-evaluation charge to patient. Thank you,    Latosha Sepulveda, PT, DPT  8/22/24  
Pt refused all morning medications. Pt continuously yells for help and calls out for the police. Combative with staff when any attempt to readjust pt for safety. Pt has phone at bedside and attempts to call 911 several times. Telesitter has alarmed once so far this shift. Will continue to monitor pt for safety and will continue to try and reorient and redirect.   
TRANSFER - IN REPORT:    Verbal report received from BRITTANEY Segundo on Rogerio Owens  being received from [facility] for routine progression of patient care      Report consisted of patient's Situation, Background, Assessment and   Recommendations(SBAR).     Information from the following report(s) Nurse Handoff Report was reviewed with the receiving nurse.    Opportunity for questions and clarification was provided.      Assessment completed upon patient's arrival to unit and care assumed.     
TRANSFER - OUT REPORT:           Verbal report given to Kim on Rogerio Owens  being transferred to IR Recovery for routine progression of patient care      Report consisted of patient’s Situation, Background, Assessment and Recommendations(SBAR).          Information from the following report(s) SBAR was reviewed with the receiving nurse.       Opportunity for questions and clarification was provided.       Pt tolerated procedure well.                   
TRANSFER - OUT REPORT:           Verbal report given to Rubi on Rogerio Owens  being transferred to 7th Floor for routine progression of patient care      Report consisted of patient’s Situation, Background, Assessment and Recommendations(SBAR).          Information from the following report(s) SBAR was reviewed with the receiving nurse.       Opportunity for questions and clarification was provided.               
TRANSFER - OUT REPORT:    Verbal report given to BRITTANEY Diaz on Rogerio Owens  being transferred to 7th floor for routine post-op       Report consisted of patient's Situation, Background, Assessment and   Recommendations(SBAR).     Information from the following report(s) Surgery Report and MAR was reviewed with the receiving nurse.           Lines:   Peripheral IV 08/20/24 Left Wrist (Active)   Site Assessment Clean, dry & intact 08/29/24 0900   Line Status Flushed;Capped 08/29/24 0900   Line Care Connections checked and tightened 08/29/24 0900   Phlebitis Assessment No symptoms 08/29/24 0900   Infiltration Assessment 0 08/29/24 0900   Alcohol Cap Used Yes 08/29/24 0900   Dressing Status Clean, dry & intact 08/29/24 0900   Dressing Type Transparent 08/29/24 0900        Opportunity for questions and clarification was provided.    
Therapy Note:    Discussed patient case with Dr. Muñiz. PT, OT, and SLP to see patient prior to lumbar drain placement this AM and again this PM. Will then follow with daily plan of care to continue to assess patient functional mobility and cognitive status. Orders placed. Thank you,    Latosha Sepulveda, PT, DPT  8/26/24  
28 20 - 28 mmol/L    Anion Gap 10 9 - 18 mmol/L    Glucose 102 (H) 70 - 99 mg/dL    BUN 18 8 - 23 MG/DL    Creatinine 0.86 0.80 - 1.30 MG/DL    Est, Glom Filt Rate 84 >60 ml/min/1.73m2    Calcium 9.1 8.8 - 10.2 MG/DL        Vandana Machuca, APRN - CNP    
few month decline in functional mobility, just prior to this admission, Mr. Owens was transferring with 1 person assistance. Today, patient demonstrated improved activity tolerance and functional mobility. Performed bed mobility with CGA to left and min A to right, sit to stand with minimal assistance x2, and transfer from bed to chair with moderate assistance x2. Also participated in dynamic sitting balance activity, activity pacing, and functional mobility with facilitation. Demonstrated improved sitting balance, fair+ statically, and fair to occasional fair- dynamically. Progress made toward goals today (met 5/10 goals set), continue with 5x/week plan of care. Strongly recommend inpatient rehabilitation at discharge to continue to address stated deficits.      SUBJECTIVE:   Mr. Owens states, \"Thank you.\"     Social/Functional Lives With: Spouse  Type of Home: House  Home Layout: One level  Home Access: Ramped entrance  Bathroom Shower/Tub: Walk-in shower  Bathroom Toilet: Handicap height  Bathroom Equipment: Grab bars in shower, Grab bars around toilet  Bathroom Accessibility: Wheelchair accessible  Home Equipment: Wheelchair - Electric  Receives Help From: Family  ADL Assistance: Needs assistance  Ambulation Assistance: Needs assistance  Occupation: Retired  OBJECTIVE:     PAIN: VITALS / O2: PRECAUTION / LINES / DRAINS:   Pre Treatment:   Pain Assessment: 0-10  Pain Level: 6  Pain Location: Head  Pain Descriptors: Aching  Non-Pharmaceutical Pain Intervention(s): Ambulation/Increased Activity;Emotional support;Repositioned;Rest      Post Treatment: Reports comfort in bedside chair, did not quantify Vitals        Oxygen    External Catheter and IV    RESTRICTIONS/PRECAUTIONS:  Restrictions/Precautions  Restrictions/Precautions: Fall Risk  Restrictions/Precautions: Fall Risk       MOBILITY: I Mod I S SBA CGA Min Mod Max Total  NT x2 Comments:   Bed Mobility    Rolling [] [] [] [] [x] [] [] [] [] [] []  
task increasing score by +1. Recalled 2/5 objects increasing score by +1. In story recall task, accurately answered 3/4 questions with a score of 6/8 (-2 compared to previous date). Total score of 15/30 for today.     COGNISTAT Orientation: Patient unable to state current date or year resulting in a score of 9/12.     COGNISTAT Attention: No errors during task with patient exhibiting score of 8/8.     Impression: Performance today similar to testing on previous date. Patient unable to accurately state current year today.     Additional Notes: Patient continues to report headache and limited resting during hospitalization. Otherwise, without complaints and agreeable to continue ongoing testing of cognitive function. Will continue ST plan of care.     GENERAL   Subjective:  Patient resting upon entry though able to achieve wakeful state when engaged.     Recent Information/Background: Rogerio Owens is a 88 y.o. 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 He was recently hospitalized more than one week ago for suspected GI bleed and with GI consult recommending conservative care. He had been discharged on oral iron therapy. He has been off Pradaxa since hospital discharge.      Since that time, his spouse says he has been having worsening of his symptoms as detailed above. He had an MRI of his brain that showed dilated ventricles. He has followed neurology at [facility], and at that time, benefits of LP seemed like they did not outweigh the risks sine he was still on anticoagulation.    History of Present Injury/Illness: Mr. Owens  has a past medical history of Baker's cyst, Hypercholesterolemia, Hypertension, and Vertigo.. He also  has a past surgical history that includes gi (1964); neurological surgery (1988); and 
Device: None (Room air)    Estimated body mass index is 29.05 kg/m² as calculated from the following:    Height as of this encounter: 1.676 m (5' 5.98\").    Weight as of this encounter: 81.6 kg (179 lb 14.3 oz).    Intake/Output Summary (Last 24 hours) at 8/28/2024 1353  Last data filed at 8/28/2024 1351  Gross per 24 hour   Intake 942 ml   Output 1550 ml   Net -608 ml         Physical Exam:     General:    Well nourished.  Alert awake elderly male, chronically ill-appearing,  Head:  Normocephalic, atraumatic  Eyes:  Sclerae appear normal.  Pupils equally round.  ENT:  Nares appear normal.  Moist oral mucosa  Neck:  No restricted ROM.  Trachea midline   CV:   RRR.  No m/r/g.  No jugular venous distension.  Lungs:   CTAB.  No wheezing, rhonchi, or rales.  Symmetric expansion.  Abdomen:   Soft, nontender, nondistended.  Extremities: No cyanosis or clubbing.  No edema  Skin:     No rashes.  Normal coloration.   Warm and dry.    Neuro:  CN II-XII grossly intact.  Able to move extremities.  Psych:  Normal mood and affect.      I have personally reviewed labs and tests:  Recent Labs:  Recent Results (from the past 48 hour(s))   MISCELLANEOUS SENDOUT [facility] P217 Test ID    Collection Time: 08/26/24  3:31 PM   Result Value Ref Range    Test Description: [facility] P217 TEST ID     Reference Lab [place]     Results: PENDING    CBC with Auto Differential    Collection Time: 08/27/24  4:08 AM   Result Value Ref Range    WBC 7.1 4.3 - 11.1 K/uL    RBC 3.45 (L) 4.23 - 5.6 M/uL    Hemoglobin 9.9 (L) 13.6 - 17.2 g/dL    Hematocrit 32.4 (L) 41.1 - 50.3 %    MCV 93.9 82 - 102 FL    MCH 28.7 26.1 - 32.9 PG    MCHC 30.6 (L) 31.4 - 35.0 g/dL    RDW 18.2 (H) 11.9 - 14.6 %    Platelets 278 150 - 450 K/uL    MPV 9.4 9.4 - 12.3 FL    nRBC 0.00 0.0 - 0.2 K/uL    Differential Type AUTOMATED      Neutrophils % 65 43 - 78 %    Lymphocytes % 19 13 - 44 %    Monocytes % 8 4.0 - 12.0 %    Eosinophils % 7 0.5 - 7.8 %    Basophils % 1 0.0 - 2.0 
(EFFER-K) effervescent tablet 40 mEq  40 mEq Oral PRN    Or    potassium chloride 10 mEq/100 mL IVPB (Peripheral Line)  10 mEq IntraVENous PRN    cefTRIAXone (ROCEPHIN) 1,000 mg in sterile water 10 mL IV syringe  1,000 mg IntraVENous Q24H    [Held by provider] enoxaparin (LOVENOX) injection 40 mg  40 mg SubCUTAneous Q24H    escitalopram (LEXAPRO) tablet 20 mg  20 mg Oral Daily    furosemide (LASIX) tablet 20 mg  20 mg Oral Daily    pantoprazole (PROTONIX) tablet 40 mg  40 mg Oral Daily    sodium chloride flush 0.9 % injection 5-40 mL  5-40 mL IntraVENous 2 times per day    buPROPion (WELLBUTRIN) tablet 75 mg  75 mg Oral Daily    guaiFENesin (ROBITUSSIN) 100 MG/5ML liquid 200 mg  200 mg Oral Q6H PRN       Signed:  Joel Harkins MD

## 2024-09-01 NOTE — DISCHARGE SUMMARY
Hospitalist Discharge Summary   Admit Date:  2024  4:30 PM   DC Note date: 2024  Name:  Rogerio Owens   Age:  88 y.o.  Sex:  male  :  1935   MRN:  [id number]   Room:  [facility]  PCP:  Payam Gonzalez DO    Presenting Complaint: No chief complaint on file.     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 He was recently hospitalized more than one week ago for suspected GI bleed and with GI consult recommending conservative care. He had been discharged on oral iron therapy. He has been off Pradaxa since hospital discharge. Since that time, his spouse says he has been having worsening of his symptoms as detailed above. He had an MRI of his brain that showed dilated ventricles. He has followed neurology at [facility], and at that time, benefits of LP seemed like they did not outweigh the risks sine he was still on anticoagulation. Given the above findings and concern for NPH he presented to the ED. Neurosurgery was consulted.  Initial plan was to obtain lumbar drain in the ER which was unsuccessful.  CSF studies were sent and negative.  Because of no significant improvement in symptoms, lumbar drain placement was attempted yesterday at a higher level.  Unfortunately it was unsuccessful.  Neurology was consulted for allergy meds dementia testing which came back positive.  Patient was started on memantine.  Neurosurgery recommends no acute interventions as he has dementia.  Recommends rehab and has been accepted for admission to [facility] rehab hospital today with transportation at 1 PM.    I discussed the plan as above with patient and wife

## 2024-09-01 NOTE — CARE COORDINATION
Chart review complete.  Neurosurgery and IR attempted to place lumbar drain today.  NS plans several punctures of the thecal sac in the hopes the CSF drainage will improve his gait and mental status.  Therapy evals complete with the recommendation for inpatient rehab at [facility].  [facility] is following patient for possible admission. CM will continue to follow to assist as needed.   
Chart review complete. Plan is to take pt to IR today to try and replace lumbar drain. [facility] still following in the hopes the interventions will improve pt's functioning and ability to participate in acute level of rehab.  CM following to assist as needed.  
Patient accepted for admission to [facility] when a bed is available and pt is medically cleared for dc.  No beds available today but their liaison will reach out to CM tomorrow about availability.  Pt/spouse notified by [facility] liaison.  CM following.  
Patient's inpatient plan of care and transitions of care planning reviewed in IDT rounds.  Patient will remain inpatient for hopeful placement of LP drain placement Monday with Dr. Muñiz, subsequent PT/OT evaluations for a \"more definitive pre-surgical assessment\" for possible placement of  shunt.  WARREN updated to 8/30/2024.  [facility] is following for possible admission when patient is medically ready.  CM will remain available.  
  Social/Functional History   Lives With Spouse   Type of Home House   Home Layout One level   Home Access Ramped entrance   Bathroom Shower/Tub Walk-in shower   Bathroom Toilet Handicap height   Bathroom Equipment Grab bars in shower;Grab bars around toilet   Bathroom Accessibility Wheelchair accessible   Home Equipment Wheelchair - Electric   Receives Help From Family   ADL Assistance Needs assistance   Ambulation Assistance Needs assistance   Occupation Retired   Discharge Planning   Type of Residence House   Living Arrangements Spouse/Significant Other   Current Services Prior To Admission Home Care   Current DME Prior to Arrival Wheelchair   Potential Assistance Needed Home Care;Skilled Nursing Facility   DME Ordered? No   Potential Assistance Purchasing Medications No   Type of Home Care Services Nursing Services;PT;OT   Patient expects to be discharged to: Unknown   Services At/After Discharge   Transition of Care Consult (CM Consult) Discharge Planning   Internal Home Health No   Reason Outside Agency Chosen Patient already serviced by other home care/hospice agency  ([facility])   [place] Resource Information Provided? No   Mode of Transport at Discharge Other (see comment)  (Spouse)   Confirm Follow Up Transport Family       
 No   Patient's  Info spouse [place]   Mode of Transportation Car   Occupation Retired   Discharge Planning   Type of Residence Acute Rehab   Living Arrangements Spouse/Significant Other   Current Services Prior To Admission Home Care   Current DME Prior to Arrival Wheelchair   Potential Assistance Needed Other (Comment)  (acute inpatient rehab)   DME Ordered? No   Potential Assistance Purchasing Medications No   Type of Home Care Services None   Patient expects to be discharged to: Rehabilitation facility   One/Two Story Residence One story   History of falls? 1   Services At/After Discharge   Transition of Care Consult (CM Consult) Acute Rehab;Discharge Planning   Services At/After Discharge Inpatient rehab;OT;PT;Nursing services;Transport;In ambulance;SLP    Resource Information Provided? No   Mode of Transport at Discharge BLS  ([employer])   Hospital Transport Time of Discharge 1300   Confirm Follow Up Transport Family   Condition of Participation: Discharge Planning   The Plan for Transition of Care is related to the following treatment goals: Acute inpatient rehab to improve pt's strength, mobility and functional abilities for a safe transition to home   The Patient and/or Patient Representative was provided with a Choice of Provider? Patient   The Patient and/Or Patient Representative agree with the Discharge Plan? Yes   Freedom of Choice list was provided with basic dialogue that supports the patient's individualized plan of care/goals, treatment preferences, and shares the quality data associated with the providers?  Yes

## 2024-09-01 NOTE — PLAN OF CARE
Problem: Discharge Planning  Goal: Discharge to home or other facility with appropriate resources  8/22/2024 1916 by Amanda Jenkins, RN  Outcome: Progressing  8/22/2024 1032 by Karolina Mcdaniel RN  Outcome: Progressing  Flowsheets (Taken 8/22/2024 0740)  Discharge to home or other facility with appropriate resources:   Identify barriers to discharge with patient and caregiver   Arrange for needed discharge resources and transportation as appropriate     Problem: Safety - Adult  Goal: Free from fall injury  8/22/2024 1916 by Amanda Jenkins, RN  Outcome: Progressing  8/22/2024 1032 by Karolina Mcdaniel RN  Outcome: Progressing     Problem: ABCDS Injury Assessment  Goal: Absence of physical injury  8/22/2024 1032 by Karolina Mcdaniel RN  Outcome: Progressing     Problem: Skin/Tissue Integrity  Goal: Absence of new skin breakdown  Description: 1.  Monitor for areas of redness and/or skin breakdown  2.  Assess vascular access sites hourly  3.  Every 4-6 hours minimum:  Change oxygen saturation probe site  4.  Every 4-6 hours:  If on nasal continuous positive airway pressure, respiratory therapy assess nares and determine need for appliance change or resting period.  8/22/2024 1032 by Karolina Mcdaniel RN  Outcome: Progressing     
  Problem: Discharge Planning  Goal: Discharge to home or other facility with appropriate resources  8/24/2024 0257 by Malorie Conde RN  Outcome: Progressing  8/23/2024 1744 by Jacquelyn Duke RN  Outcome: Not Progressing     Problem: Safety - Adult  Goal: Free from fall injury  8/24/2024 0257 by Malorie Conde RN  Outcome: Progressing  8/23/2024 1744 by Jacquelyn Duke RN  Outcome: Not Progressing     Problem: ABCDS Injury Assessment  Goal: Absence of physical injury  8/24/2024 0257 by Malorie Conde RN  Outcome: Progressing  8/23/2024 1744 by Jacquelyn Duke RN  Outcome: Not Progressing     Problem: Skin/Tissue Integrity  Goal: Absence of new skin breakdown  Description: 1.  Monitor for areas of redness and/or skin breakdown  2.  Assess vascular access sites hourly  3.  Every 4-6 hours minimum:  Change oxygen saturation probe site  4.  Every 4-6 hours:  If on nasal continuous positive airway pressure, respiratory therapy assess nares and determine need for appliance change or resting period.  8/24/2024 0257 by Malorie Conde RN  Outcome: Progressing  8/23/2024 1744 by Jacquelyn Duke RN  Outcome: Not Progressing     Problem: Pain  Goal: Verbalizes/displays adequate comfort level or baseline comfort level  8/24/2024 0257 by Malorie Conde RN  Outcome: Progressing  8/23/2024 1744 by Jacquelyn Duke RN  Outcome: Not Progressing     Problem: Neurosensory - Adult  Goal: Achieves stable or improved neurological status  Outcome: Progressing  Goal: Achieves maximal functionality and self care  Outcome: Progressing     Problem: Respiratory - Adult  Goal: Achieves optimal ventilation and oxygenation  Outcome: Progressing     Problem: Cardiovascular - Adult  Goal: Maintains optimal cardiac output and hemodynamic stability  Outcome: Progressing  Goal: Absence of cardiac dysrhythmias or at baseline  Outcome: Progressing     Problem: Skin/Tissue Integrity - Adult  Goal: Skin integrity remains intact  Outcome: Progressing  Goal: 
  Problem: Discharge Planning  Goal: Discharge to home or other facility with appropriate resources  8/26/2024 2202 by Ling Almaguer GN  Outcome: Progressing  Flowsheets (Taken 8/26/2024 1941)  Discharge to home or other facility with appropriate resources:   Identify barriers to discharge with patient and caregiver   Identify discharge learning needs (meds, wound care, etc)   Arrange for needed discharge resources and transportation as appropriate   Refer to discharge planning if patient needs post-hospital services based on physician order or complex needs related to functional status, cognitive ability or social support system  8/26/2024 1408 by Rubi Quispe RN  Outcome: Progressing     Problem: Safety - Adult  Goal: Free from fall injury  8/26/2024 2202 by Ling Almaguer GN  Outcome: Progressing  Flowsheets (Taken 8/26/2024 1941)  Free From Fall Injury: Instruct family/caregiver on patient safety  8/26/2024 1408 by Rubi Quispe RN  Outcome: Progressing     Problem: ABCDS Injury Assessment  Goal: Absence of physical injury  8/26/2024 2202 by Ling Almaguer GN  Outcome: Progressing  Flowsheets (Taken 8/26/2024 1941)  Absence of Physical Injury: Implement safety measures based on patient assessment  8/26/2024 1408 by Rubi Quispe RN  Outcome: Progressing     Problem: Skin/Tissue Integrity  Goal: Absence of new skin breakdown  Description: 1.  Monitor for areas of redness and/or skin breakdown  2.  Assess vascular access sites hourly  3.  Every 4-6 hours minimum:  Change oxygen saturation probe site  4.  Every 4-6 hours:  If on nasal continuous positive airway pressure, respiratory therapy assess nares and determine need for appliance change or resting period.  8/26/2024 2202 by Ling Almaguer GN  Outcome: Progressing  8/26/2024 1408 by Rubi Quispe RN  Outcome: Progressing     Problem: Pain  Goal: Verbalizes/displays adequate comfort level or baseline comfort level  8/26/2024 2202 by Ling Almaguer 
  Problem: Discharge Planning  Goal: Discharge to home or other facility with appropriate resources  8/30/2024 2349 by Gila Bernard RN  Outcome: Progressing  8/30/2024 1125 by Karolina Mcdaniel RN  Outcome: Progressing  Flowsheets (Taken 8/30/2024 0807)  Discharge to home or other facility with appropriate resources:   Identify barriers to discharge with patient and caregiver   Identify discharge learning needs (meds, wound care, etc)     Problem: Safety - Adult  Goal: Free from fall injury  8/30/2024 2349 by Gila Bernard RN  Outcome: Progressing  8/30/2024 1125 by Karolina Mcdaniel RN  Outcome: Progressing     Problem: ABCDS Injury Assessment  Goal: Absence of physical injury  8/30/2024 2349 by Gila Bernard RN  Outcome: Progressing  8/30/2024 1125 by Karolina Mcdaniel RN  Outcome: Progressing     Problem: Skin/Tissue Integrity  Goal: Absence of new skin breakdown  Description: 1.  Monitor for areas of redness and/or skin breakdown  2.  Assess vascular access sites hourly  3.  Every 4-6 hours minimum:  Change oxygen saturation probe site  4.  Every 4-6 hours:  If on nasal continuous positive airway pressure, respiratory therapy assess nares and determine need for appliance change or resting period.  8/30/2024 2349 by Gila Bernard RN  Outcome: Progressing  8/30/2024 1125 by Karolina Mcdaniel RN  Outcome: Progressing     Problem: Pain  Goal: Verbalizes/displays adequate comfort level or baseline comfort level  8/30/2024 2349 by Gila Bernard RN  Outcome: Progressing  8/30/2024 1125 by Karolina Mcdaniel RN  Outcome: Progressing     Problem: Neurosensory - Adult  Goal: Achieves stable or improved neurological status  8/30/2024 2349 by Gila Bernard RN  Outcome: Progressing  Flowsheets (Taken 8/30/2024 2000)  Achieves stable or improved neurological status:   Assess for and report changes in neurological status   Initiate measures to prevent increased intracranial pressure  8/30/2024 1125 
  Problem: Discharge Planning  Goal: Discharge to home or other facility with appropriate resources  Outcome: Not Progressing     Problem: Safety - Adult  Goal: Free from fall injury  Outcome: Not Progressing     Problem: ABCDS Injury Assessment  Goal: Absence of physical injury  Outcome: Not Progressing     Problem: Skin/Tissue Integrity  Goal: Absence of new skin breakdown  Description: 1.  Monitor for areas of redness and/or skin breakdown  2.  Assess vascular access sites hourly  3.  Every 4-6 hours minimum:  Change oxygen saturation probe site  4.  Every 4-6 hours:  If on nasal continuous positive airway pressure, respiratory therapy assess nares and determine need for appliance change or resting period.  Outcome: Not Progressing     Problem: Pain  Goal: Verbalizes/displays adequate comfort level or baseline comfort level  Outcome: Not Progressing     
  Problem: Discharge Planning  Goal: Discharge to home or other facility with appropriate resources  Outcome: Progressing  Flowsheets (Taken 8/25/2024 1945)  Discharge to home or other facility with appropriate resources:   Identify barriers to discharge with patient and caregiver   Identify discharge learning needs (meds, wound care, etc)   Arrange for needed discharge resources and transportation as appropriate   Arrange for interpreters to assist at discharge as needed   Refer to discharge planning if patient needs post-hospital services based on physician order or complex needs related to functional status, cognitive ability or social support system     Problem: Safety - Adult  Goal: Free from fall injury  Outcome: Progressing  Flowsheets (Taken 8/25/2024 1945)  Free From Fall Injury: Instruct family/caregiver on patient safety     Problem: ABCDS Injury Assessment  Goal: Absence of physical injury  Outcome: Progressing     Problem: Skin/Tissue Integrity  Goal: Absence of new skin breakdown  Description: 1.  Monitor for areas of redness and/or skin breakdown  2.  Assess vascular access sites hourly  3.  Every 4-6 hours minimum:  Change oxygen saturation probe site  4.  Every 4-6 hours:  If on nasal continuous positive airway pressure, respiratory therapy assess nares and determine need for appliance change or resting period.  Outcome: Progressing     Problem: Pain  Goal: Verbalizes/displays adequate comfort level or baseline comfort level  Outcome: Progressing  Flowsheets (Taken 8/25/2024 2040)  Verbalizes/displays adequate comfort level or baseline comfort level:   Encourage patient to monitor pain and request assistance   Assess pain using appropriate pain scale   Administer analgesics based on type and severity of pain and evaluate response   Implement non-pharmacological measures as appropriate and evaluate response   Consider cultural and social influences on pain and pain management   Notify Licensed 
RN  Outcome: Progressing     Problem: Respiratory - Adult  Goal: Achieves optimal ventilation and oxygenation  8/31/2024 2233 by Gila Bernard RN  Outcome: Progressing  8/31/2024 1034 by Luli Arredondo RN  Outcome: Progressing     Problem: Cardiovascular - Adult  Goal: Maintains optimal cardiac output and hemodynamic stability  8/31/2024 2233 by Gila Bernard RN  Outcome: Progressing  8/31/2024 1034 by Luli Arredondo RN  Outcome: Progressing  Goal: Absence of cardiac dysrhythmias or at baseline  8/31/2024 2233 by Gila Bernard RN  Outcome: Progressing  8/31/2024 1034 by Luli Arredondo RN  Outcome: Progressing     Problem: Skin/Tissue Integrity - Adult  Goal: Skin integrity remains intact  8/31/2024 2233 by Gila Bernard RN  Outcome: Progressing  8/31/2024 1034 by Luli Arredondo RN  Outcome: Progressing  Goal: Incisions, wounds, or drain sites healing without S/S of infection  8/31/2024 2233 by Gila Bernard RN  Outcome: Progressing  8/31/2024 1034 by Luli Arredondo RN  Outcome: Progressing  Goal: Oral mucous membranes remain intact  8/31/2024 2233 by Gila Bernard RN  Outcome: Progressing  8/31/2024 1034 by Luli Arredondo RN  Outcome: Progressing     Problem: Musculoskeletal - Adult  Goal: Return mobility to safest level of function  8/31/2024 2233 by Gila Bernard RN  Outcome: Progressing  8/31/2024 1034 by Luli Arredondo RN  Outcome: Progressing  Goal: Maintain proper alignment of affected body part  8/31/2024 2233 by Gila Bernard RN  Outcome: Progressing  8/31/2024 1034 by Luli Arredondo RN  Outcome: Progressing  Goal: Return ADL status to a safe level of function  8/31/2024 2233 by Gila Bernard RN  Outcome: Progressing  8/31/2024 1034 by Luli Arredondo RN  Outcome: Progressing     Problem: Gastrointestinal - Adult  Goal: Maintains or returns to baseline bowel function  8/31/2024 2233 by Gila Bernard, RN  Outcome: 
BRITTANEY Rodriguez  Outcome: Progressing  8/27/2024 1945 by Kaleigh Mackay RN  Outcome: Progressing  Goal: Urinary catheter remains patent  8/28/2024 0713 by Jennifer Barksdale RN  Outcome: Progressing  8/27/2024 1945 by Kaleigh Mackay RN  Outcome: Progressing     Problem: Metabolic/Fluid and Electrolytes - Adult  Goal: Electrolytes maintained within normal limits  8/28/2024 0713 by Jennifer Barksdale RN  Outcome: Progressing  8/27/2024 1945 by Kaleigh Mackay RN  Outcome: Progressing  Goal: Hemodynamic stability and optimal renal function maintained  8/28/2024 0713 by Jennifer Barksdale RN  Outcome: Progressing  8/27/2024 1945 by Kaleigh Mackay RN  Outcome: Progressing  Goal: Glucose maintained within prescribed range  8/28/2024 0713 by Jennifer Barksdale RN  Outcome: Progressing  8/27/2024 1945 by Kaleigh Mackay RN  Outcome: Progressing     Problem: Hematologic - Adult  Goal: Maintains hematologic stability  8/28/2024 0713 by Jennifer Barksdale RN  Outcome: Progressing  8/27/2024 1945 by Kaleigh Mackay RN  Outcome: Progressing     
and family use of appropriate assistive device and precautions (e.g. spinal or hip dislocation precautions)  8/24/2024 1054 by Luli Arredondo RN  Outcome: Progressing  Goal: Return ADL status to a safe level of function  8/24/2024 2100 by Malorie Conde RN  Outcome: Progressing  Flowsheets (Taken 8/24/2024 1951)  Return ADL Status to a Safe Level of Function:   Administer medication as ordered   Assess activities of daily living deficits and provide assistive devices as needed   Obtain physical therapy/occupational therapy consults as needed   Assist and instruct patient to increase activity and self care as tolerated  8/24/2024 1054 by Luli Arredondo RN  Outcome: Progressing     Problem: Gastrointestinal - Adult  Goal: Maintains or returns to baseline bowel function  8/24/2024 2100 by Malorie Conde RN  Outcome: Progressing  8/24/2024 1054 by Luli Arredondo RN  Outcome: Progressing     Problem: Genitourinary - Adult  Goal: Absence of urinary retention  8/24/2024 2100 by Malorie Conde RN  Outcome: Progressing  Flowsheets (Taken 8/24/2024 1951)  Absence of urinary retention:   Assess patient’s ability to void and empty bladder   Monitor intake/output and perform bladder scan as needed  8/24/2024 1054 by Luli Arredondo RN  Outcome: Progressing  Goal: Urinary catheter remains patent  8/24/2024 2100 by Malorie Conde RN  Outcome: Progressing  Flowsheets (Taken 8/24/2024 1951)  Urinary catheter remains patent: Assess patency of urinary catheter  8/24/2024 1054 by Luli Arredondo RN  Outcome: Progressing     Problem: Metabolic/Fluid and Electrolytes - Adult  Goal: Electrolytes maintained within normal limits  8/24/2024 2100 by Malorie Conde RN  Outcome: Progressing  Flowsheets (Taken 8/24/2024 1951)  Electrolytes maintained within normal limits:   Monitor labs and assess patient for signs and symptoms of electrolyte imbalances   Administer electrolyte replacement as ordered  8/24/2024 1054 
or clotting disorders   Administer blood products/factors as ordered

## 2024-09-03 ENCOUNTER — CARE COORDINATION (OUTPATIENT)
Dept: CARE COORDINATION | Facility: CLINIC | Age: 89
End: 2024-09-03

## 2024-09-03 NOTE — CARE COORDINATION
Transition of care outreach postponed due to patient's discharge to:  [facility]  Address: [facility] [address], [place] [address]; Phone: [phone]

## 2024-09-10 ENCOUNTER — CARE COORDINATION (OUTPATIENT)
Dept: CARE COORDINATION | Facility: CLINIC | Age: 89
End: 2024-09-10

## 2024-09-13 ENCOUNTER — CARE COORDINATION (OUTPATIENT)
Dept: CARE COORDINATION | Facility: CLINIC | Age: 89
End: 2024-09-13

## 2024-09-13 DIAGNOSIS — G91.2 NPH (NORMAL PRESSURE HYDROCEPHALUS) (HCC): Primary | ICD-10-CM

## 2024-09-13 PROCEDURE — 1111F DSCHRG MED/CURRENT MED MERGE: CPT | Performed by: GENERAL PRACTICE

## 2024-12-12 ENCOUNTER — CARE COORDINATION (OUTPATIENT)
Dept: CARE COORDINATION | Facility: CLINIC | Age: 89
End: 2024-12-12

## 2024-12-13 ENCOUNTER — CARE COORDINATION (OUTPATIENT)
Dept: CARE COORDINATION | Facility: CLINIC | Age: 89
End: 2024-12-13

## 2024-12-13 DIAGNOSIS — R11.10 VOMITING, UNSPECIFIED VOMITING TYPE, UNSPECIFIED WHETHER NAUSEA PRESENT: Primary | ICD-10-CM

## 2024-12-13 PROCEDURE — 1111F DSCHRG MED/CURRENT MED MERGE: CPT | Performed by: GENERAL PRACTICE

## 2024-12-13 NOTE — CARE COORDINATION
Attempted to reach patient for transitions of care follow up. Unable to reach patient.    Outreach Attempts:   HIPAA compliant voicemail left for patient.     Patient: Rogerio Owens    Patient : 1935   MRN: [id number]    Reason for Admission: Vomiting, unspecified vomiting type, unspecified whether nausea present   Discharge Date: 24  RURS: Readmission Risk Score: 24.8    Last Discharge Facility       Date Complaint Diagnosis Description Type Department Provider    24  DDD (degenerative disc disease), cervical Admission (Discharged) [id number] Clyde Leroy MD            Was this an external facility discharge? Yes. Discharge Date: 2024   . Facility Name: [facility]    Follow Up Appointment:   Patient does not have a follow up appointment scheduled at time of call.  UTR; no further outreach; patient will not attend a hospital follow up with [facility] provider.      No further follow-up call indicated     Katja Hernandez RN       PAST MEDICAL HISTORY:  Chronic GERD     Dementia     Folate deficiency     History of TIA (transient ischemic attack)     Hypertension     Seizure disorder     Vitamin B12 deficiency

## 2025-03-27 ENCOUNTER — HOSPITAL ENCOUNTER (EMERGENCY)
Age: 89
Discharge: HOME | End: 2025-03-27
Payer: OTHER GOVERNMENT

## 2025-03-27 VITALS
DIASTOLIC BLOOD PRESSURE: 80 MMHG | HEART RATE: 69 BPM | BODY MASS INDEX: 26.36 KG/M2 | WEIGHT: 164 LBS | HEIGHT: 66 IN | RESPIRATION RATE: 17 BRPM | OXYGEN SATURATION: 96 % | TEMPERATURE: 97.9 F | SYSTOLIC BLOOD PRESSURE: 124 MMHG

## 2025-03-27 DIAGNOSIS — S61.412A LACERATION OF LEFT HAND, FOREIGN BODY PRESENCE UNSPECIFIED, INITIAL ENCOUNTER: Primary | ICD-10-CM

## 2025-03-27 PROCEDURE — 6360000002 HC RX W HCPCS: Performed by: PHYSICIAN ASSISTANT

## 2025-03-27 PROCEDURE — 90714 TD VACC NO PRESV 7 YRS+ IM: CPT | Performed by: PHYSICIAN ASSISTANT

## 2025-03-27 PROCEDURE — 12002 RPR S/N/AX/GEN/TRNK2.6-7.5CM: CPT

## 2025-03-27 PROCEDURE — 90471 IMMUNIZATION ADMIN: CPT | Performed by: PHYSICIAN ASSISTANT

## 2025-03-27 PROCEDURE — 99284 EMERGENCY DEPT VISIT MOD MDM: CPT

## 2025-03-27 RX ORDER — OXYCODONE AND ACETAMINOPHEN 5; 325 MG/1; MG/1
1 TABLET ORAL EVERY 4 HOURS PRN
COMMUNITY

## 2025-03-27 RX ORDER — MORPHINE SULFATE 15 MG/1
15 TABLET ORAL EVERY 4 HOURS PRN
COMMUNITY

## 2025-03-27 RX ORDER — DEXAMETHASONE 2 MG/1
2 TABLET ORAL 2 TIMES DAILY WITH MEALS
COMMUNITY

## 2025-03-27 RX ORDER — MIDODRINE HYDROCHLORIDE 5 MG/1
5 TABLET ORAL 3 TIMES DAILY
COMMUNITY

## 2025-03-27 RX ADMIN — CLOSTRIDIUM TETANI TOXOID ANTIGEN (FORMALDEHYDE INACTIVATED) AND CORYNEBACTERIUM DIPHTHERIAE TOXOID ANTIGEN (FORMALDEHYDE INACTIVATED) 0.5 ML: 5; 2 INJECTION, SUSPENSION INTRAMUSCULAR at 15:06

## 2025-03-27 RX ADMIN — LIDOCAINE HYDROCHLORIDE 5 ML: 10 INJECTION, SOLUTION INFILTRATION; PERINEURAL at 15:07

## 2025-03-27 ASSESSMENT — PAIN SCALES - GENERAL: PAINLEVEL_OUTOF10: 0

## 2025-03-27 ASSESSMENT — LIFESTYLE VARIABLES
HOW MANY STANDARD DRINKS CONTAINING ALCOHOL DO YOU HAVE ON A TYPICAL DAY: PATIENT DOES NOT DRINK
HOW OFTEN DO YOU HAVE A DRINK CONTAINING ALCOHOL: NEVER

## 2025-03-27 ASSESSMENT — PAIN - FUNCTIONAL ASSESSMENT: PAIN_FUNCTIONAL_ASSESSMENT: 0-10

## 2025-03-27 NOTE — ED TRIAGE NOTES
Pt in via EMS from home with c/o mechanical fall from a chair with laceration to left hand between thumb and index finger/webbing. Fall witnessed by wife/caretaker, with no LOC and no blood thinners.

## 2025-03-27 NOTE — ED NOTES
Patient mobility status  wheelchair bound and home bound.     I have reviewed discharge instructions with the patient.  The patient verbalized understanding.    Patient left ED via Discharge Method: stretcher to Home with  [facility] .    Opportunity for questions and clarification provided.     Patient given 0 scripts.

## 2025-03-27 NOTE — ED PROVIDER NOTES
Emergency Department Provider Note       PCP: Payam Gonzalez DO   Age: 89 y.o.   Sex: male     DISPOSITION Decision To Discharge 03/27/2025 04:06:21 PM   DISPOSITION CONDITION Stable            ICD-10-CM    1. Laceration of left hand, foreign body presence unspecified, initial encounter  S61.412A           Medical Decision Making     89-year-old male presents to the ER with complaint of fall and laceration to his left hand.  Patient was sitting in a wheelchair when he fell and cut his hand on a bolt.  Wife states she saw it happen.  Patient did not hit his head, there was no LOC or vomiting.  Patient is not on blood thinners.  Laceration was cleansed and repaired.  Dressing was applied.  Discussed wound care, watching for signs of infection, suture removal, follow-up.  Patient and wife verbalized understanding and agree with plan.     1 acute complicated illness or injury.  Shared medical decision making was utilized in creating the patients health plan today.  I independently ordered and reviewed each unique test.    I reviewed external records: ED visit note from a different ED.                      History     89-year-old male presents to the ER via EMS with complaint of fall and laceration to his left hand.  Patient was sitting in a wheelchair when he started to fall and cut his hand on a bolt on the wheelchair.  The laceration is between an index finger on the webspace. Fall was witnessed by his wife/caretaker.  Patient denies LOC, head injury, blood thinners.  Patient is unsure of last tetanus immunization.  Patient denies any other injuries, pain.    The history is provided by the patient and the EMS personnel.     Physical Exam     Vitals signs and nursing note reviewed:  Vitals:    03/27/25 1441 03/27/25 1615   BP: 136/81 124/80   Pulse: 66 69   Resp: 17    Temp: 97.9 °F (36.6 °C)    TempSrc: Oral    SpO2: 94% 96%   Weight: 74.4 kg (164 lb)    Height: 1.676 m (5' 6\")       Physical Exam  Vitals and

## 2025-04-10 ENCOUNTER — HOSPITAL ENCOUNTER (EMERGENCY)
Age: 89
Discharge: HOME | End: 2025-04-10
Attending: STUDENT IN AN ORGANIZED HEALTH CARE EDUCATION/TRAINING PROGRAM
Payer: OTHER GOVERNMENT

## 2025-04-10 ENCOUNTER — APPOINTMENT (OUTPATIENT)
Dept: GENERAL RADIOLOGY | Age: 89
End: 2025-04-10
Payer: OTHER GOVERNMENT

## 2025-04-10 VITALS
DIASTOLIC BLOOD PRESSURE: 86 MMHG | WEIGHT: 146.7 LBS | HEIGHT: 66 IN | OXYGEN SATURATION: 93 % | BODY MASS INDEX: 23.58 KG/M2 | HEART RATE: 79 BPM | SYSTOLIC BLOOD PRESSURE: 127 MMHG | TEMPERATURE: 97.4 F | RESPIRATION RATE: 16 BRPM

## 2025-04-10 DIAGNOSIS — M25.521 RIGHT ELBOW PAIN: Primary | ICD-10-CM
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
## 2025-04-10 PROCEDURE — 71045 X-RAY EXAM CHEST 1 VIEW: CPT

## 2025-04-10 PROCEDURE — 99283 EMERGENCY DEPT VISIT LOW MDM: CPT

## 2025-04-10 PROCEDURE — 73070 X-RAY EXAM OF ELBOW: CPT

## 2025-04-10 PROCEDURE — 73030 X-RAY EXAM OF SHOULDER: CPT

## 2025-04-10 PROCEDURE — 72170 X-RAY EXAM OF PELVIS: CPT

## 2025-04-10 ASSESSMENT — ENCOUNTER SYMPTOMS
SORE THROAT: 0
SHORTNESS OF BREATH: 0
NAUSEA: 0
COUGH: 0
ABDOMINAL PAIN: 0
VOMITING: 0
EYE REDNESS: 0
EYE PAIN: 0

## 2025-04-10 NOTE — ED TRIAGE NOTES
Pt presents with EMS. Pt c/o fall from bed, approx 2 ft to from bed to floor. -LOC.    Right arm became caught in bed in the fall. Pt c/o of right elbow and right shoulder pain. Pt states right shoulder pain existed prior to fall.     A&O x3   Hx Alzheimers

## 2025-04-10 NOTE — ED PROVIDER NOTES
independently ordered and reviewed each unique test.    I reviewed external records: ED visit note from a different ED.   I reviewed external records: provider visit note from PCP.       I interpreted the X-rays as above.              History     Mr. Owens is an 89-year-old male with PMH of HTN, Alzheimer's, lung cancer who presents to the ED for fall.  Per EMS report, patient was in his hospital bed at home and had a 2 foot fall down to the ground from there.  Complaining of right elbow pain where there is a small skin tear present.  Was also complaining of right shoulder pain as well.  No reported head trauma or LOC.  Patient is alert and oriented to person place but not time.  Wife is currently in transit to provide additional history but not at bedside.  Has no other complaints at this time, moves all extremities without difficulty.  Patient denies headache, neck pain, paresthesias, numbness/tingling, abdominal pain, nausea/vomiting, blurry vision or dizziness.          ROS     Review of Systems   Constitutional:  Negative for chills and fever.   HENT:  Negative for sore throat.    Eyes:  Negative for pain and redness.   Respiratory:  Negative for cough and shortness of breath.    Cardiovascular:  Negative for chest pain.   Gastrointestinal:  Negative for abdominal pain, nausea and vomiting.   Genitourinary:  Negative for dysuria and urgency.   Musculoskeletal:  Positive for arthralgias.   Skin:  Negative for rash.   Neurological:  Negative for dizziness and headaches.        Physical Exam     Vitals signs and nursing note reviewed:  Vitals:    04/10/25 1012 04/10/25 1016 04/10/25 1024 04/10/25 1030   BP: 118/72   129/72   Pulse: 68      Resp: 16      Temp: 97.4 °F (36.3 °C)      TempSrc: Oral      SpO2: 91%  95%    Weight:  66.5 kg (146 lb 11.2 oz)     Height: 1.676 m (5' 6\")         Physical Exam  Constitutional:       General: He is not in acute distress.     Appearance: Normal appearance. He is not    Findings/impression: No acute finding. Mild degenerative changes of the hips.         Electronically signed by Devin Nuñez      XR SHOULDER RIGHT (MIN 2 VIEWS)   Final Result   Findings/impression: There is elevation of the humeral head relative to the   glenoid suggesting underlying rotator cuff insufficiency. Moderate to advanced   degenerative changes of the acromioclavicular joint. Mild degenerative changes   of the glenohumeral joint. No acute findings seen.         Electronically signed by Devin Nuñez      XR ELBOW RIGHT (2 VIEWS)   Final Result   Findings/impression: Moderate to advanced degenerative changes of the right   elbow. Well-defined olecranon enthesophyte. No acute findings seen.      Electronically signed by Devin Nuñez                   No results for input(s): \"COVID19\" in the last 72 hours.     Voice dictation software was used during the making of this note.  This software is not perfect and grammatical and other typographical errors may be present.  This note has not been completely proofread for errors.     Grant Mckee MD  04/10/25 5899